# Patient Record
Sex: MALE | Race: OTHER | HISPANIC OR LATINO
[De-identification: names, ages, dates, MRNs, and addresses within clinical notes are randomized per-mention and may not be internally consistent; named-entity substitution may affect disease eponyms.]

---

## 2016-06-01 RX ORDER — ASPIRIN/CALCIUM CARB/MAGNESIUM 324 MG
1 TABLET ORAL
Qty: 0 | Refills: 0 | DISCHARGE
Start: 2016-06-01

## 2016-06-08 RX ORDER — INSULIN LISPRO 100/ML
10 VIAL (ML) SUBCUTANEOUS
Qty: 0 | Refills: 0 | DISCHARGE
Start: 2016-06-08

## 2016-06-08 RX ORDER — INSULIN LISPRO 100/ML
6 VIAL (ML) SUBCUTANEOUS
Qty: 30 | Refills: 0 | DISCHARGE
Start: 2016-06-08

## 2017-01-18 ENCOUNTER — APPOINTMENT (OUTPATIENT)
Dept: UROLOGY | Facility: CLINIC | Age: 67
End: 2017-01-18

## 2017-01-18 VITALS — OXYGEN SATURATION: 96 % | SYSTOLIC BLOOD PRESSURE: 141 MMHG | HEART RATE: 80 BPM | DIASTOLIC BLOOD PRESSURE: 80 MMHG

## 2017-01-30 ENCOUNTER — APPOINTMENT (OUTPATIENT)
Dept: ORTHOPEDIC SURGERY | Facility: CLINIC | Age: 67
End: 2017-01-30

## 2017-02-06 ENCOUNTER — APPOINTMENT (OUTPATIENT)
Dept: UROLOGY | Facility: IMAGING CENTER | Age: 67
End: 2017-02-06

## 2017-02-06 ENCOUNTER — RESULT CHARGE (OUTPATIENT)
Age: 67
End: 2017-02-06

## 2017-02-06 ENCOUNTER — OUTPATIENT (OUTPATIENT)
Dept: OUTPATIENT SERVICES | Facility: HOSPITAL | Age: 67
LOS: 1 days | End: 2017-02-06
Payer: MEDICAID

## 2017-02-06 VITALS
TEMPERATURE: 98.7 F | SYSTOLIC BLOOD PRESSURE: 152 MMHG | HEART RATE: 83 BPM | DIASTOLIC BLOOD PRESSURE: 99 MMHG | RESPIRATION RATE: 18 BRPM

## 2017-02-06 DIAGNOSIS — Z95.1 PRESENCE OF AORTOCORONARY BYPASS GRAFT: Chronic | ICD-10-CM

## 2017-02-06 DIAGNOSIS — R35.0 FREQUENCY OF MICTURITION: ICD-10-CM

## 2017-02-06 LAB
BILIRUB UR QL STRIP: NEGATIVE
CLARITY UR: CLEAR
COLLECTION METHOD: NORMAL
GLUCOSE UR-MCNC: NORMAL
HCG UR QL: 0.2 EU/DL
HGB UR QL STRIP.AUTO: NEGATIVE
KETONES UR-MCNC: NEGATIVE
LEUKOCYTE ESTERASE UR QL STRIP: NEGATIVE
NITRITE UR QL STRIP: NEGATIVE
PH UR STRIP: 6
PROT UR STRIP-MCNC: NEGATIVE
SP GR UR STRIP: 1.01

## 2017-02-06 PROCEDURE — 51784 ANAL/URINARY MUSCLE STUDY: CPT

## 2017-02-06 PROCEDURE — 51728 CYSTOMETROGRAM W/VP: CPT

## 2017-02-06 PROCEDURE — 51797 INTRAABDOMINAL PRESSURE TEST: CPT

## 2017-02-06 PROCEDURE — 51741 ELECTRO-UROFLOWMETRY FIRST: CPT

## 2017-02-08 ENCOUNTER — APPOINTMENT (OUTPATIENT)
Dept: UROLOGY | Facility: CLINIC | Age: 67
End: 2017-02-08

## 2017-02-08 VITALS — DIASTOLIC BLOOD PRESSURE: 80 MMHG | OXYGEN SATURATION: 96 % | SYSTOLIC BLOOD PRESSURE: 115 MMHG | HEART RATE: 74 BPM

## 2017-02-13 ENCOUNTER — APPOINTMENT (OUTPATIENT)
Dept: ORTHOPEDIC SURGERY | Facility: CLINIC | Age: 67
End: 2017-02-13

## 2017-02-13 RX ORDER — COLD-HOT PACK
125 MCG EACH MISCELLANEOUS
Qty: 30 | Refills: 0 | Status: ACTIVE | COMMUNITY
Start: 2016-12-01

## 2017-02-13 RX ORDER — SENNOSIDES 8.6 MG/1
8.6 TABLET ORAL
Qty: 30 | Refills: 0 | Status: ACTIVE | COMMUNITY
Start: 2016-12-01

## 2017-02-13 RX ORDER — DOCUSATE SODIUM 100 MG/1
100 CAPSULE ORAL
Qty: 90 | Refills: 0 | Status: ACTIVE | COMMUNITY
Start: 2016-12-01

## 2017-02-15 ENCOUNTER — FORM ENCOUNTER (OUTPATIENT)
Age: 67
End: 2017-02-15

## 2017-02-16 ENCOUNTER — APPOINTMENT (OUTPATIENT)
Dept: UROLOGY | Facility: CLINIC | Age: 67
End: 2017-02-16

## 2017-02-16 ENCOUNTER — OUTPATIENT (OUTPATIENT)
Dept: OUTPATIENT SERVICES | Facility: HOSPITAL | Age: 67
LOS: 1 days | End: 2017-02-16
Payer: MEDICARE

## 2017-02-16 ENCOUNTER — APPOINTMENT (OUTPATIENT)
Dept: ULTRASOUND IMAGING | Facility: IMAGING CENTER | Age: 67
End: 2017-02-16

## 2017-02-16 VITALS
SYSTOLIC BLOOD PRESSURE: 123 MMHG | BODY MASS INDEX: 23.9 KG/M2 | DIASTOLIC BLOOD PRESSURE: 75 MMHG | WEIGHT: 140 LBS | RESPIRATION RATE: 17 BRPM | HEART RATE: 76 BPM | HEIGHT: 64 IN | TEMPERATURE: 98.7 F

## 2017-02-16 DIAGNOSIS — Z95.1 PRESENCE OF AORTOCORONARY BYPASS GRAFT: Chronic | ICD-10-CM

## 2017-02-16 DIAGNOSIS — R33.9 RETENTION OF URINE, UNSPECIFIED: ICD-10-CM

## 2017-02-16 PROCEDURE — 76770 US EXAM ABDO BACK WALL COMP: CPT

## 2017-02-17 ENCOUNTER — APPOINTMENT (OUTPATIENT)
Dept: UROLOGY | Facility: CLINIC | Age: 67
End: 2017-02-17

## 2017-02-17 VITALS — HEART RATE: 100 BPM | DIASTOLIC BLOOD PRESSURE: 74 MMHG | SYSTOLIC BLOOD PRESSURE: 120 MMHG | OXYGEN SATURATION: 95 %

## 2017-02-18 DIAGNOSIS — R32 UNSPECIFIED URINARY INCONTINENCE: ICD-10-CM

## 2017-03-06 ENCOUNTER — MESSAGE (OUTPATIENT)
Age: 67
End: 2017-03-06

## 2017-03-06 ENCOUNTER — OUTPATIENT (OUTPATIENT)
Dept: OUTPATIENT SERVICES | Facility: HOSPITAL | Age: 67
LOS: 1 days | End: 2017-03-06
Payer: MEDICARE

## 2017-03-06 ENCOUNTER — APPOINTMENT (OUTPATIENT)
Dept: UROLOGY | Facility: CLINIC | Age: 67
End: 2017-03-06

## 2017-03-06 VITALS
SYSTOLIC BLOOD PRESSURE: 149 MMHG | TEMPERATURE: 98.2 F | DIASTOLIC BLOOD PRESSURE: 81 MMHG | HEART RATE: 80 BPM | RESPIRATION RATE: 17 BRPM

## 2017-03-06 DIAGNOSIS — Z95.1 PRESENCE OF AORTOCORONARY BYPASS GRAFT: Chronic | ICD-10-CM

## 2017-03-06 PROCEDURE — 51700 IRRIGATION OF BLADDER: CPT

## 2017-03-07 ENCOUNTER — APPOINTMENT (OUTPATIENT)
Dept: UROLOGY | Facility: CLINIC | Age: 67
End: 2017-03-07

## 2017-03-09 ENCOUNTER — MESSAGE (OUTPATIENT)
Age: 67
End: 2017-03-09

## 2017-03-09 LAB — BACTERIA UR CULT: ABNORMAL

## 2017-03-16 DIAGNOSIS — N13.9 OBSTRUCTIVE AND REFLUX UROPATHY, UNSPECIFIED: ICD-10-CM

## 2017-03-16 DIAGNOSIS — N39.44 NOCTURNAL ENURESIS: ICD-10-CM

## 2017-03-16 DIAGNOSIS — R33.9 RETENTION OF URINE, UNSPECIFIED: ICD-10-CM

## 2017-03-20 ENCOUNTER — APPOINTMENT (OUTPATIENT)
Dept: UROLOGY | Facility: CLINIC | Age: 67
End: 2017-03-20

## 2017-03-20 RX ORDER — CIPROFLOXACIN HYDROCHLORIDE 500 MG/1
500 TABLET, FILM COATED ORAL TWICE DAILY
Qty: 6 | Refills: 0 | Status: COMPLETED | COMMUNITY
Start: 2017-03-06 | End: 2017-03-20

## 2017-03-24 LAB
ANION GAP SERPL CALC-SCNC: 14 MMOL/L
BUN SERPL-MCNC: 19 MG/DL
CALCIUM SERPL-MCNC: 10.2 MG/DL
CHLORIDE SERPL-SCNC: 105 MMOL/L
CO2 SERPL-SCNC: 21 MMOL/L
CREAT SERPL-MCNC: 1.35 MG/DL
GLUCOSE SERPL-MCNC: 98 MG/DL
POTASSIUM SERPL-SCNC: 4.3 MMOL/L
SODIUM SERPL-SCNC: 140 MMOL/L

## 2017-03-29 ENCOUNTER — APPOINTMENT (OUTPATIENT)
Dept: UROLOGY | Facility: CLINIC | Age: 67
End: 2017-03-29

## 2017-03-29 ENCOUNTER — OUTPATIENT (OUTPATIENT)
Dept: OUTPATIENT SERVICES | Facility: HOSPITAL | Age: 67
LOS: 1 days | End: 2017-03-29
Payer: MEDICARE

## 2017-03-29 DIAGNOSIS — Z95.1 PRESENCE OF AORTOCORONARY BYPASS GRAFT: Chronic | ICD-10-CM

## 2017-03-29 PROCEDURE — 51701 INSERT BLADDER CATHETER: CPT

## 2017-03-31 ENCOUNTER — APPOINTMENT (OUTPATIENT)
Dept: UROLOGY | Facility: CLINIC | Age: 67
End: 2017-03-31

## 2017-03-31 VITALS — SYSTOLIC BLOOD PRESSURE: 90 MMHG | HEART RATE: 93 BPM | DIASTOLIC BLOOD PRESSURE: 61 MMHG | TEMPERATURE: 98.1 F

## 2017-03-31 LAB
ANION GAP SERPL CALC-SCNC: 20 MMOL/L
APPEARANCE: CLEAR
BACTERIA: ABNORMAL
BILIRUBIN URINE: NEGATIVE
BLOOD URINE: NEGATIVE
BUN SERPL-MCNC: 21 MG/DL
CALCIUM SERPL-MCNC: 9.4 MG/DL
CHLORIDE SERPL-SCNC: 106 MMOL/L
CO2 SERPL-SCNC: 18 MMOL/L
COLOR: YELLOW
CREAT SERPL-MCNC: 1.42 MG/DL
GLUCOSE QUALITATIVE U: NORMAL MG/DL
GLUCOSE SERPL-MCNC: 79 MG/DL
KETONES URINE: NEGATIVE
LEUKOCYTE ESTERASE URINE: NEGATIVE
MICROSCOPIC-UA: NORMAL
NITRITE URINE: NEGATIVE
PH URINE: 6
POTASSIUM SERPL-SCNC: 4.2 MMOL/L
PROTEIN URINE: NEGATIVE MG/DL
RED BLOOD CELLS URINE: 2 /HPF
SODIUM SERPL-SCNC: 144 MMOL/L
SPECIFIC GRAVITY URINE: 1.01
SQUAMOUS EPITHELIAL CELLS: 0 /HPF
UROBILINOGEN URINE: NORMAL MG/DL
WHITE BLOOD CELLS URINE: 2 /HPF

## 2017-04-03 ENCOUNTER — MESSAGE (OUTPATIENT)
Age: 67
End: 2017-04-03

## 2017-04-03 LAB
BACTERIA UR CULT: ABNORMAL
CORE LAB FLUID CYTOLOGY: NORMAL
PSA SERPL-MCNC: 6.15 NG/ML

## 2017-04-06 DIAGNOSIS — N39.44 NOCTURNAL ENURESIS: ICD-10-CM

## 2017-04-06 DIAGNOSIS — R35.0 FREQUENCY OF MICTURITION: ICD-10-CM

## 2017-04-06 DIAGNOSIS — R33.9 RETENTION OF URINE, UNSPECIFIED: ICD-10-CM

## 2017-05-10 ENCOUNTER — APPOINTMENT (OUTPATIENT)
Dept: UROLOGY | Facility: CLINIC | Age: 67
End: 2017-05-10

## 2017-05-10 ENCOUNTER — OUTPATIENT (OUTPATIENT)
Dept: OUTPATIENT SERVICES | Facility: HOSPITAL | Age: 67
LOS: 1 days | End: 2017-05-10
Payer: MEDICARE

## 2017-05-10 VITALS — TEMPERATURE: 98.5 F | SYSTOLIC BLOOD PRESSURE: 113 MMHG | DIASTOLIC BLOOD PRESSURE: 72 MMHG

## 2017-05-10 DIAGNOSIS — Z95.1 PRESENCE OF AORTOCORONARY BYPASS GRAFT: Chronic | ICD-10-CM

## 2017-05-10 PROCEDURE — 51702 INSERT TEMP BLADDER CATH: CPT

## 2017-05-10 RX ORDER — VITAMIN K2 90 MCG
125 MCG CAPSULE ORAL
Qty: 30 | Refills: 0 | Status: ACTIVE | COMMUNITY
Start: 2017-02-02

## 2017-05-10 RX ORDER — AMOXICILLIN AND CLAVULANATE POTASSIUM 875; 125 MG/1; MG/1
875-125 TABLET, COATED ORAL
Qty: 14 | Refills: 0 | Status: COMPLETED | COMMUNITY
Start: 2017-04-03 | End: 2017-05-10

## 2017-05-12 LAB — BACTERIA UR CULT: ABNORMAL

## 2017-05-15 DIAGNOSIS — R97.20 ELEVATED PROSTATE SPECIFIC ANTIGEN [PSA]: ICD-10-CM

## 2017-05-15 DIAGNOSIS — R33.9 RETENTION OF URINE, UNSPECIFIED: ICD-10-CM

## 2017-05-15 DIAGNOSIS — N40.1 BENIGN PROSTATIC HYPERPLASIA WITH LOWER URINARY TRACT SYMPTOMS: ICD-10-CM

## 2017-06-07 ENCOUNTER — APPOINTMENT (OUTPATIENT)
Dept: UROLOGY | Facility: CLINIC | Age: 67
End: 2017-06-07

## 2017-06-07 ENCOUNTER — OUTPATIENT (OUTPATIENT)
Dept: OUTPATIENT SERVICES | Facility: HOSPITAL | Age: 67
LOS: 1 days | End: 2017-06-07
Payer: MEDICARE

## 2017-06-07 VITALS — HEART RATE: 108 BPM | DIASTOLIC BLOOD PRESSURE: 61 MMHG | SYSTOLIC BLOOD PRESSURE: 95 MMHG

## 2017-06-07 DIAGNOSIS — R35.0 FREQUENCY OF MICTURITION: ICD-10-CM

## 2017-06-07 DIAGNOSIS — Z95.1 PRESENCE OF AORTOCORONARY BYPASS GRAFT: Chronic | ICD-10-CM

## 2017-06-07 PROCEDURE — 51700 IRRIGATION OF BLADDER: CPT

## 2017-06-08 ENCOUNTER — APPOINTMENT (OUTPATIENT)
Dept: UROLOGY | Facility: CLINIC | Age: 67
End: 2017-06-08

## 2017-06-09 DIAGNOSIS — N31.9 NEUROMUSCULAR DYSFUNCTION OF BLADDER, UNSPECIFIED: ICD-10-CM

## 2017-06-09 DIAGNOSIS — N40.1 BENIGN PROSTATIC HYPERPLASIA WITH LOWER URINARY TRACT SYMPTOMS: ICD-10-CM

## 2017-06-09 DIAGNOSIS — R33.9 RETENTION OF URINE, UNSPECIFIED: ICD-10-CM

## 2017-06-15 ENCOUNTER — OUTPATIENT (OUTPATIENT)
Dept: OUTPATIENT SERVICES | Facility: HOSPITAL | Age: 67
LOS: 1 days | End: 2017-06-15
Payer: MEDICARE

## 2017-06-15 VITALS
RESPIRATION RATE: 18 BRPM | TEMPERATURE: 99 F | DIASTOLIC BLOOD PRESSURE: 94 MMHG | HEART RATE: 99 BPM | OXYGEN SATURATION: 99 % | HEIGHT: 64 IN | SYSTOLIC BLOOD PRESSURE: 142 MMHG | WEIGHT: 147.05 LBS

## 2017-06-15 DIAGNOSIS — E11.9 TYPE 2 DIABETES MELLITUS WITHOUT COMPLICATIONS: ICD-10-CM

## 2017-06-15 DIAGNOSIS — R33.9 RETENTION OF URINE, UNSPECIFIED: ICD-10-CM

## 2017-06-15 DIAGNOSIS — I25.10 ATHEROSCLEROTIC HEART DISEASE OF NATIVE CORONARY ARTERY WITHOUT ANGINA PECTORIS: ICD-10-CM

## 2017-06-15 DIAGNOSIS — Z01.818 ENCOUNTER FOR OTHER PREPROCEDURAL EXAMINATION: ICD-10-CM

## 2017-06-15 DIAGNOSIS — Z95.1 PRESENCE OF AORTOCORONARY BYPASS GRAFT: Chronic | ICD-10-CM

## 2017-06-15 DIAGNOSIS — Z98.890 OTHER SPECIFIED POSTPROCEDURAL STATES: Chronic | ICD-10-CM

## 2017-06-15 DIAGNOSIS — N40.0 BENIGN PROSTATIC HYPERPLASIA WITHOUT LOWER URINARY TRACT SYMPTOMS: ICD-10-CM

## 2017-06-15 LAB
BLD GP AB SCN SERPL QL: NEGATIVE — SIGNIFICANT CHANGE UP
RH IG SCN BLD-IMP: POSITIVE — SIGNIFICANT CHANGE UP

## 2017-06-15 PROCEDURE — G0463: CPT

## 2017-06-15 PROCEDURE — 86901 BLOOD TYPING SEROLOGIC RH(D): CPT

## 2017-06-15 PROCEDURE — 86900 BLOOD TYPING SEROLOGIC ABO: CPT

## 2017-06-15 PROCEDURE — 86850 RBC ANTIBODY SCREEN: CPT

## 2017-06-15 PROCEDURE — 87086 URINE CULTURE/COLONY COUNT: CPT

## 2017-06-15 PROCEDURE — 87186 SC STD MICRODIL/AGAR DIL: CPT

## 2017-06-15 RX ORDER — CEFAZOLIN SODIUM 1 G
2000 VIAL (EA) INJECTION ONCE
Qty: 0 | Refills: 0 | Status: DISCONTINUED | OUTPATIENT
Start: 2017-06-28 | End: 2017-07-13

## 2017-06-15 NOTE — H&P PST ADULT - PMH
Benign prostatic hypertrophy    Coronary artery disease involving native coronary artery of native heart without angina pectoris    Dyslipidemia    Hypertension    Transient cerebral ischemia, unspecified type  10/2015  Type 2 diabetes mellitus Benign prostatic hypertrophy    Coronary artery disease involving native coronary artery of native heart without angina pectoris    Dyslipidemia    Hypertension    LBBB (left bundle branch block)    Lumbar disc disease    Transient cerebral ischemia, unspecified type  10/2015  Type 2 diabetes mellitus

## 2017-06-15 NOTE — H&P PST ADULT - NSANTHOSAYNRD_GEN_A_CORE
No. RAJENDRA screening performed.  STOP BANG Legend: 0-2 = LOW Risk; 3-4 = INTERMEDIATE Risk; 5-8 = HIGH Risk

## 2017-06-15 NOTE — H&P PST ADULT - HISTORY OF PRESENT ILLNESS
65 y/o M PMH old LBBB, CAD, S/P CABG 2015, lumbar spinal stenosis, S/P lumbar laminectomy 5/2016,  incomplete bladder emptying, enuresis, valdes catheter placed 2 months ago, citrobacter freundii UTI, treated with antibiotics last week as per patient.  Presents today for button TURP. 67 y/o Telugu speaking M PMH old LBBB, CAD, S/P CABG 2015, chronic stable CHF, lumbar spinal stenosis, S/P lumbar laminectomy 5/2016,  incomplete bladder emptying, enuresis, valdes catheter placed 2 months ago, citrobacter freundii UTI, treated with antibiotics last week as per patient.  Presents today for button TURP.

## 2017-06-16 ENCOUNTER — MESSAGE (OUTPATIENT)
Age: 67
End: 2017-06-16

## 2017-06-16 DIAGNOSIS — Z87.440 PERSONAL HISTORY OF URINARY (TRACT) INFECTIONS: ICD-10-CM

## 2017-06-16 LAB — BACTERIA UR CULT: ABNORMAL

## 2017-06-17 LAB
-  AMIKACIN: SIGNIFICANT CHANGE UP
-  AMPICILLIN/SULBACTAM: SIGNIFICANT CHANGE UP
-  AMPICILLIN: SIGNIFICANT CHANGE UP
-  AZTREONAM: SIGNIFICANT CHANGE UP
-  CEFAZOLIN: SIGNIFICANT CHANGE UP
-  CEFEPIME: SIGNIFICANT CHANGE UP
-  CEFOTAXIME: SIGNIFICANT CHANGE UP
-  CEFOXITIN: SIGNIFICANT CHANGE UP
-  CEFTAZIDIME: SIGNIFICANT CHANGE UP
-  CEFTRIAXONE: SIGNIFICANT CHANGE UP
-  CEFUROXIME: SIGNIFICANT CHANGE UP
-  CIPROFLOXACIN: SIGNIFICANT CHANGE UP
-  ERTAPENEM: SIGNIFICANT CHANGE UP
-  GENTAMICIN: SIGNIFICANT CHANGE UP
-  IMIPENEM: SIGNIFICANT CHANGE UP
-  LEVOFLOXACIN: SIGNIFICANT CHANGE UP
-  MEROPENEM: SIGNIFICANT CHANGE UP
-  NITROFURANTOIN: SIGNIFICANT CHANGE UP
-  PIPERACILLIN/TAZOBACTAM: SIGNIFICANT CHANGE UP
-  TIGECYCLINE: SIGNIFICANT CHANGE UP
-  TOBRAMYCIN: SIGNIFICANT CHANGE UP
-  TRIMETHOPRIM/SULFAMETHOXAZOLE: SIGNIFICANT CHANGE UP
CULTURE RESULTS: SIGNIFICANT CHANGE UP
METHOD TYPE: SIGNIFICANT CHANGE UP
ORGANISM # SPEC MICROSCOPIC CNT: SIGNIFICANT CHANGE UP
ORGANISM # SPEC MICROSCOPIC CNT: SIGNIFICANT CHANGE UP
SPECIMEN SOURCE: SIGNIFICANT CHANGE UP

## 2017-06-22 ENCOUNTER — CLINICAL ADVICE (OUTPATIENT)
Age: 67
End: 2017-06-22

## 2017-06-22 LAB — BACTERIA UR CULT: ABNORMAL

## 2017-06-28 ENCOUNTER — RESULT REVIEW (OUTPATIENT)
Age: 67
End: 2017-06-28

## 2017-06-28 ENCOUNTER — TRANSCRIPTION ENCOUNTER (OUTPATIENT)
Age: 67
End: 2017-06-28

## 2017-06-28 ENCOUNTER — OUTPATIENT (OUTPATIENT)
Dept: OUTPATIENT SERVICES | Facility: HOSPITAL | Age: 67
LOS: 1 days | End: 2017-06-28
Payer: MEDICARE

## 2017-06-28 ENCOUNTER — APPOINTMENT (OUTPATIENT)
Dept: UROLOGY | Facility: HOSPITAL | Age: 67
End: 2017-06-28

## 2017-06-28 VITALS
HEART RATE: 83 BPM | HEIGHT: 64 IN | DIASTOLIC BLOOD PRESSURE: 92 MMHG | SYSTOLIC BLOOD PRESSURE: 151 MMHG | OXYGEN SATURATION: 98 % | TEMPERATURE: 98 F | WEIGHT: 143.08 LBS | RESPIRATION RATE: 20 BRPM

## 2017-06-28 VITALS
DIASTOLIC BLOOD PRESSURE: 92 MMHG | SYSTOLIC BLOOD PRESSURE: 145 MMHG | TEMPERATURE: 98 F | HEART RATE: 76 BPM | RESPIRATION RATE: 16 BRPM | OXYGEN SATURATION: 99 %

## 2017-06-28 DIAGNOSIS — Z98.890 OTHER SPECIFIED POSTPROCEDURAL STATES: Chronic | ICD-10-CM

## 2017-06-28 DIAGNOSIS — Z95.1 PRESENCE OF AORTOCORONARY BYPASS GRAFT: Chronic | ICD-10-CM

## 2017-06-28 DIAGNOSIS — R33.9 RETENTION OF URINE, UNSPECIFIED: ICD-10-CM

## 2017-06-28 PROCEDURE — 52601 PROSTATECTOMY (TURP): CPT

## 2017-06-28 PROCEDURE — 88305 TISSUE EXAM BY PATHOLOGIST: CPT

## 2017-06-28 PROCEDURE — 88305 TISSUE EXAM BY PATHOLOGIST: CPT | Mod: 26

## 2017-06-28 RX ORDER — MOXIFLOXACIN HYDROCHLORIDE TABLETS, 400 MG 400 MG/1
1 TABLET, FILM COATED ORAL
Qty: 20 | Refills: 0
Start: 2017-06-28 | End: 2017-07-08

## 2017-06-28 RX ORDER — SODIUM CHLORIDE 9 MG/ML
3 INJECTION INTRAMUSCULAR; INTRAVENOUS; SUBCUTANEOUS EVERY 8 HOURS
Qty: 0 | Refills: 0 | Status: DISCONTINUED | OUTPATIENT
Start: 2017-06-28 | End: 2017-06-28

## 2017-06-28 RX ORDER — DOCUSATE SODIUM 100 MG
1 CAPSULE ORAL
Qty: 60 | Refills: 0
Start: 2017-06-28 | End: 2017-07-28

## 2017-06-28 RX ORDER — FINASTERIDE 5 MG/1
1 TABLET, FILM COATED ORAL
Qty: 0 | Refills: 0 | COMMUNITY

## 2017-06-28 RX ORDER — ASPIRIN/CALCIUM CARB/MAGNESIUM 324 MG
81 TABLET ORAL ONCE
Qty: 0 | Refills: 0 | Status: COMPLETED | OUTPATIENT
Start: 2017-06-28 | End: 2017-06-28

## 2017-06-28 RX ORDER — ONDANSETRON 8 MG/1
4 TABLET, FILM COATED ORAL EVERY 8 HOURS
Qty: 0 | Refills: 0 | Status: DISCONTINUED | OUTPATIENT
Start: 2017-06-28 | End: 2017-06-28

## 2017-06-28 RX ORDER — SODIUM CHLORIDE 9 MG/ML
1000 INJECTION, SOLUTION INTRAVENOUS
Qty: 0 | Refills: 0 | Status: DISCONTINUED | OUTPATIENT
Start: 2017-06-28 | End: 2017-07-13

## 2017-06-28 RX ORDER — HYDROMORPHONE HYDROCHLORIDE 2 MG/ML
0.5 INJECTION INTRAMUSCULAR; INTRAVENOUS; SUBCUTANEOUS
Qty: 0 | Refills: 0 | Status: DISCONTINUED | OUTPATIENT
Start: 2017-06-28 | End: 2017-06-28

## 2017-06-28 RX ORDER — SENNA PLUS 8.6 MG/1
2 TABLET ORAL
Qty: 42 | Refills: 0
Start: 2017-06-28 | End: 2017-07-19

## 2017-06-28 RX ORDER — ACETAMINOPHEN WITH CODEINE 300MG-30MG
1 TABLET ORAL
Qty: 8 | Refills: 0
Start: 2017-06-28 | End: 2017-06-30

## 2017-06-28 RX ADMIN — SODIUM CHLORIDE 3 MILLILITER(S): 9 INJECTION INTRAMUSCULAR; INTRAVENOUS; SUBCUTANEOUS at 07:13

## 2017-06-28 RX ADMIN — Medication 81 MILLIGRAM(S): at 07:24

## 2017-06-28 RX ADMIN — SODIUM CHLORIDE 150 MILLILITER(S): 9 INJECTION, SOLUTION INTRAVENOUS at 09:14

## 2017-06-28 NOTE — ASU DISCHARGE PLAN (ADULT/PEDIATRIC). - MEDICATION SUMMARY - MEDICATIONS TO STOP TAKING
I will STOP taking the medications listed below when I get home from the hospital:    sulfamethoxazole-trimethoprim 800 mg-160 mg oral tablet  -- 1 tab(s) by mouth once a day

## 2017-06-28 NOTE — ASU DISCHARGE PLAN (ADULT/PEDIATRIC). - MEDICATION SUMMARY - MEDICATIONS TO TAKE
I will START or STAY ON the medications listed below when I get home from the hospital:    spironolactone 25 mg oral tablet  -- 1  by mouth once a day  -- Indication: For home medication    acetaminophen-codeine 300 mg-15 mg oral tablet  -- 1 tab(s) by mouth every 6 hours MDD:4  -- Caution federal law prohibits the transfer of this drug to any person other  than the person for whom it was prescribed.  May cause drowsiness.  Alcohol may intensify this effect.  Use care when operating dangerous machinery.  This product contains acetaminophen.  Do not use  with any other product containing acetaminophen to prevent possible liver damage.  Using more of this medication than prescribed may cause serious breathing problems.    -- Indication: For pain    aspirin 81 mg oral delayed release tablet  -- 1 tab(s) by mouth once a day  -- Indication: For home medication    oxycodone-acetaminophen 5 mg-300 mg oral tablet  -- 2 tab(s) by mouth , As Needed  -- Indication: For pain    tamsulosin 0.4 mg oral capsule  -- 1 cap(s) by mouth once a day  -- Indication: For home medication    gabapentin 300 mg oral capsule  -- 1 cap(s) by mouth 3 times a day  -- Indication: For home medication    Lyrica 50 mg oral capsule  -- 1 tab(s) by mouth once a day  -- Indication: For home medication    insulin glargine  -- 14 unit(s) subcutaneous once a day MDD:once a day in the morning  -- Indication: For home medication    HumaLOG 100 units/mL subcutaneous solution  -- 6 unit(s) subcutaneous 3 times a day (before meals)  -- Indication: For home medication    insulin glargine 100 units/mL subcutaneous solution  -- 40 unit(s) subcutaneous once a day (at bedtime)  -- Indication: For home medication    Lipitor 10 mg oral tablet  -- 1 tab(s) by mouth once a day (at bedtime)  -- Indication: For home medication    zolpidem 5 mg oral tablet  -- 1 tab(s) by mouth once a day (at bedtime)  -- Indication: For home medication    metoprolol succinate 50 mg oral tablet, extended release  -- 1 tab(s) by mouth once a day  -- Indication: For home medication    furosemide 20 mg oral tablet  -- 1 tab(s) by mouth 2 times a day  -- Indication: For home medication    Senna Lax 15 mg oral tablet  -- 2 tab(s) by mouth once a day (at bedtime)  -- Indication: For constipation    Colace sodium 100 mg oral capsule  -- 1 cap(s) by mouth 2 times a day  -- Medication should be taken with plenty of water.    -- Indication: For constipation    Cipro 500 mg oral tablet  -- 1 tab(s) by mouth every 12 hours  -- Avoid prolonged or excessive exposure to direct and/or artificial sunlight while taking this medication.  Check with your doctor before becoming pregnant.  Do not take dairy products, antacids, or iron preparations within one hour of this medication.  Finish all this medication unless otherwise directed by prescriber.  Medication should be taken with plenty of water.    -- Indication: For antibiotic    Vitamin D3 5000 intl units oral capsule  -- 1 cap(s) by mouth once a day  -- Indication: For home medication

## 2017-06-30 ENCOUNTER — APPOINTMENT (OUTPATIENT)
Dept: UROLOGY | Facility: CLINIC | Age: 67
End: 2017-06-30

## 2017-07-05 LAB — SURGICAL PATHOLOGY STUDY: SIGNIFICANT CHANGE UP

## 2017-07-07 ENCOUNTER — APPOINTMENT (OUTPATIENT)
Dept: UROLOGY | Facility: CLINIC | Age: 67
End: 2017-07-07

## 2017-07-12 ENCOUNTER — FORM ENCOUNTER (OUTPATIENT)
Age: 67
End: 2017-07-12

## 2017-07-13 ENCOUNTER — OUTPATIENT (OUTPATIENT)
Dept: OUTPATIENT SERVICES | Facility: HOSPITAL | Age: 67
LOS: 1 days | End: 2017-07-13
Payer: MEDICARE

## 2017-07-13 ENCOUNTER — APPOINTMENT (OUTPATIENT)
Dept: UROLOGY | Facility: CLINIC | Age: 67
End: 2017-07-13

## 2017-07-13 ENCOUNTER — APPOINTMENT (OUTPATIENT)
Dept: ULTRASOUND IMAGING | Facility: IMAGING CENTER | Age: 67
End: 2017-07-13

## 2017-07-13 DIAGNOSIS — Z98.890 OTHER SPECIFIED POSTPROCEDURAL STATES: Chronic | ICD-10-CM

## 2017-07-13 DIAGNOSIS — Z95.1 PRESENCE OF AORTOCORONARY BYPASS GRAFT: Chronic | ICD-10-CM

## 2017-07-13 DIAGNOSIS — N40.1 BENIGN PROSTATIC HYPERPLASIA WITH LOWER URINARY TRACT SYMPTOMS: ICD-10-CM

## 2017-07-13 DIAGNOSIS — N39.44 NOCTURNAL ENURESIS: ICD-10-CM

## 2017-07-13 PROCEDURE — 76770 US EXAM ABDO BACK WALL COMP: CPT

## 2017-07-14 LAB
ANION GAP SERPL CALC-SCNC: 17 MMOL/L
BUN SERPL-MCNC: 19 MG/DL
CALCIUM SERPL-MCNC: 9.3 MG/DL
CHLORIDE SERPL-SCNC: 107 MMOL/L
CO2 SERPL-SCNC: 19 MMOL/L
CREAT SERPL-MCNC: 1.66 MG/DL
GLUCOSE SERPL-MCNC: 205 MG/DL
POTASSIUM SERPL-SCNC: 4.9 MMOL/L
SODIUM SERPL-SCNC: 143 MMOL/L

## 2017-08-28 ENCOUNTER — APPOINTMENT (OUTPATIENT)
Dept: ORTHOPEDIC SURGERY | Facility: CLINIC | Age: 67
End: 2017-08-28
Payer: MEDICARE

## 2017-08-28 PROCEDURE — 99214 OFFICE O/P EST MOD 30 MIN: CPT

## 2017-08-28 PROCEDURE — 72080 X-RAY EXAM THORACOLMB 2/> VW: CPT

## 2017-09-25 ENCOUNTER — APPOINTMENT (OUTPATIENT)
Dept: UROLOGY | Facility: CLINIC | Age: 67
End: 2017-09-25
Payer: MEDICARE

## 2017-09-25 PROCEDURE — 99024 POSTOP FOLLOW-UP VISIT: CPT

## 2017-09-25 PROCEDURE — 51798 US URINE CAPACITY MEASURE: CPT

## 2017-09-25 RX ORDER — SULFAMETHOXAZOLE AND TRIMETHOPRIM 800; 160 MG/1; MG/1
800-160 TABLET ORAL
Qty: 6 | Refills: 0 | Status: COMPLETED | COMMUNITY
Start: 2017-06-16 | End: 2017-09-25

## 2017-09-25 RX ORDER — TAMSULOSIN HYDROCHLORIDE 0.4 MG/1
0.4 CAPSULE ORAL
Qty: 180 | Refills: 3 | Status: ACTIVE | COMMUNITY
Start: 2017-02-16 | End: 1900-01-01

## 2017-09-25 RX ORDER — FINASTERIDE 5 MG/1
5 TABLET, FILM COATED ORAL DAILY
Qty: 90 | Refills: 3 | Status: ACTIVE | COMMUNITY
Start: 2017-02-16 | End: 1900-01-01

## 2017-09-26 ENCOUNTER — APPOINTMENT (OUTPATIENT)
Dept: PHYSICAL MEDICINE AND REHAB | Facility: CLINIC | Age: 67
End: 2017-09-26
Payer: MEDICARE

## 2017-09-26 VITALS
SYSTOLIC BLOOD PRESSURE: 112 MMHG | DIASTOLIC BLOOD PRESSURE: 70 MMHG | TEMPERATURE: 97.7 F | BODY MASS INDEX: 25.61 KG/M2 | HEART RATE: 71 BPM | HEIGHT: 64 IN | WEIGHT: 150 LBS | OXYGEN SATURATION: 100 %

## 2017-09-26 PROCEDURE — 99204 OFFICE O/P NEW MOD 45 MIN: CPT

## 2017-09-27 ENCOUNTER — FORM ENCOUNTER (OUTPATIENT)
Age: 67
End: 2017-09-27

## 2017-09-27 ENCOUNTER — TRANSCRIPTION ENCOUNTER (OUTPATIENT)
Age: 67
End: 2017-09-27

## 2017-09-28 ENCOUNTER — OUTPATIENT (OUTPATIENT)
Dept: OUTPATIENT SERVICES | Facility: HOSPITAL | Age: 67
LOS: 1 days | End: 2017-09-28
Payer: MEDICARE

## 2017-09-28 ENCOUNTER — APPOINTMENT (OUTPATIENT)
Dept: MRI IMAGING | Facility: CLINIC | Age: 67
End: 2017-09-28
Payer: MEDICARE

## 2017-09-28 DIAGNOSIS — Z98.890 OTHER SPECIFIED POSTPROCEDURAL STATES: Chronic | ICD-10-CM

## 2017-09-28 DIAGNOSIS — Z00.8 ENCOUNTER FOR OTHER GENERAL EXAMINATION: ICD-10-CM

## 2017-09-28 DIAGNOSIS — Z95.1 PRESENCE OF AORTOCORONARY BYPASS GRAFT: Chronic | ICD-10-CM

## 2017-09-28 PROCEDURE — 72146 MRI CHEST SPINE W/O DYE: CPT

## 2017-09-28 PROCEDURE — 72146 MRI CHEST SPINE W/O DYE: CPT | Mod: 26

## 2017-11-03 ENCOUNTER — APPOINTMENT (OUTPATIENT)
Dept: NEUROLOGY | Facility: CLINIC | Age: 67
End: 2017-11-03

## 2017-12-21 ENCOUNTER — APPOINTMENT (OUTPATIENT)
Dept: ULTRASOUND IMAGING | Facility: IMAGING CENTER | Age: 67
End: 2017-12-21

## 2017-12-21 ENCOUNTER — APPOINTMENT (OUTPATIENT)
Dept: UROLOGY | Facility: CLINIC | Age: 67
End: 2017-12-21
Payer: MEDICARE

## 2017-12-21 PROCEDURE — 99214 OFFICE O/P EST MOD 30 MIN: CPT

## 2017-12-21 PROCEDURE — 51798 US URINE CAPACITY MEASURE: CPT

## 2017-12-22 LAB
ANION GAP SERPL CALC-SCNC: 14 MMOL/L
BUN SERPL-MCNC: 37 MG/DL
CALCIUM SERPL-MCNC: 9.1 MG/DL
CHLORIDE SERPL-SCNC: 100 MMOL/L
CO2 SERPL-SCNC: 24 MMOL/L
CREAT SERPL-MCNC: 1.36 MG/DL
GLUCOSE SERPL-MCNC: 243 MG/DL
POTASSIUM SERPL-SCNC: 5.1 MMOL/L
SODIUM SERPL-SCNC: 138 MMOL/L

## 2018-01-16 LAB — BACTERIA UR CULT: NORMAL

## 2018-02-01 ENCOUNTER — APPOINTMENT (OUTPATIENT)
Dept: UROLOGY | Facility: CLINIC | Age: 68
End: 2018-02-01
Payer: MEDICARE

## 2018-02-01 ENCOUNTER — OUTPATIENT (OUTPATIENT)
Dept: OUTPATIENT SERVICES | Facility: HOSPITAL | Age: 68
LOS: 1 days | End: 2018-02-01
Payer: MEDICARE

## 2018-02-01 VITALS
SYSTOLIC BLOOD PRESSURE: 148 MMHG | HEART RATE: 81 BPM | DIASTOLIC BLOOD PRESSURE: 89 MMHG | TEMPERATURE: 98.2 F | RESPIRATION RATE: 16 BRPM

## 2018-02-01 DIAGNOSIS — Z95.1 PRESENCE OF AORTOCORONARY BYPASS GRAFT: Chronic | ICD-10-CM

## 2018-02-01 DIAGNOSIS — N39.44 NOCTURNAL ENURESIS: ICD-10-CM

## 2018-02-01 DIAGNOSIS — Z98.890 OTHER SPECIFIED POSTPROCEDURAL STATES: Chronic | ICD-10-CM

## 2018-02-01 DIAGNOSIS — R35.0 FREQUENCY OF MICTURITION: ICD-10-CM

## 2018-02-01 PROCEDURE — 51728 CYSTOMETROGRAM W/VP: CPT

## 2018-02-01 PROCEDURE — 76000 FLUOROSCOPY <1 HR PHYS/QHP: CPT | Mod: 26,59

## 2018-02-01 PROCEDURE — 51728 CYSTOMETROGRAM W/VP: CPT | Mod: 26

## 2018-02-01 PROCEDURE — 51797 INTRAABDOMINAL PRESSURE TEST: CPT | Mod: 26

## 2018-02-01 PROCEDURE — 51741 ELECTRO-UROFLOWMETRY FIRST: CPT | Mod: 26

## 2018-02-01 PROCEDURE — 51741 ELECTRO-UROFLOWMETRY FIRST: CPT

## 2018-02-01 PROCEDURE — 76000 FLUOROSCOPY <1 HR PHYS/QHP: CPT | Mod: 59

## 2018-02-01 PROCEDURE — 51784 ANAL/URINARY MUSCLE STUDY: CPT

## 2018-02-01 PROCEDURE — 52000 CYSTOURETHROSCOPY: CPT

## 2018-02-01 PROCEDURE — 51600 INJECTION FOR BLADDER X-RAY: CPT

## 2018-02-01 PROCEDURE — 51784 ANAL/URINARY MUSCLE STUDY: CPT | Mod: 26

## 2018-02-01 PROCEDURE — 51797 INTRAABDOMINAL PRESSURE TEST: CPT

## 2018-02-06 DIAGNOSIS — R33.9 RETENTION OF URINE, UNSPECIFIED: ICD-10-CM

## 2018-02-06 DIAGNOSIS — N39.41 URGE INCONTINENCE: ICD-10-CM

## 2018-02-06 DIAGNOSIS — N39.44 NOCTURNAL ENURESIS: ICD-10-CM

## 2018-06-26 ENCOUNTER — OTHER (OUTPATIENT)
Age: 68
End: 2018-06-26

## 2018-07-02 ENCOUNTER — APPOINTMENT (OUTPATIENT)
Dept: ORTHOPEDIC SURGERY | Facility: CLINIC | Age: 68
End: 2018-07-02
Payer: MEDICARE

## 2018-07-02 PROCEDURE — 72100 X-RAY EXAM L-S SPINE 2/3 VWS: CPT

## 2018-07-02 PROCEDURE — 99214 OFFICE O/P EST MOD 30 MIN: CPT

## 2018-07-02 RX ORDER — MELOXICAM 15 MG/1
15 TABLET ORAL
Qty: 30 | Refills: 1 | Status: ACTIVE | COMMUNITY
Start: 2018-07-02 | End: 1900-01-01

## 2018-07-02 RX ORDER — CYCLOBENZAPRINE HYDROCHLORIDE 10 MG/1
10 TABLET, FILM COATED ORAL
Qty: 90 | Refills: 1 | Status: ACTIVE | COMMUNITY
Start: 2018-07-02 | End: 1900-01-01

## 2018-07-12 ENCOUNTER — FORM ENCOUNTER (OUTPATIENT)
Age: 68
End: 2018-07-12

## 2018-07-13 ENCOUNTER — OUTPATIENT (OUTPATIENT)
Dept: OUTPATIENT SERVICES | Facility: HOSPITAL | Age: 68
LOS: 1 days | End: 2018-07-13
Payer: MEDICARE

## 2018-07-13 ENCOUNTER — APPOINTMENT (OUTPATIENT)
Dept: CT IMAGING | Facility: CLINIC | Age: 68
End: 2018-07-13
Payer: MEDICARE

## 2018-07-13 ENCOUNTER — APPOINTMENT (OUTPATIENT)
Dept: MRI IMAGING | Facility: CLINIC | Age: 68
End: 2018-07-13
Payer: MEDICARE

## 2018-07-13 DIAGNOSIS — Z00.8 ENCOUNTER FOR OTHER GENERAL EXAMINATION: ICD-10-CM

## 2018-07-13 DIAGNOSIS — Z95.1 PRESENCE OF AORTOCORONARY BYPASS GRAFT: Chronic | ICD-10-CM

## 2018-07-13 DIAGNOSIS — Z98.890 OTHER SPECIFIED POSTPROCEDURAL STATES: Chronic | ICD-10-CM

## 2018-07-13 PROCEDURE — A9585: CPT

## 2018-07-13 PROCEDURE — 72158 MRI LUMBAR SPINE W/O & W/DYE: CPT | Mod: 26

## 2018-07-13 PROCEDURE — 76376 3D RENDER W/INTRP POSTPROCES: CPT

## 2018-07-13 PROCEDURE — 72158 MRI LUMBAR SPINE W/O & W/DYE: CPT

## 2018-07-13 PROCEDURE — 72128 CT CHEST SPINE W/O DYE: CPT

## 2018-07-13 PROCEDURE — 72128 CT CHEST SPINE W/O DYE: CPT | Mod: 26

## 2018-07-13 PROCEDURE — 72131 CT LUMBAR SPINE W/O DYE: CPT

## 2018-07-13 PROCEDURE — 72131 CT LUMBAR SPINE W/O DYE: CPT | Mod: 26

## 2018-07-13 PROCEDURE — 76376 3D RENDER W/INTRP POSTPROCES: CPT | Mod: 26

## 2018-07-13 PROCEDURE — 82565 ASSAY OF CREATININE: CPT

## 2018-07-20 PROBLEM — M51.9 UNSPECIFIED THORACIC, THORACOLUMBAR AND LUMBOSACRAL INTERVERTEBRAL DISC DISORDER: Chronic | Status: ACTIVE | Noted: 2017-06-15

## 2018-08-13 ENCOUNTER — APPOINTMENT (OUTPATIENT)
Dept: ORTHOPEDIC SURGERY | Facility: CLINIC | Age: 68
End: 2018-08-13
Payer: MEDICARE

## 2018-08-13 PROCEDURE — 99214 OFFICE O/P EST MOD 30 MIN: CPT

## 2018-12-18 ENCOUNTER — APPOINTMENT (OUTPATIENT)
Dept: NEUROSURGERY | Facility: CLINIC | Age: 68
End: 2018-12-18

## 2018-12-21 ENCOUNTER — APPOINTMENT (OUTPATIENT)
Dept: NEUROSURGERY | Facility: CLINIC | Age: 68
End: 2018-12-21
Payer: MEDICARE

## 2018-12-21 VITALS
HEIGHT: 64 IN | WEIGHT: 166 LBS | SYSTOLIC BLOOD PRESSURE: 143 MMHG | BODY MASS INDEX: 28.34 KG/M2 | DIASTOLIC BLOOD PRESSURE: 92 MMHG | HEART RATE: 88 BPM

## 2018-12-21 DIAGNOSIS — M96.1 POSTLAMINECTOMY SYNDROME, NOT ELSEWHERE CLASSIFIED: ICD-10-CM

## 2018-12-21 PROCEDURE — 99205 OFFICE O/P NEW HI 60 MIN: CPT | Mod: 57

## 2018-12-21 RX ORDER — GABAPENTIN 300 MG/1
300 CAPSULE ORAL
Qty: 90 | Refills: 0 | Status: ACTIVE | COMMUNITY
Start: 2018-11-29

## 2018-12-21 RX ORDER — TERBINAFINE HYDROCHLORIDE 250 MG/1
250 TABLET ORAL
Qty: 30 | Refills: 0 | Status: ACTIVE | COMMUNITY
Start: 2018-08-27

## 2018-12-21 RX ORDER — FUROSEMIDE 20 MG/1
20 TABLET ORAL
Qty: 30 | Refills: 0 | Status: ACTIVE | COMMUNITY
Start: 2018-11-29

## 2018-12-21 RX ORDER — INSULIN ASPART 100 [IU]/ML
100 INJECTION, SOLUTION INTRAVENOUS; SUBCUTANEOUS
Qty: 10 | Refills: 0 | Status: ACTIVE | COMMUNITY
Start: 2018-08-30

## 2018-12-21 RX ORDER — FENTANYL 25 UG/H
25 PATCH, EXTENDED RELEASE TRANSDERMAL
Qty: 10 | Refills: 0 | Status: ACTIVE | COMMUNITY
Start: 2018-11-29

## 2018-12-21 RX ORDER — PREGABALIN 150 MG/1
150 CAPSULE ORAL
Qty: 60 | Refills: 0 | Status: ACTIVE | COMMUNITY
Start: 2018-12-04

## 2019-03-04 ENCOUNTER — APPOINTMENT (OUTPATIENT)
Dept: ORTHOPEDIC SURGERY | Facility: CLINIC | Age: 69
End: 2019-03-04

## 2019-03-05 ENCOUNTER — APPOINTMENT (OUTPATIENT)
Dept: UROLOGY | Facility: CLINIC | Age: 69
End: 2019-03-05

## 2019-03-25 ENCOUNTER — APPOINTMENT (OUTPATIENT)
Dept: ORTHOPEDIC SURGERY | Facility: CLINIC | Age: 69
End: 2019-03-25
Payer: MEDICARE

## 2019-03-25 PROCEDURE — 99215 OFFICE O/P EST HI 40 MIN: CPT

## 2019-03-25 NOTE — HISTORY OF PRESENT ILLNESS
[Bending] : worsened by bending [Lifting] : worsened by lifting [Prolonged Sitting] : worsened by prolonged sitting [Prolonged Standing] : worsened by prolonged standing [Walking] : worsened by walking [Weight Bearing] : worsened by weight bearing [NSAIDs] : relieved by nonsteroidal anti-inflammatory drugs [Opioid Analgesics] : relieved by opioid analgesics [Recumbency] : relieved by recumbency [de-identified] : Pt s/p T10-L4 Lami and fusion 5/24/16, L2 fracture in kyphosis, loosening of screws, B/L LE radiculopathy, R>L. Presents today for follow. He completed PT without relief. Pain radiates to B/L LE, R>L, associated with numbness on B/L feet, R>L. He is taking Percocet 5/325 mg Q4hrs, [id number] daily, these provides some relief in pain. Pt f/u with Dr Chrystal Rico (pain mgt).He had 3 LESI performed by Dr Bridges without relief, last injection on 01/2019. Pt f/u Dr Rosenberg and saw Dr Dontrell Iverson (neuropsychologist), who recommended repeat MRI lumbar spine. Pt saw Dr Reese who recommended SCS trial, Pt recently hospitalized at [facility] for 15 days due to URI on 03/2019 and at [facility] for 7 days due to UTI on 02/28/19.  Pt is Ambulating without any assistive devices. \par  [Physical Therapy] : not relieved by physical therapy [Incontinence] : no incontinence [Urinary Ret.] : no urinary retention

## 2019-03-25 NOTE — DISCUSSION/SUMMARY
[de-identified] : 65 yo male s/p Laminectomy and Spinal Fusion improving overall condition doing well, progressive neurological claudication symptoms, failed PT, NSAIDS, and APRYL, recommend EMG/NCV BLE with CT myelogram, and then surgical intervention. Next visit will discuss and book surgical intervention.

## 2019-03-25 NOTE — PHYSICAL EXAM
[LE] : Sensory: Intact in bilateral lower extremities [0] : left ankle jerk 0 [ALL] : dorsalis pedis, posterior tibial, femoral, popliteal, and radial 2+ and symmetric bilaterally [Ruff's Sign] : negative Ruff's sign [Pronator Drift] : negative pronator drift [SLR] : negative straight leg raise [Normal] : Oriented to person, place, and time, insight and judgement were intact and the affect was normal [de-identified] : Range of Motion: is limited and is painful. Lumbar. \par TTP L spine and b/l paraspinals L region. \par NTTP T spine. \par Skin intact T/L spine and all 4 extremities. No rashes, ulcers, blisters. \par No lymphedema. \par Posterior T/L incision well healed. \par \par  [de-identified] : TTP over l Spine\par Well healed lumbar spine incision [de-identified] : EXAM: CT 3D RECONSTRUCT ABBE PAIGE \par \par EXAM: CT THORACIC SPINE \par \par EXAM: CT LUMBAR SPINE \par \par \par PROCEDURE DATE: 07/13/2018 \par \par \par \par INTERPRETATION: CT of the thoracic and lumbar spine \par \par CLINICAL INDICATION: Status post thoracolumbar spinal fusion for chronic L2 \par compression fracture. Radiculopathy symptoms. \par \par TECHNIQUE: Axial CT images were obtained of the thoracolumbar spine with \par coronal and sagittal reconstructions. No contrast was administered. Three \par dimensional reconstructions were obtained on an independent workstation. \par Comparison is with the prior lumbar spine MRI dated 4/10/2016 as well as \par prior thoracolumbar spine MRI of the lumbar spine performed concurrently on \par 7/13/2018 and the remote CT of the lumbar spine dated 4/1/2016. \par \par FINDINGS: \par \par In the interval since the prior scans, the patient has undergone \par instrumented posterior spinal fusion from T11 through L4 with bilateral \par pedicle screws and interconnecting rods at all levels except at the L2 level \par where there is evidence of a chronic severe compression fracture deformity \par of the L2 vertebral body. Posterior decompression at L2 and L3 has also been \par performed. There is lucency about bilateral L4 pedicle screws consistent \par with screw loosening. Remaining orthopedic hardware appears intact. \par Morselized bone graft has been placed along the posterior aspect of the \par fusion mass which has yet to fully incorporate. There are probable areas of \par bony bridging, left slightly greater than right from L1 through L3 \par bilaterally. \par \par There is persistent osseous retropulsion of the posterior superior corner \par and posterior aspects of the L2 vertebral body which is unchanged since the \par prior study. There is some interval healing along the inferior margin of the \par fracture site posteriorly but there is a persistent area of chronic nonunion \par between the markedly compressed vertebral body along the anterior aspect of \par the fracture site. There is osseous bridging between the anterior fracture \par fragment of the L2 vertebral body and the anterior aspect of the inferior \par portion of the L1 vertebral body. \par \par No new vertebral body compression fractures are identified. There are no \par osseous lesions. \par \par Mild disc degeneration is noted in the thoracic spine without significant \par spinal canal or foraminal stenosis. Multilevel mild disc degeneration is \par also noted in the lumbar spine. \par \par L1-L2: Osseous retropulsion of the posterior superior and posterior aspect \par of the L2 vertebral body secondary to chronic compression deformity. There \par is been interval posterior decompression since the prior study. There is \par persistent moderate spinal canal stenosis. This is better appreciated on the \par prior MRI scan. There is mild to moderate left and mild right foraminal \par stenosis. \par \par L2-L3: No posterior disc bulge or spinal canal stenosis. There is moderate \par right foraminal stenosis which appears slightly less severe when compared to \par the prior studies. There is mild left foraminal stenosis. \par \par L3-L4: Small disc bulge without spinal canal or foraminal stenosis. \par \par L4-L5: Small disc bulge and ligamentum flavum thickening with mild spinal \par canal stenosis and mild to moderate left foraminal stenosis. There is no \par right foraminal stenosis. \par \par L5-S1: Small disc bulge without spinal canal stenosis. Mild facet \par degeneration. There is moderate left and mild right foraminal stenosis which \par is unchanged since the prior MRI of April 2016 \par \par IMPRESSION: \par \par Interval posterior spinal fusion from T11 to L4 with posterior decompression \par at L2 and L3 as above. \par There is lucency about bilateral L4 pedicle screws indicative of screw \par loosening. \par \par Chronic severe compression fracture deformity of the L2 vertebral body as \par above. \par \par Despite posterior decompression there is moderate spinal canal stenosis at \par the mid and upper L2 levels secondary to osseous retropulsion of the \par chronically fractured L2 vertebral body. There is also moderate right \par foraminal stenosis at L2-L3 but this appears less severe when compared to \par the prior studies. \par \par Moderate left foraminal stenosis at L5-S1 is stable since the prior MRI scan \par of 4/10/2016. \par \par \par LINDSEY ESPITIA M.D., ATTENDING RADIOLOGIST \par This document has been electronically signed. Jul 18 2018 11:51AM \par \par EXAM: MR SPINE LUMBAR WAW IC \par \par \par PROCEDURE DATE: 07/13/2018 \par \par \par \par INTERPRETATION: MRI OF THE LUMBAR SPINE \par \par CLINICAL INDICATION: Status post lumbar spinal fusion. Chronic L2 \par compression fracture. Lumbar radiculopathy symptoms. \par \par TECHNIQUE: Magnetic resonance imaging of the lumbar spine was performed \par utilizing sagittal T1, T2, and inversion recovery sequences as well as axial \par T2 coronal T1-weighted sequences. Comparison is with the prior lumbar spine \par MRI dated 4/10/2016 as well as prior thoracolumbar spine CT performed \par concurrently on 7/13/2018 and the remote CT of the lumbar spine dated \par 4/1/2016. \par \par FINDINGS: \par \par In the interval since the prior scans, the patient has undergone \par instrumented posterior spinal fusion from T11 through L4 with bilateral \par pedicle screws and interconnecting rods at all levels except at the L2 level \par where there is evidence of a chronic severe compression fracture deformity \par of the L2 vertebral body. \par \par There is mild bone marrow edema pattern about the bilateral L4 pedicle \par screws, which demonstrate findings of loosening on the CT scan performed \par concurrently. \par \par Posterior decompression at L2 and L3 has also been performed. There is \par osseous retropulsion of the posterior superior corner of the L2 vertebral \par body which is unchanged since the prior study. There is new fluid signal \par intensity within the L1-L2 disc space which is nonspecific in the post \par operative/post traumatic setting. However correlation for infectious \par discitis is requested. There is no adjacent bone marrow edema pattern within \par the L1 inferior endplate or within the markedly compressed and deformed L2 \par vertebral body. There is mild posterior paraspinal soft tissue edema \par posterior to the posterior to the laminectomy site likely reflecting \par postoperative change. \par \par Remaining vertebral body heights are preserved \par \par The conus medullaris terminates at the L1 1 level and is unremarkable in \par appearance. Multilevel mild to moderate disc degeneration is noted \par throughout the lumbar spine \par \par T12-L1: No central canal or neural foraminal stenosis. \par \par L1-L2: Osseous retropulsion of the posterior superior and posterior aspect \par of the L2 vertebral body secondary to chronic compression deformity. There \par is been interval posterior decompression since the prior study. There is \par persistent moderate spinal canal stenosis with slight crowding of nerve \par roots within the thecal sac. There is mild to moderate left and mild right \par foraminal stenosis. \par \par L2-L3: No posterior disc bulge or spinal canal stenosis. There is moderate \par right foraminal stenosis which appears less severe than the prior studies. \par There is mild left foraminal stenosis. \par \par L3-L4: Small disc bulge without spinal canal or foraminal stenosis. \par \par L4-L5: Small disc bulge and ligamentum flavum thickening with mild spinal \par canal stenosis and mild to moderate left foraminal stenosis. There is no \par right foraminal stenosis. \par \par L5-S1: Small disc bulge without spinal canal stenosis. Mild facet \par degeneration. There is moderate left and mild right foraminal stenosis which \par is unchanged since the prior MRI of April 2016 \par \par IMPRESSION: \par \par Interval posterior spinal fusion from T11 to L4 with posterior decompression \par at L2 and L3 as above. \par \par Chronic severe compression fracture deformity of the L2 vertebral body. \par Fluid signal within the L1-L2 disc space is nonspecific in the posttraumatic \par and postoperative setting and may reflect abnormal mechanics secondary to \par the spinal fusion, however an infectious discitis could've a similar \par appearance in the proper clinical context and therefore clinical correlation \par and laboratory correlation is requested. \par \par Despite posterior decompression there is moderate spinal canal stenosis at \par the mid and upper L2 levels secondary to osseous retropulsion of the \par chronically fractured L2 vertebral body. \par \par Moderate right foraminal stenosis at L2-L3 appears less severe when compared \par to the prior studies. \par \par Moderate left foraminal stenosis at L5-S1 is stable since the prior MRI scan \par of 4/10/2016. \par \par LINDSEY ESPITIA M.D., ATTENDING RADIOLOGIST \par This document has been electronically signed. Jul 18 2018 11:52AM \par \par \par \par 2 views AP and LAteral X-Ray T/L spine - 7/2/2018 - Laminectomy and Spinal Fusion T11-L4, L2 fracture in kyphosis, posterolateral fusion , Loosenig of screws\par \par \par MRI of L Spine with/without contrast [facility] 2019 - L45 moderate to severe stenosis, L5S1 Epidural lipomatosis, Adjacent segment degeneration. \par . \par \par  \par

## 2019-03-29 ENCOUNTER — OTHER (OUTPATIENT)
Age: 69
End: 2019-03-29

## 2019-04-01 ENCOUNTER — OTHER (OUTPATIENT)
Age: 69
End: 2019-04-01

## 2019-05-29 ENCOUNTER — APPOINTMENT (OUTPATIENT)
Dept: RADIOLOGY | Facility: HOSPITAL | Age: 69
End: 2019-05-29

## 2019-07-07 ENCOUNTER — FORM ENCOUNTER (OUTPATIENT)
Age: 69
End: 2019-07-07

## 2019-07-08 ENCOUNTER — APPOINTMENT (OUTPATIENT)
Dept: RADIOLOGY | Facility: HOSPITAL | Age: 69
End: 2019-07-08
Payer: MEDICARE

## 2019-07-08 ENCOUNTER — OUTPATIENT (OUTPATIENT)
Dept: OUTPATIENT SERVICES | Facility: HOSPITAL | Age: 69
LOS: 1 days | End: 2019-07-08
Payer: MEDICARE

## 2019-07-08 DIAGNOSIS — Z00.00 ENCOUNTER FOR GENERAL ADULT MEDICAL EXAMINATION WITHOUT ABNORMAL FINDINGS: ICD-10-CM

## 2019-07-08 DIAGNOSIS — M40.209 UNSPECIFIED KYPHOSIS, SITE UNSPECIFIED: ICD-10-CM

## 2019-07-08 DIAGNOSIS — Z98.890 OTHER SPECIFIED POSTPROCEDURAL STATES: Chronic | ICD-10-CM

## 2019-07-08 DIAGNOSIS — Z95.1 PRESENCE OF AORTOCORONARY BYPASS GRAFT: Chronic | ICD-10-CM

## 2019-07-08 PROCEDURE — 72129 CT CHEST SPINE W/DYE: CPT | Mod: 26

## 2019-07-08 PROCEDURE — 62305 MYELOGRAPHY LUMBAR INJECTION: CPT

## 2019-07-08 PROCEDURE — 72129 CT CHEST SPINE W/DYE: CPT

## 2019-07-08 PROCEDURE — 72132 CT LUMBAR SPINE W/DYE: CPT | Mod: 26

## 2019-07-08 PROCEDURE — 72132 CT LUMBAR SPINE W/DYE: CPT

## 2019-07-15 ENCOUNTER — APPOINTMENT (OUTPATIENT)
Dept: ORTHOPEDIC SURGERY | Facility: CLINIC | Age: 69
End: 2019-07-15
Payer: MEDICARE

## 2019-07-15 DIAGNOSIS — S32.009A UNSPECIFIED FRACTURE OF UNSPECIFIED LUMBAR VERTEBRA, INITIAL ENCOUNTER FOR CLOSED FRACTURE: ICD-10-CM

## 2019-07-15 PROCEDURE — 99215 OFFICE O/P EST HI 40 MIN: CPT

## 2019-08-13 ENCOUNTER — OUTPATIENT (OUTPATIENT)
Dept: OUTPATIENT SERVICES | Facility: HOSPITAL | Age: 69
LOS: 1 days | End: 2019-08-13
Payer: MEDICARE

## 2019-08-13 VITALS
SYSTOLIC BLOOD PRESSURE: 146 MMHG | TEMPERATURE: 98 F | HEART RATE: 87 BPM | HEIGHT: 64 IN | RESPIRATION RATE: 16 BRPM | WEIGHT: 164.91 LBS | DIASTOLIC BLOOD PRESSURE: 86 MMHG | OXYGEN SATURATION: 98 %

## 2019-08-13 DIAGNOSIS — I10 ESSENTIAL (PRIMARY) HYPERTENSION: ICD-10-CM

## 2019-08-13 DIAGNOSIS — Z98.890 OTHER SPECIFIED POSTPROCEDURAL STATES: Chronic | ICD-10-CM

## 2019-08-13 DIAGNOSIS — Z90.79 ACQUIRED ABSENCE OF OTHER GENITAL ORGAN(S): Chronic | ICD-10-CM

## 2019-08-13 DIAGNOSIS — Z01.818 ENCOUNTER FOR OTHER PREPROCEDURAL EXAMINATION: ICD-10-CM

## 2019-08-13 DIAGNOSIS — Z95.1 PRESENCE OF AORTOCORONARY BYPASS GRAFT: Chronic | ICD-10-CM

## 2019-08-13 DIAGNOSIS — M48.061 SPINAL STENOSIS, LUMBAR REGION WITHOUT NEUROGENIC CLAUDICATION: ICD-10-CM

## 2019-08-13 DIAGNOSIS — Z29.9 ENCOUNTER FOR PROPHYLACTIC MEASURES, UNSPECIFIED: ICD-10-CM

## 2019-08-13 DIAGNOSIS — E11.9 TYPE 2 DIABETES MELLITUS WITHOUT COMPLICATIONS: ICD-10-CM

## 2019-08-13 DIAGNOSIS — N40.0 BENIGN PROSTATIC HYPERPLASIA WITHOUT LOWER URINARY TRACT SYMPTOMS: ICD-10-CM

## 2019-08-13 DIAGNOSIS — Z78.9 OTHER SPECIFIED HEALTH STATUS: ICD-10-CM

## 2019-08-13 DIAGNOSIS — M48.07 SPINAL STENOSIS, LUMBOSACRAL REGION: ICD-10-CM

## 2019-08-13 LAB
ANION GAP SERPL CALC-SCNC: 16 MMOL/L — SIGNIFICANT CHANGE UP (ref 5–17)
BLD GP AB SCN SERPL QL: NEGATIVE — SIGNIFICANT CHANGE UP
BUN SERPL-MCNC: 41 MG/DL — HIGH (ref 7–23)
CALCIUM SERPL-MCNC: 9.5 MG/DL — SIGNIFICANT CHANGE UP (ref 8.4–10.5)
CHLORIDE SERPL-SCNC: 99 MMOL/L — SIGNIFICANT CHANGE UP (ref 96–108)
CO2 SERPL-SCNC: 21 MMOL/L — LOW (ref 22–31)
CREAT SERPL-MCNC: 2.05 MG/DL — HIGH (ref 0.5–1.3)
GLUCOSE SERPL-MCNC: 310 MG/DL — HIGH (ref 70–99)
POTASSIUM SERPL-MCNC: 4.9 MMOL/L — SIGNIFICANT CHANGE UP (ref 3.5–5.3)
POTASSIUM SERPL-SCNC: 4.9 MMOL/L — SIGNIFICANT CHANGE UP (ref 3.5–5.3)
RH IG SCN BLD-IMP: POSITIVE — SIGNIFICANT CHANGE UP
SODIUM SERPL-SCNC: 136 MMOL/L — SIGNIFICANT CHANGE UP (ref 135–145)

## 2019-08-13 PROCEDURE — 87641 MR-STAPH DNA AMP PROBE: CPT

## 2019-08-13 PROCEDURE — 86850 RBC ANTIBODY SCREEN: CPT

## 2019-08-13 PROCEDURE — 83036 HEMOGLOBIN GLYCOSYLATED A1C: CPT

## 2019-08-13 PROCEDURE — 85027 COMPLETE CBC AUTOMATED: CPT

## 2019-08-13 PROCEDURE — 86900 BLOOD TYPING SEROLOGIC ABO: CPT

## 2019-08-13 PROCEDURE — 86901 BLOOD TYPING SEROLOGIC RH(D): CPT

## 2019-08-13 PROCEDURE — 80048 BASIC METABOLIC PNL TOTAL CA: CPT

## 2019-08-13 PROCEDURE — G0463: CPT

## 2019-08-13 PROCEDURE — 87640 STAPH A DNA AMP PROBE: CPT

## 2019-08-13 RX ORDER — ZOLPIDEM TARTRATE 10 MG/1
1 TABLET ORAL
Qty: 0 | Refills: 0 | DISCHARGE

## 2019-08-13 RX ORDER — GABAPENTIN 400 MG/1
1 CAPSULE ORAL
Qty: 0 | Refills: 0 | DISCHARGE

## 2019-08-13 RX ORDER — TAMSULOSIN HYDROCHLORIDE 0.4 MG/1
1 CAPSULE ORAL
Qty: 0 | Refills: 0 | DISCHARGE

## 2019-08-13 RX ORDER — INSULIN GLARGINE 100 [IU]/ML
40 INJECTION, SOLUTION SUBCUTANEOUS
Qty: 0 | Refills: 0 | DISCHARGE

## 2019-08-13 RX ORDER — CEFAZOLIN SODIUM 1 G
2000 VIAL (EA) INJECTION ONCE
Refills: 0 | Status: DISCONTINUED | OUTPATIENT
Start: 2019-08-27 | End: 2019-08-28

## 2019-08-13 RX ORDER — METOPROLOL TARTRATE 50 MG
1 TABLET ORAL
Qty: 0 | Refills: 0 | DISCHARGE

## 2019-08-13 RX ORDER — CHOLECALCIFEROL (VITAMIN D3) 125 MCG
1 CAPSULE ORAL
Qty: 0 | Refills: 0 | DISCHARGE

## 2019-08-13 RX ORDER — FUROSEMIDE 40 MG
1 TABLET ORAL
Qty: 0 | Refills: 0 | DISCHARGE

## 2019-08-13 RX ORDER — SPIRONOLACTONE 25 MG/1
1 TABLET, FILM COATED ORAL
Qty: 0 | Refills: 0 | DISCHARGE

## 2019-08-13 NOTE — H&P PST ADULT - OTHER CARE PROVIDERS
Dr. Zachery Mcgarry, cardiologist, [phone] Dr. Zachery Mcgarry, cardiologist, [phone] - on vacation - could not make pre-op appointment, they will fax cardiac studies, Dr. Hamzah Huerta, urology, [phone], Dr. Hiram Soto, endocrinology

## 2019-08-13 NOTE — H&P PST ADULT - ATTENDING COMMENTS
69 yo male s/p Laminectomy and Spinal Fusion with progressive neurological claudication symptoms, from adjacent segment disease and stenosis and pseudarthrosis recommend Revision Laminectomy and Spinal Fusion T10-Pelvis. Patient failed PT, NSAIDS, and APRYL. increasing narcotic usage.    I reviewed all major risks, benefits, and complications associated with surgical intervention including but not limited to bleeding, infection, neurological injury, CSF leak, implant failure, implant migration, pedicle screw breech, pseudarthrosis, adjacent segment degeneration, hematoma, anesthetic risk, chronic pain and disability , medical complications (GI, , DVT, PE, cardiac, pulmonary, etc) and risk of mortality.

## 2019-08-13 NOTE — H&P PST ADULT - PRIMARY CARE PROVIDER
Dr. Chrystal Rico [phone] Dr. Chrystal Rico [phone] - has to make appointment for pre-op medical eval

## 2019-08-13 NOTE — H&P PST ADULT - NEGATIVE ENMT SYMPTOMS
no nasal congestion/no sinus symptoms no nasal congestion/no hearing difficulty/no ear pain/no sinus symptoms/no tinnitus/no vertigo

## 2019-08-13 NOTE — H&P PST ADULT - HISTORY OF PRESENT ILLNESS
67 y/o Tamaz[place] speaking M PMH old LBBB, CAD, S/P CABG 2015, chronic stable CHF, lumbar spinal stenosis, S/P lumbar laminectomy 5/2016,  incomplete bladder emptying, enuresis, valdes catheter placed 2 months ago, citrobacter freundii UTI, treated with antibiotics last week as per patient.  Presents today for button TURP. 69 yo [place] Islander speaking male from [facility], PMH CAD s/p CABG X 4 2015, CHF - no recent events, DM2, BPH s/p TURP 2017, hospitalized in 3/2019 with UTI 2/2 urinary retention - following up with urology, spinal stenosis s/p T10-L4 laminectomy and fusion 5/2016, chronic L2 fracture, loosening screws. Pt. still having low back pain that radiates to bilateral lower extremities R>L, has been treated with PT, NSAID's, APRYL without much relief - pt. presents to PST today for scheduled T10-Pelvis Posterior Revision Laminectomy and Spinal Fusion on 8/27/19. Pt. with SAINI - which is unchanged for many years, denies recent fever, chills, chest pain, SOB, changes in bowel/urinary habits.  Obtained last EKG and Echo from cardiologist office - in chart (EF 50-54%) 67 yo [place]e speaking male from [facility], PMH CAD s/p CABG X 4 2015, CHF - no recent events, DM2, BPH s/p TURP 2017, hospitalized in 3/2019 with UTI 2/2 urinary retention - following up with urology, spinal stenosis s/p T10-L4 laminectomy and fusion 5/2016, chronic L2 fracture, loosening screws. Pt. still having low back pain that radiates to bilateral lower extremities R>L, has been treated with PT, NSAID's, APRYL without much relief - pt. presents to PST today for scheduled T10-Pelvis Posterior Revision Laminectomy and Spinal Fusion on 8/27/19. Pt. with SAINI - which is unchanged for many years, denies recent fever, chills, chest pain, SOB, changes in bowel/urinary habits.  Obtained last EKG and Echo from cardiologist office - in chart (EF 50-54%)  Pt. will continue on aspirin during reynaldo-op period, pt. will see PCP pre-op

## 2019-08-13 NOTE — H&P PST ADULT - NSICDXPASTSURGICALHX_GEN_ALL_CORE_FT
PAST SURGICAL HISTORY:  S/P lumbar laminectomy 5/2016    S/P TURP 2017    Status post coronary artery bypass graft x4  10/8/2015

## 2019-08-13 NOTE — H&P PST ADULT - SOURCE OF INFORMATION, PROFILE
family/patient family/patient/pt.'s daughter, Yola, present during PST. Pt. prefers I communicate in Maltese during PST visit, declined telephonic

## 2019-08-13 NOTE — H&P PST ADULT - WEIGHT IN LBS
"No chief complaint on file.      Initial /79 (BP Location: Left arm, Patient Position: Chair, Cuff Size: Adult Large)  Pulse 92  Temp 99.2  F (37.3  C) (Tympanic)  Wt 200 lb (90.7 kg)  SpO2 96%  BMI 33.03 kg/m2 Estimated body mass index is 33.03 kg/(m^2) as calculated from the following:    Height as of 1/24/17: 5' 5.25\" (1.657 m).    Weight as of this encounter: 200 lb (90.7 kg).  Medication Reconciliation: complete     Ina Toussaint MA    "
164.9

## 2019-08-13 NOTE — H&P PST ADULT - NSICDXPROBLEM_GEN_ALL_CORE_FT
PROBLEM DIAGNOSES  Problem: Spinal stenosis of lumbar region at multiple levels  Assessment and Plan: T10-Pelvis Posterior Revision Laminectomy and Spinal Fusion  Pre-op instructions, including Chlorhexidine soap, provided - all questions answered    Problem: Hypertension  Assessment and Plan: Continue meds as indicated, including am of surgery with sip of water    Problem: BPH (benign prostatic hyperplasia)  Assessment and Plan: Continue meds as indicated, including am of surgery with sip of water    Problem: DM2 (diabetes mellitus, type 2)  Assessment and Plan: FS on arrival to [facility]    Problem: Language barrier  Assessment and Plan: Telephonic/On  to communicate in Argentine during hospital stay    Problem: Prophylactic measure  Assessment and Plan: The Caprini score indicates this patient is at risk for a VTE event (score 3-5).  Most surgical patients in this group would benefit from pharmacologic prophylaxis.  The surgical team will determine the balance between VTE risk and bleeding risk

## 2019-08-13 NOTE — H&P PST ADULT - NEUROLOGICAL SYMPTOMS
difficulty walking/paresthesias paresthesias/weakness/difficulty walking/bilateral lower extremities R>L

## 2019-08-13 NOTE — H&P PST ADULT - NSICDXPASTMEDICALHX_GEN_ALL_CORE_FT
PAST MEDICAL HISTORY:  Acute CHF 3/2019 hospitilized at [facility]    Acute UTI 3/2019 2/2 urinary retention 2/2 BPH    Benign prostatic hypertrophy     Coronary artery disease involving native coronary artery of native heart without angina pectoris     Dyslipidemia     Hypertension     LBBB (left bundle branch block)     Lumbar disc disease     Transient cerebral ischemia, unspecified type 10/2015    Type 2 diabetes mellitus PAST MEDICAL HISTORY:  Acute UTI 3/2019 2/2 urinary retention 2/2 BPH    Benign prostatic hypertrophy     Chronic low back pain     Coronary artery disease involving native coronary artery of native heart without angina pectoris     Dyslipidemia     ETOH abuse last drink 2015    Hypertension     LBBB (left bundle branch block) on EKG    Lumbar compression fracture chronic L2 fracture    Lumbar disc disease     Transient cerebral ischemia, unspecified type 10/2015    Type 2 diabetes mellitus

## 2019-08-14 LAB
HBA1C BLD-MCNC: 9.4 % — HIGH (ref 4–5.6)
HCT VFR BLD CALC: 41.5 % — SIGNIFICANT CHANGE UP (ref 39–50)
HGB BLD-MCNC: 13.7 G/DL — SIGNIFICANT CHANGE UP (ref 13–17)
MCHC RBC-ENTMCNC: 29.4 PG — SIGNIFICANT CHANGE UP (ref 27–34)
MCHC RBC-ENTMCNC: 33 GM/DL — SIGNIFICANT CHANGE UP (ref 32–36)
MCV RBC AUTO: 89.1 FL — SIGNIFICANT CHANGE UP (ref 80–100)
MRSA PCR RESULT.: SIGNIFICANT CHANGE UP
PLATELET # BLD AUTO: 241 K/UL — SIGNIFICANT CHANGE UP (ref 150–400)
RBC # BLD: 4.66 M/UL — SIGNIFICANT CHANGE UP (ref 4.2–5.8)
RBC # FLD: 13.3 % — SIGNIFICANT CHANGE UP (ref 10.3–14.5)
S AUREUS DNA NOSE QL NAA+PROBE: DETECTED
WBC # BLD: 8.77 K/UL — SIGNIFICANT CHANGE UP (ref 3.8–10.5)
WBC # FLD AUTO: 8.77 K/UL — SIGNIFICANT CHANGE UP (ref 3.8–10.5)

## 2019-08-16 ENCOUNTER — RX RENEWAL (OUTPATIENT)
Age: 69
End: 2019-08-16

## 2019-08-16 RX ORDER — MUPIROCIN 20 MG/G
2 OINTMENT TOPICAL
Qty: 2 | Refills: 0 | Status: ACTIVE | COMMUNITY
Start: 2019-08-16 | End: 1900-01-01

## 2019-08-19 ENCOUNTER — APPOINTMENT (OUTPATIENT)
Dept: ORTHOPEDIC SURGERY | Facility: CLINIC | Age: 69
End: 2019-08-19
Payer: MEDICARE

## 2019-08-19 PROBLEM — I44.7 LEFT BUNDLE-BRANCH BLOCK, UNSPECIFIED: Chronic | Status: ACTIVE | Noted: 2017-06-15

## 2019-08-19 PROBLEM — M54.5 LOW BACK PAIN: Chronic | Status: ACTIVE | Noted: 2019-08-13

## 2019-08-19 PROBLEM — F10.10 ALCOHOL ABUSE, UNCOMPLICATED: Chronic | Status: ACTIVE | Noted: 2019-08-13

## 2019-08-19 PROBLEM — S32.000A WEDGE COMPRESSION FRACTURE OF UNSPECIFIED LUMBAR VERTEBRA, INITIAL ENCOUNTER FOR CLOSED FRACTURE: Chronic | Status: ACTIVE | Noted: 2019-08-13

## 2019-08-19 PROCEDURE — 99215 OFFICE O/P EST HI 40 MIN: CPT

## 2019-08-26 ENCOUNTER — TRANSCRIPTION ENCOUNTER (OUTPATIENT)
Age: 69
End: 2019-08-26

## 2019-08-27 ENCOUNTER — APPOINTMENT (OUTPATIENT)
Dept: ORTHOPEDIC SURGERY | Facility: HOSPITAL | Age: 69
End: 2019-08-27
Payer: MEDICARE

## 2019-08-27 ENCOUNTER — INPATIENT (INPATIENT)
Facility: HOSPITAL | Age: 69
LOS: 14 days | Discharge: NURSING, REHAB OR LONG-TERM CARE | DRG: 454 | End: 2019-09-11
Attending: ORTHOPAEDIC SURGERY | Admitting: ORTHOPAEDIC SURGERY
Payer: MEDICARE

## 2019-08-27 VITALS
SYSTOLIC BLOOD PRESSURE: 158 MMHG | WEIGHT: 164.91 LBS | DIASTOLIC BLOOD PRESSURE: 97 MMHG | HEIGHT: 64 IN | OXYGEN SATURATION: 96 % | HEART RATE: 104 BPM | RESPIRATION RATE: 18 BRPM | TEMPERATURE: 99 F

## 2019-08-27 DIAGNOSIS — Z90.79 ACQUIRED ABSENCE OF OTHER GENITAL ORGAN(S): Chronic | ICD-10-CM

## 2019-08-27 DIAGNOSIS — Z98.890 OTHER SPECIFIED POSTPROCEDURAL STATES: Chronic | ICD-10-CM

## 2019-08-27 DIAGNOSIS — M48.07 SPINAL STENOSIS, LUMBOSACRAL REGION: ICD-10-CM

## 2019-08-27 DIAGNOSIS — Z95.1 PRESENCE OF AORTOCORONARY BYPASS GRAFT: Chronic | ICD-10-CM

## 2019-08-27 LAB
ANION GAP SERPL CALC-SCNC: 14 MMOL/L — SIGNIFICANT CHANGE UP (ref 5–17)
BUN SERPL-MCNC: 31 MG/DL — HIGH (ref 7–23)
CALCIUM SERPL-MCNC: 7.9 MG/DL — LOW (ref 8.4–10.5)
CHLORIDE SERPL-SCNC: 107 MMOL/L — SIGNIFICANT CHANGE UP (ref 96–108)
CO2 SERPL-SCNC: 21 MMOL/L — LOW (ref 22–31)
CREAT SERPL-MCNC: 2.01 MG/DL — HIGH (ref 0.5–1.3)
GAS PNL BLDA: SIGNIFICANT CHANGE UP
GLUCOSE BLDC GLUCOMTR-MCNC: 185 MG/DL — HIGH (ref 70–99)
GLUCOSE BLDC GLUCOMTR-MCNC: 202 MG/DL — HIGH (ref 70–99)
GLUCOSE SERPL-MCNC: 148 MG/DL — HIGH (ref 70–99)
HCT VFR BLD CALC: 25.2 % — LOW (ref 39–50)
HGB BLD-MCNC: 8.4 G/DL — LOW (ref 13–17)
MCHC RBC-ENTMCNC: 30 PG — SIGNIFICANT CHANGE UP (ref 27–34)
MCHC RBC-ENTMCNC: 33.5 GM/DL — SIGNIFICANT CHANGE UP (ref 32–36)
MCV RBC AUTO: 89.4 FL — SIGNIFICANT CHANGE UP (ref 80–100)
PLATELET # BLD AUTO: 117 K/UL — LOW (ref 150–400)
POTASSIUM SERPL-MCNC: 4.6 MMOL/L — SIGNIFICANT CHANGE UP (ref 3.5–5.3)
POTASSIUM SERPL-SCNC: 4.6 MMOL/L — SIGNIFICANT CHANGE UP (ref 3.5–5.3)
RBC # BLD: 2.82 M/UL — LOW (ref 4.2–5.8)
RBC # FLD: 11.9 % — SIGNIFICANT CHANGE UP (ref 10.3–14.5)
SODIUM SERPL-SCNC: 142 MMOL/L — SIGNIFICANT CHANGE UP (ref 135–145)
WBC # BLD: 6.3 K/UL — SIGNIFICANT CHANGE UP (ref 3.8–10.5)
WBC # FLD AUTO: 6.3 K/UL — SIGNIFICANT CHANGE UP (ref 3.8–10.5)

## 2019-08-27 PROCEDURE — 63048 LAM FACETEC &FORAMOT EA ADDL: CPT | Mod: 82

## 2019-08-27 PROCEDURE — 63047 LAM FACETEC & FORAMOT LUMBAR: CPT

## 2019-08-27 PROCEDURE — 22842 INSERT SPINE FIXATION DEVICE: CPT | Mod: 82,59

## 2019-08-27 PROCEDURE — 22830 EXPLORATION OF SPINAL FUSION: CPT | Mod: 82,59

## 2019-08-27 PROCEDURE — 22849 REINSERT SPINAL FIXATION: CPT

## 2019-08-27 PROCEDURE — 22612 ARTHRD PST TQ 1NTRSPC LUMBAR: CPT

## 2019-08-27 PROCEDURE — 63047 LAM FACETEC & FORAMOT LUMBAR: CPT | Mod: 82

## 2019-08-27 PROCEDURE — 61783 SCAN PROC SPINAL: CPT | Mod: 59

## 2019-08-27 PROCEDURE — 22612 ARTHRD PST TQ 1NTRSPC LUMBAR: CPT | Mod: 82

## 2019-08-27 PROCEDURE — 22830 EXPLORATION OF SPINAL FUSION: CPT | Mod: 59

## 2019-08-27 PROCEDURE — 22614 ARTHRD PST TQ 1NTRSPC EA ADD: CPT | Mod: 82

## 2019-08-27 PROCEDURE — 22849 REINSERT SPINAL FIXATION: CPT | Mod: 82

## 2019-08-27 PROCEDURE — 63048 LAM FACETEC &FORAMOT EA ADDL: CPT

## 2019-08-27 PROCEDURE — 51702 INSERT TEMP BLADDER CATH: CPT

## 2019-08-27 PROCEDURE — 22614 ARTHRD PST TQ 1NTRSPC EA ADD: CPT

## 2019-08-27 PROCEDURE — 22842 INSERT SPINE FIXATION DEVICE: CPT | Mod: 59

## 2019-08-27 PROCEDURE — 99291 CRITICAL CARE FIRST HOUR: CPT

## 2019-08-27 RX ORDER — INSULIN LISPRO 100/ML
VIAL (ML) SUBCUTANEOUS AT BEDTIME
Refills: 0 | Status: DISCONTINUED | OUTPATIENT
Start: 2019-08-27 | End: 2019-09-03

## 2019-08-27 RX ORDER — INSULIN GLARGINE 100 [IU]/ML
20 INJECTION, SOLUTION SUBCUTANEOUS AT BEDTIME
Refills: 0 | Status: DISCONTINUED | OUTPATIENT
Start: 2019-08-27 | End: 2019-08-28

## 2019-08-27 RX ORDER — FERROUS SULFATE 325(65) MG
325 TABLET ORAL DAILY
Refills: 0 | Status: DISCONTINUED | OUTPATIENT
Start: 2019-08-27 | End: 2019-09-03

## 2019-08-27 RX ORDER — NALOXONE HYDROCHLORIDE 4 MG/.1ML
0.1 SPRAY NASAL
Refills: 0 | Status: DISCONTINUED | OUTPATIENT
Start: 2019-08-27 | End: 2019-08-30

## 2019-08-27 RX ORDER — SODIUM CHLORIDE 9 MG/ML
1000 INJECTION INTRAMUSCULAR; INTRAVENOUS; SUBCUTANEOUS
Refills: 0 | Status: DISCONTINUED | OUTPATIENT
Start: 2019-08-27 | End: 2019-08-28

## 2019-08-27 RX ORDER — CEFAZOLIN SODIUM 1 G
2000 VIAL (EA) INJECTION EVERY 8 HOURS
Refills: 0 | Status: COMPLETED | OUTPATIENT
Start: 2019-08-27 | End: 2019-08-28

## 2019-08-27 RX ORDER — HYDROMORPHONE HYDROCHLORIDE 2 MG/ML
30 INJECTION INTRAMUSCULAR; INTRAVENOUS; SUBCUTANEOUS
Refills: 0 | Status: DISCONTINUED | OUTPATIENT
Start: 2019-08-27 | End: 2019-08-30

## 2019-08-27 RX ORDER — TRAMADOL HYDROCHLORIDE 50 MG/1
50 TABLET ORAL EVERY 4 HOURS
Refills: 0 | Status: DISCONTINUED | OUTPATIENT
Start: 2019-08-27 | End: 2019-08-27

## 2019-08-27 RX ORDER — SODIUM CHLORIDE 9 MG/ML
1000 INJECTION, SOLUTION INTRAVENOUS
Refills: 0 | Status: DISCONTINUED | OUTPATIENT
Start: 2019-08-27 | End: 2019-08-28

## 2019-08-27 RX ORDER — CEFAZOLIN SODIUM 1 G
2000 VIAL (EA) INJECTION EVERY 8 HOURS
Refills: 0 | Status: DISCONTINUED | OUTPATIENT
Start: 2019-08-27 | End: 2019-08-27

## 2019-08-27 RX ORDER — ONDANSETRON 8 MG/1
4 TABLET, FILM COATED ORAL ONCE
Refills: 0 | Status: DISCONTINUED | OUTPATIENT
Start: 2019-08-27 | End: 2019-08-28

## 2019-08-27 RX ORDER — DEXTROSE 50 % IN WATER 50 %
25 SYRINGE (ML) INTRAVENOUS ONCE
Refills: 0 | Status: DISCONTINUED | OUTPATIENT
Start: 2019-08-27 | End: 2019-08-28

## 2019-08-27 RX ORDER — ATORVASTATIN CALCIUM 80 MG/1
10 TABLET, FILM COATED ORAL AT BEDTIME
Refills: 0 | Status: DISCONTINUED | OUTPATIENT
Start: 2019-08-27 | End: 2019-09-03

## 2019-08-27 RX ORDER — ACETAMINOPHEN 500 MG
1000 TABLET ORAL ONCE
Refills: 0 | Status: COMPLETED | OUTPATIENT
Start: 2019-08-28 | End: 2019-08-28

## 2019-08-27 RX ORDER — ONDANSETRON 8 MG/1
4 TABLET, FILM COATED ORAL EVERY 6 HOURS
Refills: 0 | Status: DISCONTINUED | OUTPATIENT
Start: 2019-08-27 | End: 2019-09-03

## 2019-08-27 RX ORDER — ACETAMINOPHEN 500 MG
1000 TABLET ORAL ONCE
Refills: 0 | Status: COMPLETED | OUTPATIENT
Start: 2019-08-28 | End: 2019-08-27

## 2019-08-27 RX ORDER — HYDROMORPHONE HYDROCHLORIDE 2 MG/ML
0.5 INJECTION INTRAMUSCULAR; INTRAVENOUS; SUBCUTANEOUS
Refills: 0 | Status: DISCONTINUED | OUTPATIENT
Start: 2019-08-27 | End: 2019-08-28

## 2019-08-27 RX ORDER — SODIUM CHLORIDE 9 MG/ML
3 INJECTION INTRAMUSCULAR; INTRAVENOUS; SUBCUTANEOUS EVERY 8 HOURS
Refills: 0 | Status: DISCONTINUED | OUTPATIENT
Start: 2019-08-27 | End: 2019-08-27

## 2019-08-27 RX ORDER — CHOLECALCIFEROL (VITAMIN D3) 125 MCG
2000 CAPSULE ORAL DAILY
Refills: 0 | Status: DISCONTINUED | OUTPATIENT
Start: 2019-08-27 | End: 2019-09-03

## 2019-08-27 RX ORDER — DEXTROSE 50 % IN WATER 50 %
15 SYRINGE (ML) INTRAVENOUS ONCE
Refills: 0 | Status: DISCONTINUED | OUTPATIENT
Start: 2019-08-27 | End: 2019-08-28

## 2019-08-27 RX ORDER — FUROSEMIDE 40 MG
20 TABLET ORAL AT BEDTIME
Refills: 0 | Status: DISCONTINUED | OUTPATIENT
Start: 2019-08-27 | End: 2019-08-28

## 2019-08-27 RX ORDER — TAMSULOSIN HYDROCHLORIDE 0.4 MG/1
0.4 CAPSULE ORAL
Refills: 0 | Status: DISCONTINUED | OUTPATIENT
Start: 2019-08-27 | End: 2019-09-03

## 2019-08-27 RX ORDER — FINASTERIDE 5 MG/1
5 TABLET, FILM COATED ORAL DAILY
Refills: 0 | Status: DISCONTINUED | OUTPATIENT
Start: 2019-08-27 | End: 2019-09-03

## 2019-08-27 RX ORDER — GLUCAGON INJECTION, SOLUTION 0.5 MG/.1ML
1 INJECTION, SOLUTION SUBCUTANEOUS ONCE
Refills: 0 | Status: DISCONTINUED | OUTPATIENT
Start: 2019-08-27 | End: 2019-08-28

## 2019-08-27 RX ORDER — PANTOPRAZOLE SODIUM 20 MG/1
40 TABLET, DELAYED RELEASE ORAL
Refills: 0 | Status: DISCONTINUED | OUTPATIENT
Start: 2019-08-27 | End: 2019-09-03

## 2019-08-27 RX ORDER — DEXTROSE 50 % IN WATER 50 %
12.5 SYRINGE (ML) INTRAVENOUS ONCE
Refills: 0 | Status: DISCONTINUED | OUTPATIENT
Start: 2019-08-27 | End: 2019-08-28

## 2019-08-27 RX ORDER — LIDOCAINE HCL 20 MG/ML
0.2 VIAL (ML) INJECTION ONCE
Refills: 0 | Status: DISCONTINUED | OUTPATIENT
Start: 2019-08-27 | End: 2019-08-27

## 2019-08-27 RX ORDER — INSULIN LISPRO 100/ML
5 VIAL (ML) SUBCUTANEOUS
Refills: 0 | Status: DISCONTINUED | OUTPATIENT
Start: 2019-08-27 | End: 2019-08-28

## 2019-08-27 RX ORDER — INSULIN LISPRO 100/ML
VIAL (ML) SUBCUTANEOUS
Refills: 0 | Status: DISCONTINUED | OUTPATIENT
Start: 2019-08-27 | End: 2019-09-03

## 2019-08-27 RX ORDER — LOSARTAN POTASSIUM 100 MG/1
25 TABLET, FILM COATED ORAL DAILY
Refills: 0 | Status: DISCONTINUED | OUTPATIENT
Start: 2019-08-27 | End: 2019-08-28

## 2019-08-27 RX ADMIN — Medication 400 MILLIGRAM(S): at 22:33

## 2019-08-27 RX ADMIN — Medication 1000 MILLIGRAM(S): at 22:59

## 2019-08-27 RX ADMIN — HYDROMORPHONE HYDROCHLORIDE 30 MILLILITER(S): 2 INJECTION INTRAMUSCULAR; INTRAVENOUS; SUBCUTANEOUS at 18:30

## 2019-08-27 RX ADMIN — SODIUM CHLORIDE 75 MILLILITER(S): 9 INJECTION INTRAMUSCULAR; INTRAVENOUS; SUBCUTANEOUS at 18:16

## 2019-08-27 RX ADMIN — HYDROMORPHONE HYDROCHLORIDE 0.5 MILLIGRAM(S): 2 INJECTION INTRAMUSCULAR; INTRAVENOUS; SUBCUTANEOUS at 18:00

## 2019-08-27 RX ADMIN — Medication 100 MILLIGRAM(S): at 22:00

## 2019-08-27 RX ADMIN — ATORVASTATIN CALCIUM 10 MILLIGRAM(S): 80 TABLET, FILM COATED ORAL at 21:57

## 2019-08-27 RX ADMIN — Medication 20 MILLIGRAM(S): at 21:57

## 2019-08-27 RX ADMIN — HYDROMORPHONE HYDROCHLORIDE 0.5 MILLIGRAM(S): 2 INJECTION INTRAMUSCULAR; INTRAVENOUS; SUBCUTANEOUS at 18:20

## 2019-08-27 RX ADMIN — INSULIN GLARGINE 20 UNIT(S): 100 INJECTION, SOLUTION SUBCUTANEOUS at 22:34

## 2019-08-27 NOTE — CONSULT NOTE ADULT - SUBJECTIVE AND OBJECTIVE BOX
SICU CONSULT NOTE    HPI  BRITANY JORGE is a 68y Male with a history of spinal stenosis s/p T10-L4 laminectomy and fusion 5/2016, Pt. was still having low back pain that radiated to bilateral lower extremities R>L, also with right sided lower extremity paresthesias. Today pt underwent T11-L4 revision laminectomy, L4-S1 posterior spinal fusion, and SICU was consulted for Q1h neurological checks overnight. Pt also has a PMH of CAD, CHF, HTN, DM, LBBB, BPH, and former EtOH abuse, and is s/p CABG x 4 (2015), TURP (2017), and lumbar laminectomy and fusion (2016).   Postoperatively patient seen and examined, in no acute distress. Complains of lower back pain around incision site and persistent numbness of right shin/calf area, pain in right foot, which is unchanged from pre-op. Also endorses hunger and thirst, no other complaints at this time.    Surgery Information:  T11-L4 revision laminectomy, L4-S1 posterior spinal fusion. Plastics closure with placement of Hemovac x 2 and ELBERT drain x 1.  EBL: 1700mL	IV Fluids: 3.5L NS, 1.5L Albumin	Blood Products: 1 PRBC	Urine Output: 1400mL  Complications: None      PAST MEDICAL HISTORY: ETOH abuse  Acute CHF  LBBB (left bundle branch block)  Hypertension  Type 2 diabetes mellitus  Lumbar disc disease  Transient cerebral ischemia, unspecified type  Coronary artery disease involving native coronary artery of native heart without angina pectoris  Chronic low back pain  Lumbar compression fracture  Acute UTI  Dyslipidemia  Benign prostatic hypertrophy      PAST SURGICAL HISTORY: S/P TURP  S/P lumbar laminectomy  Status post coronary artery bypass graft x4  TURP      SOCIAL HISTORY:  Former EtOH abuse, last drink 2015    CODE STATUS:   Full Code    HOME MEDICATIONS:  aspirin 81 mg oral delayed release tablet: 1 tab(s) orally once a day (at bedtime) (27 Aug 2019 06:56)  atorvastatin 10 mg oral tablet: 1 tab(s) orally once a day (at bedtime) (27 Aug 2019 06:56)  Basaglar KwikPen 100 units/mL subcutaneous solution: 40 unit(s) subcutaneous once a day (at bedtime). Pt. will decrease to 32 units night before procedure (27 Aug 2019 06:56)  ferrous sulfate 325 mg (65 mg elemental iron) oral tablet: 1 tab(s) orally once a day (27 Aug 2019 06:56)  finasteride 5 mg oral tablet: 1 tab(s) orally once a day (at bedtime) (27 Aug 2019 06:56)  furosemide 20 mg oral tablet: 1 tab(s) orally once a day (at bedtime) (27 Aug 2019 06:56)  HumaLOG 100 units/mL subcutaneous solution: 10 unit(s) subcutaneous 3 times a day (before meals) (27 Aug 2019 06:56)  losartan 25 mg oral tablet: 1 tab(s) orally once a day (27 Aug 2019 06:56)  Lyrica 100 mg oral capsule: 1 cap(s) orally 2 times a day (27 Aug 2019 06:56)  oxycodone-acetaminophen 5 mg-325 mg oral tablet: 1 tab(s) orally 3 times a day, As Needed (27 Aug 2019 06:56)  pantoprazole 40 mg oral delayed release tablet: 1 tab(s) orally once a day (27 Aug 2019 06:56)  tamsulosin 0.4 mg oral capsule: 1 cap(s) orally 2 times a day (27 Aug 2019 06:56)  traMADol 50 mg oral tablet: 1 tab(s) orally 2 times a day, As Needed (27 Aug 2019 06:56)  Vitamin D3 2000 intl units oral tablet: 1 tab(s) orally once a day (27 Aug 2019 06:56)      ALLERGIES: No Known Allergies      VITAL SIGNS:  ICU Vital Signs Last 24 Hrs  T(C): 36.2 (27 Aug 2019 17:40), Max: 37 (27 Aug 2019 07:10)  T(F): 97.2 (27 Aug 2019 17:40), Max: 98.6 (27 Aug 2019 07:10)  HR: 107 (27 Aug 2019 19:00) (101 - 110)  BP: 110/71 (27 Aug 2019 19:00) (106/55 - 158/97)  BP(mean): 87 (27 Aug 2019 19:00) (70 - 88)  ABP: 139/64 (27 Aug 2019 19:00) (119/61 - 139/64)  ABP(mean): 87 (27 Aug 2019 19:00) (78 - 87)  RR: 16 (27 Aug 2019 19:00) (16 - 18)  SpO2: 98% (27 Aug 2019 19:00) (95% - 100%)      NEURO  Exam: GCS 15, AAOx3. Strength grossly intact all 4 extremities 5/5. Paresthesia with moderate numbness of RLE from knee to ankle, and pain described as shooting in dorsal foot. Sensation of LLE and b/l US grossly intact.     HYDROmorphone  Injectable 0.5 milliGRAM(s) IV Push every 10 minutes PRN Moderate Pain (4 - 6)  HYDROmorphone PCA (1 mG/mL) 30 milliLiter(s) PCA Continuous PCA Continuous  ondansetron Injectable 4 milliGRAM(s) IV Push every 6 hours PRN Nausea  ondansetron Injectable 4 milliGRAM(s) IV Push once PRN Nausea and/or Vomiting  pregabalin 100 milliGRAM(s) Oral two times a day      RESPIRATORY    Exam: Nonlabored, CTABL, no wheezes, ronchi, or rales. Normal respiratory effort.     ABG - ( 27 Aug 2019 16:07 )  pH: 7.39  /  pCO2: 36    /  pO2: 240   / HCO3: 22    / Base Excess: -2.6  /  SaO2: 100     Lactate: x            CARDIOVASCULAR    Exam: Normotensive, RRR, no M/R/G   Cardiac Rhythm: NSR    furosemide    Tablet 20 milliGRAM(s) Oral at bedtime  losartan 25 milliGRAM(s) Oral daily  tamsulosin 0.4 milliGRAM(s) Oral two times a day      GI/NUTRITION  Exam: Abdomen soft, NT/ND, no rebound or guarding   Diet: Regular    pantoprazole    Tablet 40 milliGRAM(s) Oral before breakfast      GENITOURINARY/RENAL  cholecalciferol 2000 Unit(s) Oral daily  dextrose 5%. 1000 milliLiter(s) IV Continuous <Continuous>  ferrous    sulfate 325 milliGRAM(s) Oral daily  sodium chloride 0.9%. 1000 milliLiter(s) IV Continuous <Continuous>      08-27 @ 07:01  -  08-27 @ 19:34  --------------------------------------------------------  IN:    sodium chloride 0.9%.: 75 mL  Total IN: 75 mL    OUT:    Accordian: 60 mL    Accordian: 20 mL    Bulb: 10 mL  Total OUT: 90 mL    Total NET: -15 mL        Weight (kg): 74.8 (08-27 @ 12:35)  08-27    142  |  107  |  31<H>  ----------------------------<  148<H>  4.6   |  21<L>  |  2.01<H>    Ca    7.9<L>      27 Aug 2019 18:19      [ ] Peters catheter, indication: urine output monitoring in critically ill patient    HEMATOLOGIC  [X ] VTE Prophylaxis: Venodynes                          8.4    6.3   )-----------( 117      ( 27 Aug 2019 18:19 )             25.2       Transfusion: [X ] 1 PRBC	[ ] Platelets	[ ] FFP	[ ] Cryoprecipitate      INFECTIOUS DISEASES  ceFAZolin   IVPB 2000 milliGRAM(s) IV Intermittent every 8 hours  ceFAZolin   IVPB 2000 milliGRAM(s) IV Intermittent once    RECENT CULTURES: None      ENDOCRINE  atorvastatin 10 milliGRAM(s) Oral at bedtime  dextrose 40% Gel 15 Gram(s) Oral once PRN  dextrose 50% Injectable 12.5 Gram(s) IV Push once  dextrose 50% Injectable 25 Gram(s) IV Push once  dextrose 50% Injectable 25 Gram(s) IV Push once  finasteride 5 milliGRAM(s) Oral daily  glucagon  Injectable 1 milliGRAM(s) IntraMuscular once PRN  insulin glargine Injectable (LANTUS) 20 Unit(s) SubCutaneous at bedtime  insulin lispro (HumaLOG) corrective regimen sliding scale   SubCutaneous three times a day before meals  insulin lispro (HumaLOG) corrective regimen sliding scale   SubCutaneous at bedtime  insulin lispro Injectable (HumaLOG) 5 Unit(s) SubCutaneous three times a day before meals    CAPILLARY BLOOD GLUCOSE      POCT Blood Glucose.: 185 mg/dL (27 Aug 2019 07:03)      PATIENT CARE ACCESS DEVICES:  [ X] Peripheral IV  [ ] Central Venous Line	[ ] R	[ ] L	[ ] IJ	[ ] Fem	[ ] SC	Placed:   [ ] Arterial Line		[ ] R	[ ] L	[ ] Fem	[ ] Rad	[ ] Ax	Placed:   [ ] PICC:					[ ] Mediport  [ ] Urinary Catheter, Date Placed:   [x] Necessity of urinary, arterial, and venous catheters discussed    OTHER MEDICATIONS: naloxone Injectable 0.1 milliGRAM(s) IV Push every 3 minutes PRN      IMAGING STUDIES: None SICU CONSULT NOTE    HPI  BRITANY JORGE is a 68y Male with a history of spinal stenosis s/p T10-L4 laminectomy and fusion 5/2016, Pt. was still having low back pain that radiated to bilateral lower extremities R>L, also with right sided lower extremity paresthesias. Today pt underwent T11-L4 revision laminectomy, L4-S1 posterior spinal fusion, and SICU was consulted for Q1h neurological checks overnight. Pt also has a PMH of CAD, CHF, HTN, DM, LBBB, BPH, and former EtOH abuse, and is s/p CABG x 4 (2015), TURP (2017), and lumbar laminectomy and fusion (2016).   Postoperatively patient seen and examined, in no acute distress. Complains of lower back pain around incision site and persistent numbness of right shin/calf area, pain in right foot, which is unchanged from pre-op. Also endorses hunger and thirst, no other complaints at this time.    Surgery Information:  T11-L4 revision laminectomy, L4-S1 posterior spinal fusion. Plastics closure with placement of Hemovac x 2 and ELBERT drain x 1.  EBL: 1700mL	IV Fluids: 3.5L NS, 1.5L Albumin	Blood Products: 1 PRBC	Urine Output: 1400mL  Complications: None      PAST MEDICAL HISTORY: ETOH abuse  Acute CHF  LBBB (left bundle branch block)  Hypertension  Type 2 diabetes mellitus  Lumbar disc disease  Transient cerebral ischemia, unspecified type  Coronary artery disease involving native coronary artery of native heart without angina pectoris  Chronic low back pain  Lumbar compression fracture  Acute UTI  Dyslipidemia  Benign prostatic hypertrophy      PAST SURGICAL HISTORY: S/P TURP  S/P lumbar laminectomy  Status post coronary artery bypass graft x4  TURP      SOCIAL HISTORY:  Former EtOH abuse, last drink 2015    CODE STATUS:   Full Code    HOME MEDICATIONS:  aspirin 81 mg oral delayed release tablet: 1 tab(s) orally once a day (at bedtime) (27 Aug 2019 06:56)  atorvastatin 10 mg oral tablet: 1 tab(s) orally once a day (at bedtime) (27 Aug 2019 06:56)  Basaglar KwikPen 100 units/mL subcutaneous solution: 40 unit(s) subcutaneous once a day (at bedtime). Pt. will decrease to 32 units night before procedure (27 Aug 2019 06:56)  ferrous sulfate 325 mg (65 mg elemental iron) oral tablet: 1 tab(s) orally once a day (27 Aug 2019 06:56)  finasteride 5 mg oral tablet: 1 tab(s) orally once a day (at bedtime) (27 Aug 2019 06:56)  furosemide 20 mg oral tablet: 1 tab(s) orally once a day (at bedtime) (27 Aug 2019 06:56)  HumaLOG 100 units/mL subcutaneous solution: 10 unit(s) subcutaneous 3 times a day (before meals) (27 Aug 2019 06:56)  losartan 25 mg oral tablet: 1 tab(s) orally once a day (27 Aug 2019 06:56)  Lyrica 100 mg oral capsule: 1 cap(s) orally 2 times a day (27 Aug 2019 06:56)  oxycodone-acetaminophen 5 mg-325 mg oral tablet: 1 tab(s) orally 3 times a day, As Needed (27 Aug 2019 06:56)  pantoprazole 40 mg oral delayed release tablet: 1 tab(s) orally once a day (27 Aug 2019 06:56)  tamsulosin 0.4 mg oral capsule: 1 cap(s) orally 2 times a day (27 Aug 2019 06:56)  traMADol 50 mg oral tablet: 1 tab(s) orally 2 times a day, As Needed (27 Aug 2019 06:56)  Vitamin D3 2000 intl units oral tablet: 1 tab(s) orally once a day (27 Aug 2019 06:56)      ALLERGIES: No Known Allergies      VITAL SIGNS:  ICU Vital Signs Last 24 Hrs  T(C): 36.2 (27 Aug 2019 17:40), Max: 37 (27 Aug 2019 07:10)  T(F): 97.2 (27 Aug 2019 17:40), Max: 98.6 (27 Aug 2019 07:10)  HR: 107 (27 Aug 2019 19:00) (101 - 110)  BP: 110/71 (27 Aug 2019 19:00) (106/55 - 158/97)  BP(mean): 87 (27 Aug 2019 19:00) (70 - 88)  ABP: 139/64 (27 Aug 2019 19:00) (119/61 - 139/64)  ABP(mean): 87 (27 Aug 2019 19:00) (78 - 87)  RR: 16 (27 Aug 2019 19:00) (16 - 18)  SpO2: 98% (27 Aug 2019 19:00) (95% - 100%)      NEURO  Exam: GCS 15, AAOx3. Strength grossly intact all 4 extremities 5/5. Paresthesia with moderate numbness of RLE from knee to ankle, and pain described as shooting in dorsal foot. Sensation of LLE and b/l UE grossly intact.     HYDROmorphone  Injectable 0.5 milliGRAM(s) IV Push every 10 minutes PRN Moderate Pain (4 - 6)  HYDROmorphone PCA (1 mG/mL) 30 milliLiter(s) PCA Continuous PCA Continuous  ondansetron Injectable 4 milliGRAM(s) IV Push every 6 hours PRN Nausea  ondansetron Injectable 4 milliGRAM(s) IV Push once PRN Nausea and/or Vomiting  pregabalin 100 milliGRAM(s) Oral two times a day      RESPIRATORY    Exam: Nonlabored, CTABL, no wheezes, ronchi, or rales. Normal respiratory effort.     ABG - ( 27 Aug 2019 16:07 )  pH: 7.39  /  pCO2: 36    /  pO2: 240   / HCO3: 22    / Base Excess: -2.6  /  SaO2: 100     Lactate: x            CARDIOVASCULAR    Exam: Normotensive, RRR, no M/R/G   Cardiac Rhythm: NSR    furosemide    Tablet 20 milliGRAM(s) Oral at bedtime  losartan 25 milliGRAM(s) Oral daily  tamsulosin 0.4 milliGRAM(s) Oral two times a day      GI/NUTRITION  Exam: Abdomen soft, NT/ND, no rebound or guarding   Diet: Regular    pantoprazole    Tablet 40 milliGRAM(s) Oral before breakfast      GENITOURINARY/RENAL  cholecalciferol 2000 Unit(s) Oral daily  dextrose 5%. 1000 milliLiter(s) IV Continuous <Continuous>  ferrous    sulfate 325 milliGRAM(s) Oral daily  sodium chloride 0.9%. 1000 milliLiter(s) IV Continuous <Continuous>      08-27 @ 07:01  -  08-27 @ 19:34  --------------------------------------------------------  IN:    sodium chloride 0.9%.: 75 mL  Total IN: 75 mL    OUT:    Accordian: 60 mL    Accordian: 20 mL    Bulb: 10 mL  Total OUT: 90 mL    Total NET: -15 mL        Weight (kg): 74.8 (08-27 @ 12:35)  08-27    142  |  107  |  31<H>  ----------------------------<  148<H>  4.6   |  21<L>  |  2.01<H>    Ca    7.9<L>      27 Aug 2019 18:19      [ ] Peters catheter, indication: urine output monitoring in critically ill patient    HEMATOLOGIC  [X ] VTE Prophylaxis: Venodynes                          8.4    6.3   )-----------( 117      ( 27 Aug 2019 18:19 )             25.2       Transfusion: [X ] 1 PRBC	[ ] Platelets	[ ] FFP	[ ] Cryoprecipitate      INFECTIOUS DISEASES  ceFAZolin   IVPB 2000 milliGRAM(s) IV Intermittent every 8 hours  ceFAZolin   IVPB 2000 milliGRAM(s) IV Intermittent once    RECENT CULTURES: None      ENDOCRINE  atorvastatin 10 milliGRAM(s) Oral at bedtime  dextrose 40% Gel 15 Gram(s) Oral once PRN  dextrose 50% Injectable 12.5 Gram(s) IV Push once  dextrose 50% Injectable 25 Gram(s) IV Push once  dextrose 50% Injectable 25 Gram(s) IV Push once  finasteride 5 milliGRAM(s) Oral daily  glucagon  Injectable 1 milliGRAM(s) IntraMuscular once PRN  insulin glargine Injectable (LANTUS) 20 Unit(s) SubCutaneous at bedtime  insulin lispro (HumaLOG) corrective regimen sliding scale   SubCutaneous three times a day before meals  insulin lispro (HumaLOG) corrective regimen sliding scale   SubCutaneous at bedtime  insulin lispro Injectable (HumaLOG) 5 Unit(s) SubCutaneous three times a day before meals    CAPILLARY BLOOD GLUCOSE      POCT Blood Glucose.: 185 mg/dL (27 Aug 2019 07:03)      PATIENT CARE ACCESS DEVICES:  [ X] Peripheral IV  [ ] Central Venous Line	[ ] R	[ ] L	[ ] IJ	[ ] Fem	[ ] SC	Placed:   [ ] Arterial Line		[ ] R	[ ] L	[ ] Fem	[ ] Rad	[ ] Ax	Placed:   [ ] PICC:					[ ] Mediport  [ ] Urinary Catheter, Date Placed:   [x] Necessity of urinary, arterial, and venous catheters discussed    OTHER MEDICATIONS: naloxone Injectable 0.1 milliGRAM(s) IV Push every 3 minutes PRN      IMAGING STUDIES: None

## 2019-08-27 NOTE — PRE-ANESTHESIA EVALUATION ADULT - NSANTHPMHFT_GEN_ALL_CORE
PMH CAD s/p CABG X 4 2015, CHF - no recent events, DM2, BPH. Prior admission with CHF exacerbation and pulmonary edema - diuresed.    Cleared by cardiologist and PMD

## 2019-08-27 NOTE — PROCEDURE NOTE - ADDITIONAL PROCEDURE DETAILS
18f coude attempted, pt seemed to have a stricture  14f coude then successfully placed using sterile technique. clear urine drained  keep per primary team

## 2019-08-27 NOTE — CHART NOTE - NSCHARTNOTEFT_GEN_A_CORE
Patient Resting without complaints.  No Chest Pain, SOB, N/V.  Pre op s/sx: BLE Radiculopathy R>L  ; Post op, patient reports: Decreased Radiculopathy BLE and feeling much better.  T(C): 36.2 (08-27-19 @ 17:40), Max: 37 (08-27-19 @ 07:10)  HR: 107 (08-27-19 @ 19:00) (101 - 110)  BP: 110/71 (08-27-19 @ 19:00) (106/55 - 158/97)  RR: 16 (08-27-19 @ 19:00) (16 - 18)  SpO2: 98% (08-27-19 @ 19:00) (95% - 100%)  Wt(kg): --  Exam:   Alert/Oriented, No Acute Distress  Cards: +S1/S2, RRR  Pulm: CTAB           Dressing: [ ] clean/dry/intact  [ ] Other:           Drains: : 2x HV (Sewn) and 1x ELBERT (Sewn)         Sensation: [x] intact to light touch          Motor exam: [x ]                     [x] Lower extremity           PF          DF         EHL       FHL                                                                                   R        5/5        5/5        5/5       5/5                                              L         5/5        5/5        5/5       5/5                                                                  Calves Soft/Non-tender bilaterally           [x] warm well perfused; capillary refill <3 seconds              LABS:                        8.4    6.3   )-----------( 117      ( 27 Aug 2019 18:19 )             25.2     08-27    142  |  107  |  31<H>  ----------------------------<  148<H>  4.6   |  21<L>  |  2.01<H>    Ca    7.9<L>      27 Aug 2019 18:19          A/P :  68y Male s/p T11-L4 Lami/PSF revision with addition of L4-S1 Lami/revision, Bone graft  -    Pain control  -    F/U PACU and AM Labs  -    F/U SICU Consult, team aware  -    F/U Endocrine Consult tomorrow, team unaware  -    F/U Medicine Consult, team aware  -    F/U Acute Pain Service Consult  -    F/U LSO Brace, team aware  -    DVT ppx: SCDs       -    Physical Therapy  -    Weight bearing status: WBAT [x]           Sohail Roy  Team Pager #[phone] Patient Resting without complaints.  No Chest Pain, SOB, N/V.  Pre op s/sx: BLE Radiculopathy R>L  ; Post op, patient reports: Decreased Radiculopathy BLE and feeling much better.  T(C): 36.2 (19 @ 17:40), Max: 37 (19 @ 07:10)  HR: 107 (19 @ 19:00) (101 - 110)  BP: 110/71 (19 @ 19:00) (106/55 - 158/97)  RR: 16 (19 @ 19:00) (16 - 18)  SpO2: 98% (19 @ 19:00) (95% - 100%)  Wt(kg): --  Exam:   Alert/Oriented, No Acute Distress  Cards: +S1/S2, RRR  Pulm: CTAB           Dressin inch Aquacel C/D/I, Gauze and Tegaderm on drain sites x 3 C/D/I both applied by Plastic Sx team.         Drains: : 2x HV (Sewn) and 1x ELBERT (Sewn)         Sensation: [x] intact to light touch          Motor exam: [x ]                     [x] Lower extremity           PF          DF         EHL       FHL                                                                                   R        5/5        5/5        5/5       5/5                                              L         5/5        5/5        5/5       5/5                                                                  Calves Soft/Non-tender bilaterally           [x] warm well perfused; capillary refill <3 seconds              LABS:                        8.4    6.3   )-----------( 117      ( 27 Aug 2019 18:19 )             25.2         142  |  107  |  31<H>  ----------------------------<  148<H>  4.6   |  21<L>  |  2.01<H>    Ca    7.9<L>      27 Aug 2019 18:19          A/P :  68y Male s/p T11-L4 Lami/PSF revision with addition of L4-S1 Lami/revision, Bone graft  -    Pain control  -    F/U PACU and AM Labs  -    F/U SICU Consult, team aware  -    F/U Endocrine Consult tomorrow, team unaware  -    F/U Medicine Consult, team aware  -    F/U Acute Pain Service Consult  -    F/U LSO Brace, team aware  -    DVT ppx: SCDs       -    Physical Therapy  -    Weight bearing status: WBAT [x]           Sohail Roy  Team Pager #[phone]

## 2019-08-27 NOTE — PROCEDURE NOTE - NSPROCDETAILS_GEN_ALL_CORE
prior to placement, an active physician order for the placement of a urinary catheter was verified/kit not available for this size catheter/sterile technique, non-indwelling urinary device inserted/sterile technique, indwelling urinary device inserted

## 2019-08-27 NOTE — CONSULT NOTE ADULT - ATTENDING COMMENTS
Pt seen and examined. Chart reviewed. Resident note confirmed.  Pt is a 68 year old male with a medical history signficant for Spinal stenosis/radicular back pain/CAD/HTN/CHF and a surgical history signficant for T10-L4 laminectomy and fusion 5/2016. Pt returns to [facility] for continued low back pain that radiated to bilateral lower extremities with paresthesias. Today pt underwent T11-L4 revision laminectomy and L4-S1 posterior spinal fusion. EBL 1700ml/IVF 3.5L crystalloid, 1.5L 5% albumin, and 1U PRBC/UO 1400ml. SICU was consulted for Q1h neurological checks.     PMH/PSH/MEDS/ALL/SH/FH/ROS:  Unchanged from H&P above  Vitals/PE/Labs/Radiographic data:  Reviewed     A/P  Neuro:  S/p lumbar laminectomy and fusion  	Post op pain  	Continue PCA  	Continue IV tylenol  	Continue Q1 hour neuro checks		    CVS:	CAD/HTN/CHF  	Continue cardiac monitoring  	Post op EKG    Pulm:  	Atelectasis   	Continue ISP    GI:	No active issues   	NPO for now    :  	CKD 3  	Monitor I’s and O’s    Heme:   Acute blood loss anemia  	Monitor H/h  	transfuse for hct < 25    ID: 	Leukocytosis  	Perioperative Abx per ortho   	Culture for fever    PACU overnight

## 2019-08-27 NOTE — BRIEF OPERATIVE NOTE - COMMENTS
Plastic Surgery team performed Incision closure and placed 2 Hemovac Drains (Sewn) and 1 [employer] Drain (Sewn) both placed by Plastic Surgery Team.

## 2019-08-27 NOTE — PROCEDURE NOTE - NSURITECHNIQUE_GU_A_CORE
A sterile drape was used to cover all adjacent areas/The site was cleaned with soap/water and sterile solution (betadine)/The catheter was secured with a securement device (e.g. StatLock)/The collection bag is below the level of the patient and urinary bladder/All applicable medical record documentation is completed/Proper hand hygiene was performed/Sterile gloves were worn for the duration of the procedure/The catheter was appropriately lubricated

## 2019-08-28 DIAGNOSIS — E11.65 TYPE 2 DIABETES MELLITUS WITH HYPERGLYCEMIA: ICD-10-CM

## 2019-08-28 DIAGNOSIS — E78.5 HYPERLIPIDEMIA, UNSPECIFIED: ICD-10-CM

## 2019-08-28 DIAGNOSIS — I10 ESSENTIAL (PRIMARY) HYPERTENSION: ICD-10-CM

## 2019-08-28 LAB
ANION GAP SERPL CALC-SCNC: 12 MMOL/L — SIGNIFICANT CHANGE UP (ref 5–17)
ANION GAP SERPL CALC-SCNC: 13 MMOL/L — SIGNIFICANT CHANGE UP (ref 5–17)
APTT BLD: 30.6 SEC — SIGNIFICANT CHANGE UP (ref 27.5–36.3)
APTT BLD: 31.5 SEC — SIGNIFICANT CHANGE UP (ref 27.5–36.3)
BUN SERPL-MCNC: 26 MG/DL — HIGH (ref 7–23)
BUN SERPL-MCNC: 29 MG/DL — HIGH (ref 7–23)
CALCIUM SERPL-MCNC: 8.5 MG/DL — SIGNIFICANT CHANGE UP (ref 8.4–10.5)
CALCIUM SERPL-MCNC: 9 MG/DL — SIGNIFICANT CHANGE UP (ref 8.4–10.5)
CHLORIDE SERPL-SCNC: 100 MMOL/L — SIGNIFICANT CHANGE UP (ref 96–108)
CHLORIDE SERPL-SCNC: 104 MMOL/L — SIGNIFICANT CHANGE UP (ref 96–108)
CK MB BLD-MCNC: 0.4 % — SIGNIFICANT CHANGE UP (ref 0–3.5)
CK MB CFR SERPL CALC: 9.8 NG/ML — HIGH (ref 0–6.7)
CK SERPL-CCNC: 2565 U/L — HIGH (ref 30–200)
CO2 SERPL-SCNC: 22 MMOL/L — SIGNIFICANT CHANGE UP (ref 22–31)
CO2 SERPL-SCNC: 23 MMOL/L — SIGNIFICANT CHANGE UP (ref 22–31)
CREAT SERPL-MCNC: 1.98 MG/DL — HIGH (ref 0.5–1.3)
CREAT SERPL-MCNC: 2.14 MG/DL — HIGH (ref 0.5–1.3)
GAS PNL BLDA: SIGNIFICANT CHANGE UP
GAS PNL BLDV: SIGNIFICANT CHANGE UP
GLUCOSE BLDC GLUCOMTR-MCNC: 171 MG/DL — HIGH (ref 70–99)
GLUCOSE BLDC GLUCOMTR-MCNC: 186 MG/DL — HIGH (ref 70–99)
GLUCOSE BLDC GLUCOMTR-MCNC: 212 MG/DL — HIGH (ref 70–99)
GLUCOSE BLDC GLUCOMTR-MCNC: 214 MG/DL — HIGH (ref 70–99)
GLUCOSE BLDC GLUCOMTR-MCNC: 239 MG/DL — HIGH (ref 70–99)
GLUCOSE SERPL-MCNC: 162 MG/DL — HIGH (ref 70–99)
GLUCOSE SERPL-MCNC: 225 MG/DL — HIGH (ref 70–99)
HCT VFR BLD CALC: 27 % — LOW (ref 39–50)
HCT VFR BLD CALC: 28.3 % — LOW (ref 39–50)
HGB BLD-MCNC: 9.6 G/DL — LOW (ref 13–17)
HGB BLD-MCNC: 9.7 G/DL — LOW (ref 13–17)
INR BLD: 1.14 RATIO — SIGNIFICANT CHANGE UP (ref 0.88–1.16)
INR BLD: 1.27 RATIO — HIGH (ref 0.88–1.16)
MAGNESIUM SERPL-MCNC: 2 MG/DL — SIGNIFICANT CHANGE UP (ref 1.6–2.6)
MAGNESIUM SERPL-MCNC: 2 MG/DL — SIGNIFICANT CHANGE UP (ref 1.6–2.6)
MCHC RBC-ENTMCNC: 30 PG — SIGNIFICANT CHANGE UP (ref 27–34)
MCHC RBC-ENTMCNC: 30.8 PG — SIGNIFICANT CHANGE UP (ref 27–34)
MCHC RBC-ENTMCNC: 34.3 GM/DL — SIGNIFICANT CHANGE UP (ref 32–36)
MCHC RBC-ENTMCNC: 35.4 GM/DL — SIGNIFICANT CHANGE UP (ref 32–36)
MCV RBC AUTO: 87 FL — SIGNIFICANT CHANGE UP (ref 80–100)
MCV RBC AUTO: 87.4 FL — SIGNIFICANT CHANGE UP (ref 80–100)
PHOSPHATE SERPL-MCNC: 2.9 MG/DL — SIGNIFICANT CHANGE UP (ref 2.5–4.5)
PHOSPHATE SERPL-MCNC: 3.5 MG/DL — SIGNIFICANT CHANGE UP (ref 2.5–4.5)
PLATELET # BLD AUTO: 126 K/UL — LOW (ref 150–400)
PLATELET # BLD AUTO: 135 K/UL — LOW (ref 150–400)
POTASSIUM SERPL-MCNC: 4.6 MMOL/L — SIGNIFICANT CHANGE UP (ref 3.5–5.3)
POTASSIUM SERPL-MCNC: 5.2 MMOL/L — SIGNIFICANT CHANGE UP (ref 3.5–5.3)
POTASSIUM SERPL-SCNC: 4.6 MMOL/L — SIGNIFICANT CHANGE UP (ref 3.5–5.3)
POTASSIUM SERPL-SCNC: 5.2 MMOL/L — SIGNIFICANT CHANGE UP (ref 3.5–5.3)
PROTHROM AB SERPL-ACNC: 13.1 SEC — HIGH (ref 10–12.9)
PROTHROM AB SERPL-ACNC: 14.7 SEC — HIGH (ref 10–12.9)
RBC # BLD: 3.11 M/UL — LOW (ref 4.2–5.8)
RBC # BLD: 3.24 M/UL — LOW (ref 4.2–5.8)
RBC # FLD: 12 % — SIGNIFICANT CHANGE UP (ref 10.3–14.5)
RBC # FLD: 12.1 % — SIGNIFICANT CHANGE UP (ref 10.3–14.5)
SODIUM SERPL-SCNC: 135 MMOL/L — SIGNIFICANT CHANGE UP (ref 135–145)
SODIUM SERPL-SCNC: 139 MMOL/L — SIGNIFICANT CHANGE UP (ref 135–145)
TROPONIN T, HIGH SENSITIVITY RESULT: 122 NG/L — HIGH (ref 0–51)
WBC # BLD: 14.5 K/UL — HIGH (ref 3.8–10.5)
WBC # BLD: 9 K/UL — SIGNIFICANT CHANGE UP (ref 3.8–10.5)
WBC # FLD AUTO: 14.5 K/UL — HIGH (ref 3.8–10.5)
WBC # FLD AUTO: 9 K/UL — SIGNIFICANT CHANGE UP (ref 3.8–10.5)

## 2019-08-28 PROCEDURE — 93010 ELECTROCARDIOGRAM REPORT: CPT

## 2019-08-28 PROCEDURE — 93010 ELECTROCARDIOGRAM REPORT: CPT | Mod: 76,77

## 2019-08-28 PROCEDURE — 74018 RADEX ABDOMEN 1 VIEW: CPT | Mod: 26

## 2019-08-28 PROCEDURE — 99223 1ST HOSP IP/OBS HIGH 75: CPT

## 2019-08-28 PROCEDURE — 99222 1ST HOSP IP/OBS MODERATE 55: CPT | Mod: GC

## 2019-08-28 RX ORDER — FUROSEMIDE 40 MG
20 TABLET ORAL EVERY 24 HOURS
Refills: 0 | Status: DISCONTINUED | OUTPATIENT
Start: 2019-08-29 | End: 2019-09-03

## 2019-08-28 RX ORDER — CYCLOBENZAPRINE HYDROCHLORIDE 10 MG/1
5 TABLET, FILM COATED ORAL THREE TIMES A DAY
Refills: 0 | Status: DISCONTINUED | OUTPATIENT
Start: 2019-08-28 | End: 2019-09-03

## 2019-08-28 RX ORDER — LOSARTAN POTASSIUM 100 MG/1
25 TABLET, FILM COATED ORAL DAILY
Refills: 0 | Status: DISCONTINUED | OUTPATIENT
Start: 2019-08-29 | End: 2019-09-03

## 2019-08-28 RX ORDER — DOCUSATE SODIUM 100 MG
100 CAPSULE ORAL THREE TIMES A DAY
Refills: 0 | Status: DISCONTINUED | OUTPATIENT
Start: 2019-08-28 | End: 2019-09-03

## 2019-08-28 RX ORDER — CEFAZOLIN SODIUM 1 G
2000 VIAL (EA) INJECTION ONCE
Refills: 0 | Status: COMPLETED | OUTPATIENT
Start: 2019-08-28 | End: 2019-08-28

## 2019-08-28 RX ORDER — CEFAZOLIN SODIUM 1 G
2000 VIAL (EA) INJECTION EVERY 8 HOURS
Refills: 0 | Status: DISCONTINUED | OUTPATIENT
Start: 2019-08-28 | End: 2019-09-02

## 2019-08-28 RX ORDER — CALCIUM GLUCONATE 100 MG/ML
2 VIAL (ML) INTRAVENOUS ONCE
Refills: 0 | Status: COMPLETED | OUTPATIENT
Start: 2019-08-28 | End: 2019-08-28

## 2019-08-28 RX ORDER — INSULIN LISPRO 100/ML
7 VIAL (ML) SUBCUTANEOUS
Refills: 0 | Status: DISCONTINUED | OUTPATIENT
Start: 2019-08-28 | End: 2019-08-29

## 2019-08-28 RX ORDER — CEFAZOLIN SODIUM 1 G
2000 VIAL (EA) INJECTION EVERY 8 HOURS
Refills: 0 | Status: DISCONTINUED | OUTPATIENT
Start: 2019-08-28 | End: 2019-08-28

## 2019-08-28 RX ORDER — INSULIN GLARGINE 100 [IU]/ML
24 INJECTION, SOLUTION SUBCUTANEOUS AT BEDTIME
Refills: 0 | Status: DISCONTINUED | OUTPATIENT
Start: 2019-08-28 | End: 2019-08-29

## 2019-08-28 RX ORDER — CHLORHEXIDINE GLUCONATE 213 G/1000ML
1 SOLUTION TOPICAL DAILY
Refills: 0 | Status: DISCONTINUED | OUTPATIENT
Start: 2019-08-28 | End: 2019-08-28

## 2019-08-28 RX ORDER — SENNA PLUS 8.6 MG/1
2 TABLET ORAL AT BEDTIME
Refills: 0 | Status: DISCONTINUED | OUTPATIENT
Start: 2019-08-28 | End: 2019-09-03

## 2019-08-28 RX ORDER — CEFAZOLIN SODIUM 1 G
VIAL (EA) INJECTION
Refills: 0 | Status: DISCONTINUED | OUTPATIENT
Start: 2019-08-28 | End: 2019-08-28

## 2019-08-28 RX ADMIN — HYDROMORPHONE HYDROCHLORIDE 30 MILLILITER(S): 2 INJECTION INTRAMUSCULAR; INTRAVENOUS; SUBCUTANEOUS at 08:26

## 2019-08-28 RX ADMIN — SODIUM CHLORIDE 75 MILLILITER(S): 9 INJECTION INTRAMUSCULAR; INTRAVENOUS; SUBCUTANEOUS at 07:02

## 2019-08-28 RX ADMIN — TAMSULOSIN HYDROCHLORIDE 0.4 MILLIGRAM(S): 0.4 CAPSULE ORAL at 17:24

## 2019-08-28 RX ADMIN — CHLORHEXIDINE GLUCONATE 1 APPLICATION(S): 213 SOLUTION TOPICAL at 08:00

## 2019-08-28 RX ADMIN — Medication 2: at 06:53

## 2019-08-28 RX ADMIN — LOSARTAN POTASSIUM 25 MILLIGRAM(S): 100 TABLET, FILM COATED ORAL at 05:46

## 2019-08-28 RX ADMIN — Medication 200 GRAM(S): at 05:02

## 2019-08-28 RX ADMIN — Medication 100 MILLIGRAM(S): at 05:46

## 2019-08-28 RX ADMIN — ATORVASTATIN CALCIUM 10 MILLIGRAM(S): 80 TABLET, FILM COATED ORAL at 23:39

## 2019-08-28 RX ADMIN — PANTOPRAZOLE SODIUM 40 MILLIGRAM(S): 20 TABLET, DELAYED RELEASE ORAL at 06:52

## 2019-08-28 RX ADMIN — Medication 1000 MILLIGRAM(S): at 06:30

## 2019-08-28 RX ADMIN — Medication 7 UNIT(S): at 17:54

## 2019-08-28 RX ADMIN — Medication 2: at 17:54

## 2019-08-28 RX ADMIN — Medication 100 MILLIGRAM(S): at 23:42

## 2019-08-28 RX ADMIN — Medication 2: at 12:51

## 2019-08-28 RX ADMIN — Medication 325 MILLIGRAM(S): at 12:51

## 2019-08-28 RX ADMIN — Medication 400 MILLIGRAM(S): at 06:20

## 2019-08-28 RX ADMIN — Medication 100 MILLIGRAM(S): at 14:05

## 2019-08-28 RX ADMIN — Medication 100 MILLIGRAM(S): at 23:40

## 2019-08-28 RX ADMIN — Medication 5 UNIT(S): at 12:52

## 2019-08-28 RX ADMIN — HYDROMORPHONE HYDROCHLORIDE 30 MILLILITER(S): 2 INJECTION INTRAMUSCULAR; INTRAVENOUS; SUBCUTANEOUS at 07:37

## 2019-08-28 RX ADMIN — HYDROMORPHONE HYDROCHLORIDE 30 MILLILITER(S): 2 INJECTION INTRAMUSCULAR; INTRAVENOUS; SUBCUTANEOUS at 19:18

## 2019-08-28 RX ADMIN — TAMSULOSIN HYDROCHLORIDE 0.4 MILLIGRAM(S): 0.4 CAPSULE ORAL at 05:46

## 2019-08-28 RX ADMIN — Medication 100 MILLIGRAM(S): at 13:00

## 2019-08-28 RX ADMIN — HYDROMORPHONE HYDROCHLORIDE 30 MILLILITER(S): 2 INJECTION INTRAMUSCULAR; INTRAVENOUS; SUBCUTANEOUS at 21:30

## 2019-08-28 RX ADMIN — Medication 2000 UNIT(S): at 12:51

## 2019-08-28 RX ADMIN — FINASTERIDE 5 MILLIGRAM(S): 5 TABLET, FILM COATED ORAL at 12:51

## 2019-08-28 RX ADMIN — CYCLOBENZAPRINE HYDROCHLORIDE 5 MILLIGRAM(S): 10 TABLET, FILM COATED ORAL at 21:15

## 2019-08-28 RX ADMIN — Medication 5 UNIT(S): at 08:51

## 2019-08-28 RX ADMIN — Medication 100 MILLIGRAM(S): at 17:24

## 2019-08-28 RX ADMIN — Medication 100 MILLIGRAM(S): at 05:45

## 2019-08-28 RX ADMIN — SENNA PLUS 2 TABLET(S): 8.6 TABLET ORAL at 23:40

## 2019-08-28 NOTE — PROGRESS NOTE ADULT - ASSESSMENT
68M POD1 from T11-S1 Lami/Fusion and back wound closure. Recovering appropriately in PACU, Drains and dressing with appropriate output and appearance    - Continue to monitor drain outputs and back dressing  - Care per primary, Orthopedic surgery    Plastic surgery  [phone]

## 2019-08-28 NOTE — PROGRESS NOTE ADULT - ASSESSMENT
ASSESSMENT:  BRITANY JORGE is a 68y Male with history of spinal stenosis s/p T11-L4 revision laminectomy, L4-S1 posterior spinal fusion, in stable condition overnight postoperatively. SICU was consulted for Q1h neurological checks     PLAN:    NEURO:  - Q1h Neurological checks   - Pain control with IV tylenol, dilaudid PCA    RESPIRATORY:   - Monitor respiratory status  - OOB, encourage use of Incentive spirometer    CARDIOVASCULAR:  - Monitor vitals per routine   - resume home medications    GI/NUTRITION:  - Diet advanced, monitor tolerance    GENITOURINARY/RENAL:  - Monitor urine output  - Trend electrolytes, replete as needed     HEMATOLOGIC:  - DVT Chemoppx held until 24h postoperatively  - Venodynes in place  Significant intraop blood loss, 1prbc given.  - Monitor H/H, replete as needed     INFECTIOUS DISEASE:  - Monitor for fevers  - Monitor WBC     ENDOCRINE:  - FS/ISS   - Long acting insulin started    Cliff Hermosillo, PGY 2  Pager [phone]

## 2019-08-28 NOTE — CONSULT NOTE ADULT - SUBJECTIVE AND OBJECTIVE BOX
HPI:  69 yo Faroese speaking male from [facility], PMH CAD s/p CABG X 4 2015, CHF - no recent events, DM2, BPH s/p TURP 2017, hospitalized in 3/2019 with UTI 2/2 urinary retention - following up with urology, spinal stenosis s/p T10-L4 laminectomy and fusion 5/2016, chronic L2 fracture, loosening screws. Pt. still having low back pain that radiates to bilateral lower extremities R>L, has been treated with PT, NSAID's, APRYL without much relief - pt. presents to PST today for scheduled T10-Pelvis Posterior Revision Laminectomy and Spinal Fusion on 8/27/19. Pt. with SAINI - which is unchanged for many years, denies recent fever, chills, chest pain, SOB, changes in bowel/urinary habits.  Obtained last EKG and Echo from cardiologist office - in chart (EF 50-54%)  Pt. will continue on aspirin during reynaldo-op period, pt. will see PCP pre-op (13 Aug 2019 16:11)      PAST MEDICAL & SURGICAL HISTORY:  ETOH abuse: last drink 2015  Chronic low back pain  Lumbar compression fracture: chronic L2 fracture  Acute UTI: 3/2019 2/2 urinary retention 2/2 BPH  LBBB (left bundle branch block): on EKG  Lumbar disc disease  Transient cerebral ischemia, unspecified type: 10/2015  Coronary artery disease involving native coronary artery of native heart without angina pectoris  Dyslipidemia  Benign prostatic hypertrophy  Hypertension  Type 2 diabetes mellitus  S/P TURP: 2017  S/P lumbar laminectomy: 5/2016  Status post coronary artery bypass graft: x4  10/8/2015      FAMILY HISTORY:      Social History:    Outpatient Medications:    MEDICATIONS  (STANDING):  atorvastatin 10 milliGRAM(s) Oral at bedtime  ceFAZolin   IVPB 2000 milliGRAM(s) IV Intermittent every 8 hours  ceFAZolin   IVPB      chlorhexidine 2% Cloths 1 Application(s) Topical daily  cholecalciferol 2000 Unit(s) Oral daily  docusate sodium 100 milliGRAM(s) Oral three times a day  ferrous    sulfate 325 milliGRAM(s) Oral daily  finasteride 5 milliGRAM(s) Oral daily  furosemide    Tablet 20 milliGRAM(s) Oral at bedtime  HYDROmorphone PCA (1 mG/mL) 30 milliLiter(s) PCA Continuous PCA Continuous  insulin glargine Injectable (LANTUS) 20 Unit(s) SubCutaneous at bedtime  insulin lispro (HumaLOG) corrective regimen sliding scale   SubCutaneous three times a day before meals  insulin lispro (HumaLOG) corrective regimen sliding scale   SubCutaneous at bedtime  insulin lispro Injectable (HumaLOG) 5 Unit(s) SubCutaneous three times a day before meals  losartan 25 milliGRAM(s) Oral daily  pantoprazole    Tablet 40 milliGRAM(s) Oral before breakfast  pregabalin 100 milliGRAM(s) Oral two times a day  senna 2 Tablet(s) Oral at bedtime  tamsulosin 0.4 milliGRAM(s) Oral two times a day    MEDICATIONS  (PRN):  cyclobenzaprine 5 milliGRAM(s) Oral three times a day PRN Muscle Spasm  naloxone Injectable 0.1 milliGRAM(s) IV Push every 3 minutes PRN For ANY of the following changes in patient status:  A. RR LESS THAN 10 breaths per minute, B. Oxygen saturation LESS THAN 90%, C. Sedation score of 6  ondansetron Injectable 4 milliGRAM(s) IV Push every 6 hours PRN Nausea      Allergies    No Known Allergies    Intolerances      Review of Systems:  Constitutional: No fever  Eyes: No blurry vision  Neuro: No tremors  HEENT: No pain  Cardiovascular: No chest pain, palpitations  Respiratory: No SOB, no cough  GI: No nausea, vomiting, abdominal pain  : No dysuria  Skin: no rash  Psych: no depression  Endocrine: no polyuria, polydipsia  Hem/lymph: no swelling  Osteoporosis: no fractures    ALL OTHER SYSTEMS REVIEWED AND NEGATIVE    UNABLE TO OBTAIN    PHYSICAL EXAM:  VITALS: T(C): 36.6 (08-28-19 @ 11:00)  T(F): 97.9 (08-28-19 @ 11:00), Max: 99.6 (08-28-19 @ 08:00)  HR: 112 (08-28-19 @ 13:00) (90 - 128)  BP: 173/95 (08-28-19 @ 13:00) (106/55 - 176/93)  RR:  (14 - 34)  SpO2:  (91% - 100%)  Wt(kg): --  GENERAL: NAD, well-groomed, well-developed  EYES: No proptosis, no lid lag, anicteric  HEENT:  Atraumatic, Normocephalic, moist mucous membranes  THYROID: Normal size, no palpable nodules  RESPIRATORY: Clear to auscultation bilaterally; No rales, rhonchi, wheezing, or rubs  CARDIOVASCULAR: Regular rate and rhythm; No murmurs; no peripheral edema  GI: Soft, nontender, non distended, normal bowel sounds  SKIN: Dry, intact, No rashes or lesions  MUSCULOSKELETAL: Full range of motion, normal strength  NEURO: sensation intact, extraocular movements intact, no tremor, normal reflexes  PSYCH: Alert and oriented x 3, normal affect, normal mood  CUSHING'S SIGNS: no striae    POCT Blood Glucose.: 239 mg/dL (08-28-19 @ 12:46)  POCT Blood Glucose.: 186 mg/dL (08-28-19 @ 08:50)  POCT Blood Glucose.: 212 mg/dL (08-28-19 @ 06:29)  POCT Blood Glucose.: 202 mg/dL (08-27-19 @ 22:19)  POCT Blood Glucose.: 185 mg/dL (08-27-19 @ 07:03)                            9.7    9.0   )-----------( 126      ( 28 Aug 2019 02:52 )             28.3       08-28    139  |  104  |  29<H>  ----------------------------<  225<H>  5.2   |  22  |  1.98<H>    EGFR if : 39<L>  EGFR if non : 34<L>    Ca    8.5      08-28  Mg     2.0     08-28  Phos  3.5     08-28        Thyroid Function Tests:      Hemoglobin A1C, Whole Blood: 9.4 % <H> [4.0 - 5.6] (08-14-19 @ 00:28)          Radiology: HPI:  69 yo Kazakh speaking male from [facility], PMH CAD s/p CABG X 4 2015, CHF - no recent events, DM2, BPH s/p TURP 2017, hospitalized in 3/2019 with UTI 2/2 urinary retention - following up with urology, spinal stenosis s/p T10-L4 laminectomy and fusion 5/2016, chronic L2 fracture, loosening screws. Pt. still having low back pain that radiates to bilateral lower extremities R>L, has been treated with PT, NSAID's, APRYL without much relief - pt. presents to PST today for scheduled T10-Pelvis Posterior Revision Laminectomy and Spinal Fusion on 8/27/19. Pt. with SAINI - which is unchanged for many years, denies recent fever, chills, chest pain, SOB, changes in bowel/urinary habits.    Endocrine History  Used  [id number]  68 yr M with spinal stenosis s/p T10-L4 laminectomy and fusion (2016), CHF, DM2 c/b CAD s/p CABG X 4 and CKD III here s/p T10-pelvis revision laminectomy and spinal fusion (8/27). Endocrine consulted for hyperglycemia in 200s and uncontrolled DM2 A1C 9.4. Patient follows with a private endocrinologist but he cannot recall their name. He was diagnosed with T2DM at the age of 51. His mother and 4 sisters all have DM. Patient has neuropathy in his feet (may be secondary to spinal stenosis). He has never been hospitalized for DKA. Patient takes Basaglar 40 Units at night and Humalog 10 Units before every meal. He tries to moderate his intake of carbs but does have a  significant amount of tortilla, rice and bread in his diet. Avoids soda and juice. His AM glucose levels are 110-130, prelunch 150s and predinner can reach 200s. Has rare hypoglycemia.     PAST MEDICAL & SURGICAL HISTORY:  ETOH abuse: last drink 2015  Chronic low back pain  Lumbar compression fracture: chronic L2 fracture  Acute UTI: 3/2019 2/2 urinary retention 2/2 BPH  LBBB (left bundle branch block): on EKG  Lumbar disc disease  Transient cerebral ischemia, unspecified type: 10/2015  Coronary artery disease involving native coronary artery of native heart without angina pectoris  Dyslipidemia  Benign prostatic hypertrophy  Hypertension  Type 2 diabetes mellitus  S/P TURP: 2017  S/P lumbar laminectomy: 5/2016  Status post coronary artery bypass graft: x4  10/8/2015      FAMILY HISTORY: Mother, 4 sisters: T2DM      Social History:nonsmoker, nondrinker    Outpatient Medications:  · 	HumaLOG 100 units/mL subcutaneous solution: Last Dose Taken:  , 10 unit(s) subcutaneous 3 times a day (before meals)  · 	aspirin 81 mg oral delayed release tablet: Last Dose Taken:  , 1 tab(s) orally once a day (at bedtime)  · 	tamsulosin 0.4 mg oral capsule: Last Dose Taken:  , 1 cap(s) orally 2 times a day  · 	traMADol 50 mg oral tablet: 1 tab(s) orally 2 times a day, As Needed  · 	Lyrica 100 mg oral capsule: 1 cap(s) orally 2 times a day  · 	pantoprazole 40 mg oral delayed release tablet: 1 tab(s) orally once a day  · 	Vitamin D3 2000 intl units oral tablet: 1 tab(s) orally once a day  · 	oxycodone-acetaminophen 5 mg-325 mg oral tablet: 1 tab(s) orally 3 times a day, As Needed  · 	losartan 25 mg oral tablet: 1 tab(s) orally once a day  · 	atorvastatin 10 mg oral tablet: Last Dose Taken:  , 1 tab(s) orally once a day (at bedtime)  · 	ferrous sulfate 325 mg (65 mg elemental iron) oral tablet: Last Dose Taken:  , 1 tab(s) orally once a day  · 	finasteride 5 mg oral tablet: 1 tab(s) orally once a day (at bedtime)  · 	Basaglar KwikPen 100 units/mL subcutaneous solution: Last Dose Taken:  , 40 unit(s) subcutaneous once a day (at bedtime).   · 	furosemide 20 mg oral tablet: Last Dose Taken:  , 1 tab(s) orally once a day (at bedtime)        MEDICATIONS  (STANDING):  atorvastatin 10 milliGRAM(s) Oral at bedtime  ceFAZolin   IVPB 2000 milliGRAM(s) IV Intermittent every 8 hours  ceFAZolin   IVPB      chlorhexidine 2% Cloths 1 Application(s) Topical daily  cholecalciferol 2000 Unit(s) Oral daily  docusate sodium 100 milliGRAM(s) Oral three times a day  ferrous    sulfate 325 milliGRAM(s) Oral daily  finasteride 5 milliGRAM(s) Oral daily  furosemide    Tablet 20 milliGRAM(s) Oral at bedtime  HYDROmorphone PCA (1 mG/mL) 30 milliLiter(s) PCA Continuous PCA Continuous  insulin glargine Injectable (LANTUS) 20 Unit(s) SubCutaneous at bedtime  insulin lispro (HumaLOG) corrective regimen sliding scale   SubCutaneous three times a day before meals  insulin lispro (HumaLOG) corrective regimen sliding scale   SubCutaneous at bedtime  insulin lispro Injectable (HumaLOG) 5 Unit(s) SubCutaneous three times a day before meals  losartan 25 milliGRAM(s) Oral daily  pantoprazole    Tablet 40 milliGRAM(s) Oral before breakfast  pregabalin 100 milliGRAM(s) Oral two times a day  senna 2 Tablet(s) Oral at bedtime  tamsulosin 0.4 milliGRAM(s) Oral two times a day    MEDICATIONS  (PRN):  cyclobenzaprine 5 milliGRAM(s) Oral three times a day PRN Muscle Spasm  naloxone Injectable 0.1 milliGRAM(s) IV Push every 3 minutes PRN For ANY of the following changes in patient status:  A. RR LESS THAN 10 breaths per minute, B. Oxygen saturation LESS THAN 90%, C. Sedation score of 6  ondansetron Injectable 4 milliGRAM(s) IV Push every 6 hours PRN Nausea      Allergies    No Known Allergies    Intolerances      Review of Systems:  Constitutional: No fever  Eyes: No blurry vision  Neuro: No tremors  HEENT: No pain  Cardiovascular: No chest pain, palpitations  Respiratory: No SOB, no cough  GI: No nausea, vomiting, abdominal pain  : No dysuria  Skin: no rash  Psych: no depression  Endocrine: no polyuria, polydipsia  Hem/lymph: no swelling  Osteoporosis: no fractures    ALL OTHER SYSTEMS REVIEWED AND NEGATIVE      PHYSICAL EXAM:  VITALS: T(C): 36.6 (08-28-19 @ 11:00)  T(F): 97.9 (08-28-19 @ 11:00), Max: 99.6 (08-28-19 @ 08:00)  HR: 112 (08-28-19 @ 13:00) (90 - 128)  BP: 173/95 (08-28-19 @ 13:00) (106/55 - 176/93)  RR:  (14 - 34)  SpO2:  (91% - 100%)  Wt(kg): --  GENERAL: NAD, well-groomed, well-developed  EYES: No proptosis, no lid lag, anicteric  HEENT:  Atraumatic, Normocephalic, moist mucous membranes  RESPIRATORY: Clear to auscultation bilaterally; No rales, rhonchi, wheezing, or rubs  CARDIOVASCULAR: Regular rate and rhythm; No murmurs; no peripheral edema  GI: Soft, nontender, non distended, normal bowel sounds  PSYCH: Alert and oriented x 3, normal affect, normal mood  SKIN: No rashes or lesions      POCT Blood Glucose.: 239 mg/dL (08-28-19 @ 12:46)  POCT Blood Glucose.: 186 mg/dL (08-28-19 @ 08:50)  POCT Blood Glucose.: 212 mg/dL (08-28-19 @ 06:29)  POCT Blood Glucose.: 202 mg/dL (08-27-19 @ 22:19)  POCT Blood Glucose.: 185 mg/dL (08-27-19 @ 07:03)                            9.7    9.0   )-----------( 126      ( 28 Aug 2019 02:52 )             28.3       08-28    139  |  104  |  29<H>  ----------------------------<  225<H>  5.2   |  22  |  1.98<H>    EGFR if : 39<L>  EGFR if non : 34<L>    Ca    8.5      08-28  Mg     2.0     08-28  Phos  3.5     08-28          Hemoglobin A1C, Whole Blood: 9.4 % <H> [4.0 - 5.6] (08-14-19 @ 00:28)

## 2019-08-28 NOTE — PROGRESS NOTE ADULT - ASSESSMENT
A/P: 68M s/p T11-S1 Lami/Fusion POD 1  q1 Neuro Checks  PCA  Analgesia  FU Endo C/s  FU LSO Brace  FU Drain Op  Peters  Venodynes  WBAT  PT/OT  Encourage incentive spirometry  Will discuss with attending and advise if plan changes

## 2019-08-28 NOTE — CONSULT NOTE ADULT - ASSESSMENT
A/P: 68M s/p T11-S1 Lami/Fusion.    S/p T11-S1 Lami/Fusion:  Cont mx as per SICU  Ortho spine f/up noted.  Pain control through Dilaudid PCA    DM II:  Lantus/Humalog/FSSS  Endo eval noted.    HTN:  Cw lasix/Losartan  Will titrate BP meds as per SBP
ASSESSMENT:  BRITANY JORGE is a 68y Male with history of spinal stenosis s/p T11-L4 revision laminectomy, L4-S1 posterior spinal fusion, in PACU in stable condition postoperatively. SICU was consulted for Q1h neurological checks overnight    PLAN:    NEURO:  - Q1h Neurological checks   - Pain control with IV tylenol, dilaudid PCA    RESPIRATORY:   - Monitor respiratory status  - OOB, encourage use of Incentive spirometer    CARDIOVASCULAR:  - Monitor vitals per routine   - resume home medications    GI/NUTRITION:  - Diet advanced, monitor tolerance    GENITOURINARY/RENAL:  - Monitor urine output  - Trend electrolytes, replete as needed     HEMATOLOGIC:  - DVT Chemoppx held until 24h postoperatively  - Venodynes in place  Significant intraop blood loss, 1prbc given.  - Monitor H/H, replete as needed     INFECTIOUS DISEASE:  - Monitor for fevers  - Monitor WBC     ENDOCRINE:  - FS/ISS   - Long acting insulin started    Cliff Hermosillo, PGY 2  Pager [phone]
68 yr M with spinal stenosis s/p T10-L4 laminectomy and fusion (2016), CHF, DM2 c/b CAD s/p CABG X 4 and CKD III here s/p T10-pelvis revision laminectomy and spinal fusion (8/27).

## 2019-08-28 NOTE — CHART NOTE - NSCHARTNOTEFT_GEN_A_CORE
SICU TRANSFER NOTE    68 year old male s/p T11-L4 laminectomy revision and L4-S1 posterior spinal fusion on 8/27. Case was significant for an EBL of 1700 mL. He received 3.5 L of crystalloid, 1.5 L of 5% albumin, and 1 unit of PRBCs intra-operatively with a UOP of 1.4 L. Patient was extubated at the end of the case and transferred to PACU hemodynamically stable. SICU was consulted for q1hr neurological checks and accepted. Of note, patient has been tachycardic and hypertensive throughout the day today. ECG demonstrates sinus tachycardia with no concerning findings. Labs were also obtained. Patient's tachycardia and hypertension is likely secondary to back spasms. Patient states that he forgets to press his PCA and has not requested any antispasmodic. SICU TRANSFER NOTE:  68 year old male s/p T11-L4 laminectomy revision and L4-S1 posterior spinal fusion on 8/27. Case was significant for an EBL of 1700 mL. He received 3.5 L of crystalloid, 1.5 L of 5% albumin, and 1 unit of PRBCs intra-operatively with a UOP of 1.4 L. Patient was extubated at the end of the case and transferred to PACU hemodynamically stable. SICU was consulted for q1hr neurological checks and accepted. Of note, patient has been tachycardic and hypertensive throughout the day today. ECG demonstrates sinus tachycardia with no concerning findings. Labs were also obtained with no acute findings. Patient's tachycardia and hypertension is likely secondary to back spasms. Patient states that he forgets to press his PCA and has not requested any antispasmodic.     VITAL SIGNS.  T(C): 36.4 (28 Aug 2019 19:00), Max: 37.6 (28 Aug 2019 08:00)  T(F): 97.5 (28 Aug 2019 19:00), Max: 99.6 (28 Aug 2019 08:00)  HR: 108 (28 Aug 2019 20:00) (90 - 128)  BP: 147/78 (28 Aug 2019 20:00) (117/75 - 176/93)  BP(mean): 107 (28 Aug 2019 20:00) (93 - 127)  RR: 25 (28 Aug 2019 20:00) (14 - 36)  SpO2: 93% (28 Aug 2019 20:00) (91% - 100%)    PHYSICAL EXAMINATION:      LABS:  VBG - ( 28 Aug 2019 21:22 )  pH: 7.36  /  pCO2: 46    /  pO2: 29    / HCO3: 26    / Base Excess: 0.3   /  SaO2: 52   /   Lactate: 1.1 SICU TRANSFER NOTE:  68 year old male s/p T11-L4 laminectomy revision and L4-S1 posterior spinal fusion on 8/27. Case was significant for an EBL of 1700 mL. He received 3.5 L of crystalloid, 1.5 L of 5% albumin, and 1 unit of PRBCs intra-operatively with a UOP of 1.4 L. Patient was extubated at the end of the case and transferred to PACU hemodynamically stable. SICU was consulted for q1hr neurological checks and accepted. Of note, patient has been tachycardic and hypertensive throughout the day today. ECG demonstrates sinus tachycardia with no concerning findings. Labs were also obtained with no acute findings. Patient states that he has been having back spasms and forgets to press his PCA but otherwise feels well. Denies shortness of breath, chest pain, nausea/vomiting, weakness, dizziness, or headaches.    VITAL SIGNS.  T(C): 36.4 (28 Aug 2019 19:00), Max: 37.6 (28 Aug 2019 08:00)  T(F): 97.5 (28 Aug 2019 19:00), Max: 99.6 (28 Aug 2019 08:00)  HR: 108 (28 Aug 2019 20:00) (90 - 128)  BP: 147/78 (28 Aug 2019 20:00) (117/75 - 176/93)  BP(mean): 107 (28 Aug 2019 20:00) (93 - 127)  RR: 25 (28 Aug 2019 20:00) (14 - 36)  SpO2: 93% (28 Aug 2019 20:00) (91% - 100%)    PHYSICAL EXAMINATION:  Neuro - awake, alert, oriented x4, no acute distress  Resp - clear to auscultation bilaterally  CV - regular rhythm, sinus tachycardia  GI - soft, nontender, nondistended  Extremities - all four extremities are warm & pink with 2+ pulses and strength 5/5, known decreased sensation of RLE from knee to ankle    LABS:  VBG - ( 28 Aug 2019 21:22 )  pH: 7.36  /  pCO2: 46    /  pO2: 29    / HCO3: 26    / Base Excess: 0.3   /  SaO2: 52   /   Lactate: 1.1    ASSESSMENT:  68 year old male s/p T11-L4 laminectomy revision and L4-S1 posterior spinal fusion.    PLAN:  - Neuro checks every 4 hours. Exam has not changed from pre-op.  - Please encourage IS and deep breathing exercises. Out of bed to chair in the AM. WBAT with TLSO brace as per orthopedics.  - Please monitor vital signs. If patient has any worsening tachycardia, please obtain CTA chest to rule out PE. If no PE, would recommend starting metoprolol. Of note, patient was hypertensive as an outpatient with SBP in the 150s.  - No VTE prophylaxis as per orthopedic surgery. Would recommend starting SQH when cleared from surgical standpoint as patient is high risk for VTE.  - Loretta-operative Ancef.  - Lantus increased from 20 to 24 units at bedtime. Please follow up endocrinology recommendations daily for patient's IDDM.  - If any issues arise, please reconsult SICU at any time.    Sary Edmonds PA-C    SICU Resident Spectra: [id number]  SICU PA Spectra: [id number]

## 2019-08-28 NOTE — PROGRESS NOTE ADULT - SUBJECTIVE AND OBJECTIVE BOX
SICU CONSULT NOTE    HPI  BRITANY JORGE is a 68y Male with a history of spinal stenosis s/p T10-L4 laminectomy and fusion 5/2016, Pt. was still having low back pain that radiated to bilateral lower extremities R>L, also with right sided lower extremity paresthesias. Today pt underwent T11-L4 revision laminectomy, L4-S1 posterior spinal fusion, and SICU was consulted for Q1h neurological checks. Pt also has a PMH of CAD, CHF, HTN, DM, LBBB, BPH, and former EtOH abuse, and is s/p CABG x 4 (2015), TURP (2017), and lumbar laminectomy and fusion (2016).   Postoperatively patient seen and examined, in no acute distress. No acute events overnight. Continues to complain of lower back pain around incision site and persistent numbness of right shin/calf area, pain in right foot, which is unchanged from pre-op. Also endorses hunger and thirst, no other complaints at this time.    Surgery Information:  T11-L4 revision laminectomy, L4-S1 posterior spinal fusion. Plastics closure with placement of Hemovac x 2 and ELBERT drain x 1.  EBL: 1700mL	IV Fluids: 3.5L NS, 1.5L Albumin	Blood Products: 1 PRBC	Urine Output: 1400mL  Complications: None      PAST MEDICAL HISTORY: ETOH abuse  Acute CHF  LBBB (left bundle branch block)  Hypertension  Type 2 diabetes mellitus  Lumbar disc disease  Transient cerebral ischemia, unspecified type  Coronary artery disease involving native coronary artery of native heart without angina pectoris  Chronic low back pain  Lumbar compression fracture  Acute UTI  Dyslipidemia  Benign prostatic hypertrophy      PAST SURGICAL HISTORY: S/P TURP  S/P lumbar laminectomy  Status post coronary artery bypass graft x4  TURP      SOCIAL HISTORY:  Former EtOH abuse, last drink 2015    CODE STATUS:   Full Code    HOME MEDICATIONS:  aspirin 81 mg oral delayed release tablet: 1 tab(s) orally once a day (at bedtime) (27 Aug 2019 06:56)  atorvastatin 10 mg oral tablet: 1 tab(s) orally once a day (at bedtime) (27 Aug 2019 06:56)  Basaglar KwikPen 100 units/mL subcutaneous solution: 40 unit(s) subcutaneous once a day (at bedtime). Pt. will decrease to 32 units night before procedure (27 Aug 2019 06:56)  ferrous sulfate 325 mg (65 mg elemental iron) oral tablet: 1 tab(s) orally once a day (27 Aug 2019 06:56)  finasteride 5 mg oral tablet: 1 tab(s) orally once a day (at bedtime) (27 Aug 2019 06:56)  furosemide 20 mg oral tablet: 1 tab(s) orally once a day (at bedtime) (27 Aug 2019 06:56)  HumaLOG 100 units/mL subcutaneous solution: 10 unit(s) subcutaneous 3 times a day (before meals) (27 Aug 2019 06:56)  losartan 25 mg oral tablet: 1 tab(s) orally once a day (27 Aug 2019 06:56)  Lyrica 100 mg oral capsule: 1 cap(s) orally 2 times a day (27 Aug 2019 06:56)  oxycodone-acetaminophen 5 mg-325 mg oral tablet: 1 tab(s) orally 3 times a day, As Needed (27 Aug 2019 06:56)  pantoprazole 40 mg oral delayed release tablet: 1 tab(s) orally once a day (27 Aug 2019 06:56)  tamsulosin 0.4 mg oral capsule: 1 cap(s) orally 2 times a day (27 Aug 2019 06:56)  traMADol 50 mg oral tablet: 1 tab(s) orally 2 times a day, As Needed (27 Aug 2019 06:56)  Vitamin D3 2000 intl units oral tablet: 1 tab(s) orally once a day (27 Aug 2019 06:56)      ALLERGIES: No Known Allergies      VITAL SIGNS:  ICU Vital Signs Last 24 Hrs  T(C): 36.2 (27 Aug 2019 17:40), Max: 37 (27 Aug 2019 07:10)  T(F): 97.2 (27 Aug 2019 17:40), Max: 98.6 (27 Aug 2019 07:10)  HR: 107 (27 Aug 2019 19:00) (101 - 110)  BP: 110/71 (27 Aug 2019 19:00) (106/55 - 158/97)  BP(mean): 87 (27 Aug 2019 19:00) (70 - 88)  ABP: 139/64 (27 Aug 2019 19:00) (119/61 - 139/64)  ABP(mean): 87 (27 Aug 2019 19:00) (78 - 87)  RR: 16 (27 Aug 2019 19:00) (16 - 18)  SpO2: 98% (27 Aug 2019 19:00) (95% - 100%)      NEURO  Exam: GCS 15, AAOx3. Strength grossly intact all 4 extremities 5/5. Paresthesia with moderate numbness of RLE from knee to ankle, and pain described as shooting in dorsal foot. Sensation of LLE and b/l UE grossly intact.     HYDROmorphone  Injectable 0.5 milliGRAM(s) IV Push every 10 minutes PRN Moderate Pain (4 - 6)  HYDROmorphone PCA (1 mG/mL) 30 milliLiter(s) PCA Continuous PCA Continuous  ondansetron Injectable 4 milliGRAM(s) IV Push every 6 hours PRN Nausea  ondansetron Injectable 4 milliGRAM(s) IV Push once PRN Nausea and/or Vomiting  pregabalin 100 milliGRAM(s) Oral two times a day      RESPIRATORY    Exam: Nonlabored, CTABL, no wheezes, ronchi, or rales. Normal respiratory effort.     ABG - ( 27 Aug 2019 16:07 )  pH: 7.39  /  pCO2: 36    /  pO2: 240   / HCO3: 22    / Base Excess: -2.6  /  SaO2: 100     Lactate: x            CARDIOVASCULAR    Exam: Normotensive, RRR, no M/R/G   Cardiac Rhythm: NSR    furosemide    Tablet 20 milliGRAM(s) Oral at bedtime  losartan 25 milliGRAM(s) Oral daily  tamsulosin 0.4 milliGRAM(s) Oral two times a day      GI/NUTRITION  Exam: Abdomen soft, NT/ND, no rebound or guarding   Diet: Regular    pantoprazole    Tablet 40 milliGRAM(s) Oral before breakfast      GENITOURINARY/RENAL  cholecalciferol 2000 Unit(s) Oral daily  dextrose 5%. 1000 milliLiter(s) IV Continuous <Continuous>  ferrous    sulfate 325 milliGRAM(s) Oral daily  sodium chloride 0.9%. 1000 milliLiter(s) IV Continuous <Continuous>      08-27 @ 07:01  -  08-27 @ 19:34  --------------------------------------------------------  IN:    sodium chloride 0.9%.: 75 mL  Total IN: 75 mL    OUT:    Accordian: 60 mL    Accordian: 20 mL    Bulb: 10 mL  Total OUT: 90 mL    Total NET: -15 mL        Weight (kg): 74.8 (08-27 @ 12:35)  08-27    142  |  107  |  31<H>  ----------------------------<  148<H>  4.6   |  21<L>  |  2.01<H>    Ca    7.9<L>      27 Aug 2019 18:19      [ ] Peters catheter, indication: urine output monitoring in critically ill patient    HEMATOLOGIC  [X ] VTE Prophylaxis: Venodynes                          8.4    6.3   )-----------( 117      ( 27 Aug 2019 18:19 )             25.2       Transfusion: [X ] 1 PRBC	[ ] Platelets	[ ] FFP	[ ] Cryoprecipitate      INFECTIOUS DISEASES  ceFAZolin   IVPB 2000 milliGRAM(s) IV Intermittent every 8 hours  ceFAZolin   IVPB 2000 milliGRAM(s) IV Intermittent once    RECENT CULTURES: None      ENDOCRINE  atorvastatin 10 milliGRAM(s) Oral at bedtime  dextrose 40% Gel 15 Gram(s) Oral once PRN  dextrose 50% Injectable 12.5 Gram(s) IV Push once  dextrose 50% Injectable 25 Gram(s) IV Push once  dextrose 50% Injectable 25 Gram(s) IV Push once  finasteride 5 milliGRAM(s) Oral daily  glucagon  Injectable 1 milliGRAM(s) IntraMuscular once PRN  insulin glargine Injectable (LANTUS) 20 Unit(s) SubCutaneous at bedtime  insulin lispro (HumaLOG) corrective regimen sliding scale   SubCutaneous three times a day before meals  insulin lispro (HumaLOG) corrective regimen sliding scale   SubCutaneous at bedtime  insulin lispro Injectable (HumaLOG) 5 Unit(s) SubCutaneous three times a day before meals    CAPILLARY BLOOD GLUCOSE      POCT Blood Glucose.: 185 mg/dL (27 Aug 2019 07:03)      PATIENT CARE ACCESS DEVICES:  [ X] Peripheral IV  [ ] Central Venous Line	[ ] R	[ ] L	[ ] IJ	[ ] Fem	[ ] SC	Placed:   [ ] Arterial Line		[ ] R	[ ] L	[ ] Fem	[ ] Rad	[ ] Ax	Placed:   [ ] PICC:					[ ] Mediport  [ ] Urinary Catheter, Date Placed:   [x] Necessity of urinary, arterial, and venous catheters discussed    OTHER MEDICATIONS: naloxone Injectable 0.1 milliGRAM(s) IV Push every 3 minutes PRN      IMAGING STUDIES: None

## 2019-08-28 NOTE — CONSULT NOTE ADULT - PROBLEM SELECTOR RECOMMENDATION 9
-While inpatient, check FS AC and HS and keep on CHO diet  -Goal glucose 100-180  -Recommend increasing Lantus to 24 Units HS and Humalog to 7 U TIDAC plus low dose Humalog scale before meals and at bedtime  -Patient would benefit from nutrition consult and nursing teaching on proper insulin injection technique  -Discharge on basal/bolus (doses tbd prior to discharge)  -Patient can follow up with private endocrinologist or at [address] [phone]  Yudelka Lopez (pager [phone])  On evenings and weekends, please call [phone] or page endocrine fellow on call.   Please note that this patient may be followed by different provider tomorrow. If no answer, contact endocrine fellow on call.  Discussed with team -While inpatient, check FS AC and HS and keep on CHO diet  -Goal glucose 100-180  -Recommend increasing Lantus to 24 Units HS and Humalog to 7 U TIDAC plus low dose Humalog scale before meals and at bedtime  -If patient is made NPO, can give Lantus 20 Units tonight and keep on low scale q 4 hours  -Patient would benefit from nutrition consult and nursing teaching on proper insulin injection technique  -Discharge on basal/bolus (doses tbd prior to discharge)  -Patient can follow up with private endocrinologist or at [address] [phone]  Yudelka Lopez (pager [phone])  On evenings and weekends, please call [phone] or page endocrine fellow on call.   Please note that this patient may be followed by different provider tomorrow. If no answer, contact endocrine fellow on call.  Discussed with team

## 2019-08-28 NOTE — PROGRESS NOTE ADULT - SUBJECTIVE AND OBJECTIVE BOX
Patient seen and examined at bedside. Reports no acute complaints at this time. Pain is well controlled. No acute events overnight.    PHYSICAL EXAM:  Vital Signs Last 24 Hrs  T(C): 36.2 (28 Aug 2019 04:00), Max: 37 (27 Aug 2019 07:10)  T(F): 97.2 (28 Aug 2019 04:00), Max: 98.6 (27 Aug 2019 07:10)  HR: 108 (28 Aug 2019 05:00) (90 - 112)  BP: 117/75 (28 Aug 2019 05:00) (106/55 - 170/83)  BP(mean): 93 (28 Aug 2019 05:00) (70 - 119)  RR: 17 (28 Aug 2019 05:00) (16 - 18)  SpO2: 100% (28 Aug 2019 05:00) (93% - 100%)    Gen: NAD, AAOx3  Spine:  Dressing clean, dry and intact  Peters  ELBERT Drain Intact  HMV x2 intact         Sensation:  intact to light touch           Motor exam:              Bilateral Lower ext.     Hip Flx  Hip Ext    Quad   Hamstrg   TA       EHL      GS                                          R        5/5        5/5        5/5        5/5          5/5     5/5      5/5                                          L         5/5        5/5        5/5        5/5          5/5     5/5      5/5

## 2019-08-28 NOTE — PROGRESS NOTE ADULT - SUBJECTIVE AND OBJECTIVE BOX
Day __ of Anesthesia Pain Management Service    SUBJECTIVE: Patient is doing well with IV PCA    Pain Scale Score:	[X] Refer to charted pain scores    THERAPY:    [ ] IV PCA Morphine		[ ] 5 mg/mL	[ ] 1 mg/mL  [X] IV PCA Hydromorphone	[ ] 5 mg/mL	[X] 1 mg/mL  [ ] IV PCA Fentanyl		[ ] 50 micrograms/mL    Demand dose: 0.2 mg     Lockout: 6 minutes   Continuous Rate: 0 mg/hr  4 Hour Limit: 4 mg    MEDICATIONS  (STANDING):  atorvastatin 10 milliGRAM(s) Oral at bedtime  cholecalciferol 2000 Unit(s) Oral daily  dextrose 5%. 1000 milliLiter(s) (50 mL/Hr) IV Continuous <Continuous>  dextrose 50% Injectable 12.5 Gram(s) IV Push once  dextrose 50% Injectable 25 Gram(s) IV Push once  dextrose 50% Injectable 25 Gram(s) IV Push once  ferrous    sulfate 325 milliGRAM(s) Oral daily  finasteride 5 milliGRAM(s) Oral daily  furosemide    Tablet 20 milliGRAM(s) Oral at bedtime  HYDROmorphone PCA (1 mG/mL) 30 milliLiter(s) PCA Continuous PCA Continuous  insulin glargine Injectable (LANTUS) 20 Unit(s) SubCutaneous at bedtime  insulin lispro (HumaLOG) corrective regimen sliding scale   SubCutaneous three times a day before meals  insulin lispro (HumaLOG) corrective regimen sliding scale   SubCutaneous at bedtime  insulin lispro Injectable (HumaLOG) 5 Unit(s) SubCutaneous three times a day before meals  losartan 25 milliGRAM(s) Oral daily  pantoprazole    Tablet 40 milliGRAM(s) Oral before breakfast  pregabalin 100 milliGRAM(s) Oral two times a day  sodium chloride 0.9%. 1000 milliLiter(s) (75 mL/Hr) IV Continuous <Continuous>  tamsulosin 0.4 milliGRAM(s) Oral two times a day    MEDICATIONS  (PRN):  dextrose 40% Gel 15 Gram(s) Oral once PRN Blood Glucose LESS THAN 70 milliGRAM(s)/deciliter  glucagon  Injectable 1 milliGRAM(s) IntraMuscular once PRN Glucose LESS THAN 70 milligrams/deciliter  naloxone Injectable 0.1 milliGRAM(s) IV Push every 3 minutes PRN For ANY of the following changes in patient status:  A. RR LESS THAN 10 breaths per minute, B. Oxygen saturation LESS THAN 90%, C. Sedation score of 6  ondansetron Injectable 4 milliGRAM(s) IV Push every 6 hours PRN Nausea      OBJECTIVE:    Sedation Score:	[ X] Alert	[ ] Drowsy 	[ ] Arousable	[ ] Asleep	[ ] Unresponsive    Side Effects:	[X ] None	[ ] Nausea	[ ] Vomiting	[ ] Pruritus  		[ ] Other:    Vital Signs Last 24 Hrs  T(C): 37.6 (28 Aug 2019 08:00), Max: 37.6 (28 Aug 2019 08:00)  T(F): 99.6 (28 Aug 2019 08:00), Max: 99.6 (28 Aug 2019 08:00)  HR: 122 (28 Aug 2019 09:30) (90 - 128)  BP: 139/89 (28 Aug 2019 08:00) (106/55 - 170/83)  BP(mean): 109 (28 Aug 2019 08:00) (70 - 119)  RR: 20 (28 Aug 2019 09:30) (14 - 34)  SpO2: 94% (28 Aug 2019 09:30) (92% - 100%)    ASSESSMENT/ PLAN    Therapy to  be:               [X] Continued   [ ] Discontinued   [ ] Changed to PRN Analgesics    Documentation and Verification of current medications:   [X] Done	[ ] Not done, not eligible    Comments:

## 2019-08-28 NOTE — CONSULT NOTE ADULT - SUBJECTIVE AND OBJECTIVE BOX
Patient is a 68y old  Male who presents with a chief complaint of back pain (28 Aug 2019 14:15)      HPI:  67 yo Monegasque speaking male from [facility], PMH CAD s/p CABG X 4 2015, CHF - no recent events, DM2, BPH s/p TURP 2017, hospitalized in 3/2019 with UTI 2/2 urinary retention - following up with urology, spinal stenosis s/p T10-L4 laminectomy and fusion 5/2016, chronic L2 fracture, loosening screws. Pt. still having low back pain that radiates to bilateral lower extremities R>L, has been treated with PT, NSAID's, APRYL without much relief - pt. presents to PST today for scheduled T10-Pelvis Posterior Revision Laminectomy and Spinal Fusion on 8/27/19. Pt. with SAINI - which is unchanged for many years, denies recent fever, chills, chest pain, SOB, changes in bowel/urinary habits.  Echo from cardiologist office -  (EF 50-54%)        PAST MEDICAL & SURGICAL HISTORY:  ETOH abuse: last drink 2015  Chronic low back pain  Lumbar compression fracture: chronic L2 fracture  Acute UTI: 3/2019 2/2 urinary retention 2/2 BPH  LBBB (left bundle branch block): on EKG  Lumbar disc disease  Transient cerebral ischemia, unspecified type: 10/2015  Coronary artery disease involving native coronary artery of native heart without angina pectoris  Dyslipidemia  Benign prostatic hypertrophy  Hypertension  Type 2 diabetes mellitus  S/P TURP: 2017  S/P lumbar laminectomy: 5/2016  Status post coronary artery bypass graft: x4  10/8/2015      Review of Systems:   CONSTITUTIONAL: No fever,  or fatigue  RESPIRATORY: No cough, wheezing, chills or hemoptysis; No shortness of breath  CARDIOVASCULAR: No chest pain, palpitations, dizziness, or leg swelling  GASTROINTESTINAL: No abdominal or epigastric pain. No nausea, vomiting, or hematemesis; No diarrhea or constipation.   NEUROLOGICAL: No headaches,           Allergies    No Known Allergies    Intolerances        Social History:     FAMILY HISTORY:      MEDICATIONS  (STANDING):  atorvastatin 10 milliGRAM(s) Oral at bedtime  ceFAZolin   IVPB 2000 milliGRAM(s) IV Intermittent every 8 hours  ceFAZolin   IVPB      chlorhexidine 2% Cloths 1 Application(s) Topical daily  cholecalciferol 2000 Unit(s) Oral daily  docusate sodium 100 milliGRAM(s) Oral three times a day  ferrous    sulfate 325 milliGRAM(s) Oral daily  finasteride 5 milliGRAM(s) Oral daily  furosemide    Tablet 20 milliGRAM(s) Oral at bedtime  HYDROmorphone PCA (1 mG/mL) 30 milliLiter(s) PCA Continuous PCA Continuous  insulin glargine Injectable (LANTUS) 24 Unit(s) SubCutaneous at bedtime  insulin lispro (HumaLOG) corrective regimen sliding scale   SubCutaneous three times a day before meals  insulin lispro (HumaLOG) corrective regimen sliding scale   SubCutaneous at bedtime  insulin lispro Injectable (HumaLOG) 7 Unit(s) SubCutaneous three times a day before meals  losartan 25 milliGRAM(s) Oral daily  pantoprazole    Tablet 40 milliGRAM(s) Oral before breakfast  pregabalin 100 milliGRAM(s) Oral two times a day  senna 2 Tablet(s) Oral at bedtime  tamsulosin 0.4 milliGRAM(s) Oral two times a day    MEDICATIONS  (PRN):  cyclobenzaprine 5 milliGRAM(s) Oral three times a day PRN Muscle Spasm  naloxone Injectable 0.1 milliGRAM(s) IV Push every 3 minutes PRN For ANY of the following changes in patient status:  A. RR LESS THAN 10 breaths per minute, B. Oxygen saturation LESS THAN 90%, C. Sedation score of 6  ondansetron Injectable 4 milliGRAM(s) IV Push every 6 hours PRN Nausea      CAPILLARY BLOOD GLUCOSE      POCT Blood Glucose.: 214 mg/dL (28 Aug 2019 17:17)  POCT Blood Glucose.: 239 mg/dL (28 Aug 2019 12:46)  POCT Blood Glucose.: 186 mg/dL (28 Aug 2019 08:50)  POCT Blood Glucose.: 212 mg/dL (28 Aug 2019 06:29)  POCT Blood Glucose.: 202 mg/dL (27 Aug 2019 22:19)    I&O's Summary    27 Aug 2019 07:01  -  28 Aug 2019 07:00  --------------------------------------------------------  IN: 1725 mL / OUT: 2435 mL / NET: -710 mL    28 Aug 2019 07:01  -  28 Aug 2019 17:32  --------------------------------------------------------  IN: 1775 mL / OUT: 1007 mL / NET: 768 mL        PHYSICAL EXAM:    GENERAL: NAD  NECK: Supple, No JVD  CHEST/LUNG: Clear to auscultation bilaterally; No wheezing.  HEART: Regular rate and rhythm; No murmurs, rubs, or gallops  ABDOMEN: Soft, Nontender, Nondistended; Bowel sounds present  EXTREMITIES:  2+ Peripheral Pulses, No edema  NEUROLOGY: AAOx 3      LABS:                        9.7    9.0   )-----------( 126      ( 28 Aug 2019 02:52 )             28.3     08-28    139  |  104  |  29<H>  ----------------------------<  225<H>  5.2   |  22  |  1.98<H>    Ca    8.5      28 Aug 2019 02:52  Phos  3.5     08-28  Mg     2.0     08-28      PT/INR - ( 28 Aug 2019 02:52 )   PT: 13.1 sec;   INR: 1.14 ratio         PTT - ( 28 Aug 2019 02:52 )  PTT:30.6 sec        CAPILLARY BLOOD GLUCOSE      POCT Blood Glucose.: 214 mg/dL (28 Aug 2019 17:17)  POCT Blood Glucose.: 239 mg/dL (28 Aug 2019 12:46)  POCT Blood Glucose.: 186 mg/dL (28 Aug 2019 08:50)  POCT Blood Glucose.: 212 mg/dL (28 Aug 2019 06:29)  POCT Blood Glucose.: 202 mg/dL (27 Aug 2019 22:19)                RADIOLOGY & ADDITIONAL TESTS:    Imaging Personally Reviewed:    Consultant(s) Notes Reviewed:      Care Discussed with Consultants/Other Providers:    Thanks for consult. Will follow.

## 2019-08-28 NOTE — CHART NOTE - NSCHARTNOTEFT_GEN_A_CORE
Evaluate and measure for custom TLSO. Measurements taken supine without incident. Reviewed with daughter. Orthosis to be fit and delivered 8/29/19.  Fran DEE  [facility]  [phone]

## 2019-08-28 NOTE — PROGRESS NOTE ADULT - SUBJECTIVE AND OBJECTIVE BOX
Interval Events: underwent T11-S1 Lami/Fusion and back wound closure    S: Patient doing well, denies fevers, chills, nausea, emesis, chest pain, SOB. patient reports pain is well controlled     O: Vital Signs  T(C): 36.2 (08-28 @ 04:00), Max: 36.2 (08-27 @ 17:40)  HR: 108 (08-28 @ 05:00) (90 - 112)  BP: 117/75 (08-28 @ 05:00) (106/55 - 170/83)  RR: 17 (08-28 @ 05:00) (16 - 17)  SpO2: 100% (08-28 @ 05:00) (93% - 100%)  08-27-19 @ 07:01  -  08-28-19 @ 07:00  --------------------------------------------------------  IN: 1725 mL / OUT: 2435 mL / NET: -710 mL    08-28-19 @ 07:01  -  08-28-19 @ 07:22  --------------------------------------------------------  IN: 75 mL / OUT: 150 mL / NET: -75 mL      General: alert and oriented, NAD  Resp: airway patent, respirations unlabored  CVS: regular rate and rhythm  Abdomen: soft, nontender, nondistended  Back: Flat, dressings c/d/i, HVx2 (dark sanguinous), JPx1 (mostly serous)                          9.7    9.0   )-----------( 126      ( 28 Aug 2019 02:52 )             28.3   08-28    139  |  104  |  29<H>  ----------------------------<  225<H>  5.2   |  22  |  1.98<H>    Ca    8.5      28 Aug 2019 02:52  Phos  3.5     08-28  Mg     2.0     08-28

## 2019-08-28 NOTE — PROGRESS NOTE ADULT - ATTENDING COMMENTS
had episode of tachycardia with eating, ekg showed LBBB and     - q1 x24hr neurochecks   - continue with PCA for pain control  - restarted cardiac meds except aspirin  - ortho discuss DVT PPX, and aspirin (s/p CABG)   - valdes dc   - consult endocrine per orthopedics for management of hyperglycemia  - dc fan  - clarify weight bearing status, PT OT had episode of tachycardia with eating, ekg showed LBBB and     - q1 x24hr neurochecks   - continue with PCA for pain control  - restarted cardiac meds except aspirin  - ortho discuss DVT PPX, and aspirin (s/p CABG)   - valdes dc in consultation with urology  - consult endocrine per orthopedics for management of hyperglycemia  - dc fan  - clarify weight bearing status, PT OT

## 2019-08-29 LAB
ANION GAP SERPL CALC-SCNC: 11 MMOL/L — SIGNIFICANT CHANGE UP (ref 5–17)
BUN SERPL-MCNC: 27 MG/DL — HIGH (ref 7–23)
CALCIUM SERPL-MCNC: 8.8 MG/DL — SIGNIFICANT CHANGE UP (ref 8.4–10.5)
CHLORIDE SERPL-SCNC: 102 MMOL/L — SIGNIFICANT CHANGE UP (ref 96–108)
CHOLEST SERPL-MCNC: 75 MG/DL — SIGNIFICANT CHANGE UP (ref 10–199)
CK MB BLD-MCNC: 0.4 % — SIGNIFICANT CHANGE UP (ref 0–3.5)
CK MB CFR SERPL CALC: 8.3 NG/ML — HIGH (ref 0–6.7)
CK SERPL-CCNC: 2284 U/L — HIGH (ref 30–200)
CO2 SERPL-SCNC: 21 MMOL/L — LOW (ref 22–31)
CREAT SERPL-MCNC: 2.09 MG/DL — HIGH (ref 0.5–1.3)
GLUCOSE BLDC GLUCOMTR-MCNC: 173 MG/DL — HIGH (ref 70–99)
GLUCOSE BLDC GLUCOMTR-MCNC: 200 MG/DL — HIGH (ref 70–99)
GLUCOSE BLDC GLUCOMTR-MCNC: 201 MG/DL — HIGH (ref 70–99)
GLUCOSE BLDC GLUCOMTR-MCNC: 209 MG/DL — HIGH (ref 70–99)
GLUCOSE BLDC GLUCOMTR-MCNC: 249 MG/DL — HIGH (ref 70–99)
GLUCOSE SERPL-MCNC: 206 MG/DL — HIGH (ref 70–99)
HCT VFR BLD CALC: 28.9 % — LOW (ref 39–50)
HDLC SERPL-MCNC: 29 MG/DL — LOW
HGB BLD-MCNC: 9.9 G/DL — LOW (ref 13–17)
LIPID PNL WITH DIRECT LDL SERPL: 17 MG/DL — SIGNIFICANT CHANGE UP
MAGNESIUM SERPL-MCNC: 1.9 MG/DL — SIGNIFICANT CHANGE UP (ref 1.6–2.6)
MCHC RBC-ENTMCNC: 30.2 PG — SIGNIFICANT CHANGE UP (ref 27–34)
MCHC RBC-ENTMCNC: 34.2 GM/DL — SIGNIFICANT CHANGE UP (ref 32–36)
MCV RBC AUTO: 88.2 FL — SIGNIFICANT CHANGE UP (ref 80–100)
PHOSPHATE SERPL-MCNC: 3.6 MG/DL — SIGNIFICANT CHANGE UP (ref 2.5–4.5)
PLATELET # BLD AUTO: 132 K/UL — LOW (ref 150–400)
POTASSIUM SERPL-MCNC: 4.6 MMOL/L — SIGNIFICANT CHANGE UP (ref 3.5–5.3)
POTASSIUM SERPL-SCNC: 4.6 MMOL/L — SIGNIFICANT CHANGE UP (ref 3.5–5.3)
RBC # BLD: 3.27 M/UL — LOW (ref 4.2–5.8)
RBC # FLD: 12.1 % — SIGNIFICANT CHANGE UP (ref 10.3–14.5)
SODIUM SERPL-SCNC: 134 MMOL/L — LOW (ref 135–145)
TOTAL CHOLESTEROL/HDL RATIO MEASUREMENT: 2.6 RATIO — LOW (ref 3.4–9.6)
TRIGL SERPL-MCNC: 143 MG/DL — SIGNIFICANT CHANGE UP (ref 10–149)
TROPONIN T, HIGH SENSITIVITY RESULT: 136 NG/L — HIGH (ref 0–51)
WBC # BLD: 15.8 K/UL — HIGH (ref 3.8–10.5)
WBC # FLD AUTO: 15.8 K/UL — HIGH (ref 3.8–10.5)

## 2019-08-29 PROCEDURE — 99232 SBSQ HOSP IP/OBS MODERATE 35: CPT

## 2019-08-29 PROCEDURE — 93010 ELECTROCARDIOGRAM REPORT: CPT

## 2019-08-29 RX ORDER — INSULIN LISPRO 100/ML
9 VIAL (ML) SUBCUTANEOUS
Refills: 0 | Status: DISCONTINUED | OUTPATIENT
Start: 2019-08-29 | End: 2019-09-03

## 2019-08-29 RX ORDER — INSULIN GLARGINE 100 [IU]/ML
27 INJECTION, SOLUTION SUBCUTANEOUS AT BEDTIME
Refills: 0 | Status: DISCONTINUED | OUTPATIENT
Start: 2019-08-29 | End: 2019-09-03

## 2019-08-29 RX ADMIN — Medication 100 MILLIGRAM(S): at 14:26

## 2019-08-29 RX ADMIN — SENNA PLUS 2 TABLET(S): 8.6 TABLET ORAL at 21:35

## 2019-08-29 RX ADMIN — HYDROMORPHONE HYDROCHLORIDE 30 MILLILITER(S): 2 INJECTION INTRAMUSCULAR; INTRAVENOUS; SUBCUTANEOUS at 19:38

## 2019-08-29 RX ADMIN — Medication 100 MILLIGRAM(S): at 21:45

## 2019-08-29 RX ADMIN — Medication 2: at 19:03

## 2019-08-29 RX ADMIN — Medication 100 MILLIGRAM(S): at 18:03

## 2019-08-29 RX ADMIN — ATORVASTATIN CALCIUM 10 MILLIGRAM(S): 80 TABLET, FILM COATED ORAL at 21:35

## 2019-08-29 RX ADMIN — HYDROMORPHONE HYDROCHLORIDE 30 MILLILITER(S): 2 INJECTION INTRAMUSCULAR; INTRAVENOUS; SUBCUTANEOUS at 10:04

## 2019-08-29 RX ADMIN — Medication 2000 UNIT(S): at 11:57

## 2019-08-29 RX ADMIN — HYDROMORPHONE HYDROCHLORIDE 30 MILLILITER(S): 2 INJECTION INTRAMUSCULAR; INTRAVENOUS; SUBCUTANEOUS at 07:28

## 2019-08-29 RX ADMIN — Medication 100 MILLIGRAM(S): at 11:57

## 2019-08-29 RX ADMIN — TAMSULOSIN HYDROCHLORIDE 0.4 MILLIGRAM(S): 0.4 CAPSULE ORAL at 18:03

## 2019-08-29 RX ADMIN — LOSARTAN POTASSIUM 25 MILLIGRAM(S): 100 TABLET, FILM COATED ORAL at 05:21

## 2019-08-29 RX ADMIN — PANTOPRAZOLE SODIUM 40 MILLIGRAM(S): 20 TABLET, DELAYED RELEASE ORAL at 09:16

## 2019-08-29 RX ADMIN — HYDROMORPHONE HYDROCHLORIDE 30 MILLILITER(S): 2 INJECTION INTRAMUSCULAR; INTRAVENOUS; SUBCUTANEOUS at 04:16

## 2019-08-29 RX ADMIN — Medication 100 MILLIGRAM(S): at 05:19

## 2019-08-29 RX ADMIN — Medication 7 UNIT(S): at 12:57

## 2019-08-29 RX ADMIN — Medication 1: at 09:16

## 2019-08-29 RX ADMIN — Medication 100 MILLIGRAM(S): at 05:22

## 2019-08-29 RX ADMIN — INSULIN GLARGINE 27 UNIT(S): 100 INJECTION, SOLUTION SUBCUTANEOUS at 21:45

## 2019-08-29 RX ADMIN — Medication 20 MILLIGRAM(S): at 05:20

## 2019-08-29 RX ADMIN — INSULIN GLARGINE 24 UNIT(S): 100 INJECTION, SOLUTION SUBCUTANEOUS at 00:10

## 2019-08-29 RX ADMIN — Medication 100 MILLIGRAM(S): at 21:35

## 2019-08-29 RX ADMIN — Medication 325 MILLIGRAM(S): at 11:57

## 2019-08-29 RX ADMIN — FINASTERIDE 5 MILLIGRAM(S): 5 TABLET, FILM COATED ORAL at 11:57

## 2019-08-29 RX ADMIN — Medication 9 UNIT(S): at 19:02

## 2019-08-29 RX ADMIN — HYDROMORPHONE HYDROCHLORIDE 30 MILLILITER(S): 2 INJECTION INTRAMUSCULAR; INTRAVENOUS; SUBCUTANEOUS at 14:19

## 2019-08-29 RX ADMIN — Medication 100 MILLIGRAM(S): at 12:57

## 2019-08-29 RX ADMIN — Medication 2: at 12:57

## 2019-08-29 RX ADMIN — Medication 7 UNIT(S): at 09:16

## 2019-08-29 RX ADMIN — TAMSULOSIN HYDROCHLORIDE 0.4 MILLIGRAM(S): 0.4 CAPSULE ORAL at 05:22

## 2019-08-29 RX ADMIN — HYDROMORPHONE HYDROCHLORIDE 30 MILLILITER(S): 2 INJECTION INTRAMUSCULAR; INTRAVENOUS; SUBCUTANEOUS at 21:36

## 2019-08-29 NOTE — DIETITIAN INITIAL EVALUATION ADULT. - ADD RECOMMEND
Reinforce diet education as needed. Monitor tolerance to diet prescription, nutritional intake, weight trends, labs and skin integrity.

## 2019-08-29 NOTE — PROGRESS NOTE ADULT - SUBJECTIVE AND OBJECTIVE BOX
follow up to fit and deliver custom TLSO spinal orthosis ordered by neurosurgery  delivered by [facility] [phone]

## 2019-08-29 NOTE — DIETITIAN INITIAL EVALUATION ADULT. - PERTINENT LABORATORY DATA
(8/29) HDL 29, total cholesterol/HDL ration 2.6, POCT blood glucose 173-249, Na 134, BUN 27, Cr 2.09, glucose serum 206, eGFR if non-african american 32, creatinine kinase 2284

## 2019-08-29 NOTE — PROGRESS NOTE ADULT - SUBJECTIVE AND OBJECTIVE BOX
Contact info:   Tristan Gonzalez MD  pager [phone], please provide 10 digit call back number.   Please note that this patient may be followed by another provider tomorrow.   If no answer or after hours, please contact [phone]    Subjective: No acute event overnight. He reports some cough. Glucose a little elevated, excellent appeitie.      MEDICATIONS  (STANDING):  atorvastatin 10 milliGRAM(s) Oral at bedtime  ceFAZolin   IVPB 2000 milliGRAM(s) IV Intermittent every 8 hours  cholecalciferol 2000 Unit(s) Oral daily  docusate sodium 100 milliGRAM(s) Oral three times a day  ferrous    sulfate 325 milliGRAM(s) Oral daily  finasteride 5 milliGRAM(s) Oral daily  furosemide    Tablet 20 milliGRAM(s) Oral every 24 hours  HYDROmorphone PCA (1 mG/mL) 30 milliLiter(s) PCA Continuous PCA Continuous  insulin glargine Injectable (LANTUS) 27 Unit(s) SubCutaneous at bedtime  insulin lispro (HumaLOG) corrective regimen sliding scale   SubCutaneous three times a day before meals  insulin lispro (HumaLOG) corrective regimen sliding scale   SubCutaneous at bedtime  insulin lispro Injectable (HumaLOG) 9 Unit(s) SubCutaneous three times a day with meals  losartan 25 milliGRAM(s) Oral daily  pantoprazole    Tablet 40 milliGRAM(s) Oral before breakfast  pregabalin 100 milliGRAM(s) Oral two times a day  senna 2 Tablet(s) Oral at bedtime  tamsulosin 0.4 milliGRAM(s) Oral two times a day    MEDICATIONS  (PRN):  cyclobenzaprine 5 milliGRAM(s) Oral three times a day PRN Muscle Spasm  guaiFENesin    Syrup 100 milliGRAM(s) Oral every 6 hours PRN congestion  naloxone Injectable 0.1 milliGRAM(s) IV Push every 3 minutes PRN For ANY of the following changes in patient status:  A. RR LESS THAN 10 breaths per minute, B. Oxygen saturation LESS THAN 90%, C. Sedation score of 6  ondansetron Injectable 4 milliGRAM(s) IV Push every 6 hours PRN Nausea    Allergies    No Known Allergies      Review of Systems:  Constitutional: No fever  Eyes: No blurry vision  Neuro: No tremors  HEENT: No pain, +cough  Cardiovascular: No chest pain, palpitations  Respiratory: No SOB, no cough  GI: No nausea, vomiting, abdominal pain  : No dysuria  Skin: no rash  Psych: no depression  Endocrine: no polyuria, polydipsia  Hem/lymph: no swelling    ALL OTHER SYSTEMS REVIEWED AND NEGATIVE    PHYSICAL EXAM:  VITALS: T(C): 37.2 (08-29-19 @ 14:34)  T(F): 98.9 (08-29-19 @ 14:34), Max: 99.8 (08-29-19 @ 04:18)  HR: 94 (08-29-19 @ 14:34) (94 - 120)  BP: 127/73 (08-29-19 @ 14:34) (120/67 - 172/93)  RR:  (17 - 36)  SpO2:  (92% - 96%)  Wt(kg): --  GENERAL: NAD, well-groomed, well-developed  EYES: No proptosis, no lid lag, anicteric  HEENT:  Atraumatic, Normocephalic, moist mucous membranes  RESPIRATORY: Clear to auscultation bilaterally; No rales, rhonchi, wheezing, or rubs  CARDIOVASCULAR: Regular rate and rhythm; No murmurs; no peripheral edema  GI: Soft, nontender, non distended, normal bowel sounds  SKIN: Dry, intact, No rashes or lesions  MUSCULOSKELETAL: on body brace.   NEURO: sensation intact, extraocular movements intact, no tremor, normal reflexes  PSYCH: Alert and oriented x 3, normal affect, normal mood    POCT Blood Glucose.: 249 mg/dL (08-29-19 @ 12:36)  POCT Blood Glucose.: 200 mg/dL (08-29-19 @ 09:10)  POCT Blood Glucose.: 173 mg/dL (08-29-19 @ 00:15)  POCT Blood Glucose.: 171 mg/dL (08-28-19 @ 22:42)  POCT Blood Glucose.: 214 mg/dL (08-28-19 @ 17:17)  POCT Blood Glucose.: 239 mg/dL (08-28-19 @ 12:46)  POCT Blood Glucose.: 186 mg/dL (08-28-19 @ 08:50)  POCT Blood Glucose.: 212 mg/dL (08-28-19 @ 06:29)  POCT Blood Glucose.: 202 mg/dL (08-27-19 @ 22:19)  POCT Blood Glucose.: 185 mg/dL (08-27-19 @ 07:03)      08-29    134<L>  |  102  |  27<H>  ----------------------------<  206<H>  4.6   |  21<L>  |  2.09<H>    EGFR if : 37<L>  EGFR if non : 32<L>    Ca    8.8      08-29  Mg     1.9     08-29  Phos  3.6     08-29          Thyroid Function Tests:      Hemoglobin A1C, Whole Blood: 9.4 % <H> [4.0 - 5.6] (08-14-19 @ 00:28)

## 2019-08-29 NOTE — DIETITIAN INITIAL EVALUATION ADULT. - PERTINENT MEDS FT
lantus, humalog sliding scale, humalog standing, lasix, colace, senna, vitamin D3, ferrous sulfate, zofran

## 2019-08-29 NOTE — DIETITIAN INITIAL EVALUATION ADULT. - REASON INDICATOR FOR ASSESSMENT
Consult received for: Education  Source: patient, EMR. Pt offered use of  phone, however declined.     Per chart: 68 year old male with PMH of CAD s/p CABG x4 in 2015, CHF, T2DM, BPH, spinal stenosis s/p laminectomy and fusion (2016), chronic L2 fracuture, loosening screws. During this admission: s/p T11-S1 lami/fusion on 8/27. Now POD#2.

## 2019-08-29 NOTE — PROGRESS NOTE ADULT - ASSESSMENT
A/P: 68M s/p T11-S1 Lami/Fusion POD 2    Neuro Checks  PCA  Analgesia  LSO Brace  Drains per plastic surgery  Venodynes  WBAT  PT/OT  Encourage incentive spirometry  Will discuss with attending and advise if plan changes

## 2019-08-29 NOTE — PROGRESS NOTE ADULT - SUBJECTIVE AND OBJECTIVE BOX
Plastic surgery    S: Patient doing well, denies fevers, chills, nausea, emesis, chest pain, SOB. patient reports pain is well controlled     O: Vital Signs  Vital Signs Last 24 Hrs  T(C): 37.1 (29 Aug 2019 05:00), Max: 37.7 (29 Aug 2019 04:18)  T(F): 98.7 (29 Aug 2019 05:00), Max: 99.8 (29 Aug 2019 04:18)  HR: 99 (29 Aug 2019 05:00) (96 - 128)  BP: 145/84 (29 Aug 2019 05:00) (122/77 - 176/93)  BP(mean): 107 (28 Aug 2019 20:00) (98 - 127)  RR: 20 (29 Aug 2019 05:00) (14 - 36)  SpO2: 96% (29 Aug 2019 05:00) (91% - 100%)      08-28 @ 07:01  -  08-29 @ 07:00  --------------------------------------------------------  IN: 1925 mL / OUT: 2277 mL / NET: -352 mL      General: alert and oriented, NAD  Resp: airway patent, respirations unlabored  CVS: regular rate and rhythm  Abdomen: soft, nontender, nondistended  Back: Flat, dressings c/d/i, HVx2 (dark sanguinous), JPx1 (mostly serous)    08-29    134<L>  |  102  |  27<H>  ----------------------------<  206<H>  4.6   |  21<L>  |  2.09<H>    Ca    8.8      29 Aug 2019 03:09  Phos  3.6     08-29  Mg     1.9     08-29                          9.9    15.8  )-----------( 132      ( 29 Aug 2019 03:09 )             28.9

## 2019-08-29 NOTE — DIETITIAN INITIAL EVALUATION ADULT. - OTHER INFO
DIET HISTORY PTA: Pt reports good appetite and intake PTA. Reports typical foods include chicken, rice, tortilla, avocado, corn. Denies drinking juice, soda. States does not consume many concentrated sweets. Reports checks finger sticks 4x per day; fasting typically 120-150 mg/dL, later in day typically ~200-250 mg/dL. Pt denies having recent hypoglycemic events PTA. Takes Novolog and Basaglar PTA. Recent HgbA1c noted as 9.4% (8/14), suggesting poor recent glycemic maintenance.    WEIGHT HISTORY: Pt reports UBW of 165 pounds, denying recent weight change. Reported UBW is consistent with in-house dosing weight of 164 pounds (8/27). Per review of past RD note, pt weighed 137 pounds (6/6/2016), suggesting 27 pound weight gain in >2 years. Pt denies recent weight gain.     INTERVENTION: Pt not amendable to discussion regarding nutrition at length 2/2 discomfort sitting in chart (RN/PCA aware), however amenable to brief nutrition education. Discussed T2DM nutrition therapy, stressing carbohydrate portion sizes, pairing carbohydrates with protein, and avoiding concentrated sweets. Also discussed heart healthy/failure nutrition therapy, emphasizing importance of low sodium diet, choosing lean proteins, and increasing fruit/vegetable intake. RD provided and reviewed the following written handouts in [place]mese: Type 2 Diabetes Nutrition Therapy, Low Sodium Nutrition Therapy, MyPlate Planner. DIET HISTORY PTA: Pt reports good appetite and intake PTA. Reports typical foods include chicken, rice, tortilla, avocado, corn. Denies drinking juice, soda. States does not consume many concentrated sweets. Reports checks finger sticks 4x per day; fasting typically 120-150 mg/dL, later in day typically ~200-250 mg/dL. Pt denies having recent hypoglycemic events PTA. Takes Novolog and Basaglar PTA. Recent HgbA1c noted as 9.4% (8/14), suggesting poor recent glycemic maintenance. NKFA. Denies micronutrient supplementation.    WEIGHT HISTORY: Pt reports UBW of 165 pounds, denying recent weight change. Reported UBW is consistent with in-house dosing weight of 164 pounds (8/27). Per review of past RD note, pt weighed 137 pounds (6/6/2016), suggesting 27 pound weight gain in >2 years. Pt denies recent weight gain.     INTERVENTION: Pt not amendable to discussion regarding nutrition at length 2/2 discomfort sitting in chart (RN/PCA aware), however amenable to brief nutrition education. Discussed T2DM nutrition therapy, stressing carbohydrate portion sizes, pairing carbohydrates with protein, and avoiding concentrated sweets. Also discussed heart healthy/failure nutrition therapy, emphasizing importance of low sodium diet, choosing lean proteins, and increasing fruit/vegetable intake. RD provided and reviewed the following written handouts in [place]une[place]: Type 2 Diabetes Nutrition Therapy, Low Sodium Nutrition Therapy, MyPlate Planner.

## 2019-08-29 NOTE — PROGRESS NOTE ADULT - ASSESSMENT
A/P: 68M s/p T11-S1 Lami/Fusion.    S/p T11-S1 Lami/Fusion:    Ortho spine f/up noted.  Pain control through Dilaudid PCA    DM II:  Lantus/Humalog/FSSS  Endo f/up noted.    HTN:  Cw lasix/Losartan    CKD IV:  BMP

## 2019-08-29 NOTE — PROGRESS NOTE ADULT - SUBJECTIVE AND OBJECTIVE BOX
Patient seen and examined at bedside. Pain controlled.    PHYSICAL EXAM:  Vital Signs Last 24 Hrs  T(C): 37.1 (29 Aug 2019 05:00), Max: 37.7 (29 Aug 2019 04:18)  T(F): 98.7 (29 Aug 2019 05:00), Max: 99.8 (29 Aug 2019 04:18)  HR: 99 (29 Aug 2019 05:00) (96 - 128)  BP: 145/84 (29 Aug 2019 05:00) (122/77 - 176/93)  BP(mean): 107 (28 Aug 2019 20:00) (98 - 127)  RR: 20 (29 Aug 2019 05:00) (14 - 36)  SpO2: 96% (29 Aug 2019 05:00) (91% - 100%)  Gen: NAD, AAOx3  Spine:  Dressing clean, dry and intact  ELBERT Drain Intact  HMV x2 intact         Sensation:  intact to light touch           Motor exam:              Bilateral Lower ext.     Hip Flx  Hip Ext    Quad   Hamstrg   TA       EHL      GS                                          R        5/5        5/5        5/5        5/5          5/5     5/5      5/5                                          L         5/5        5/5        5/5        5/5          5/5     5/5      5/5

## 2019-08-29 NOTE — DIETITIAN INITIAL EVALUATION ADULT. - FACTORS AFF FOOD INTAKE
Pt reports good appetite and intake during hospitalization. Pt denies chewing/swallowing issues, nausea/vomiting, diarrhea/constipation or other signs of acute GI distress at this time. Last BM 8/27./none

## 2019-08-29 NOTE — PROGRESS NOTE ADULT - ASSESSMENT
68 yr M with spinal stenosis s/p T10-L4 laminectomy and fusion (2016), CHF, DM2 c/b CAD s/p CABG X 4 and CKD III here s/p T10-pelvis revision laminectomy and spinal fusion (8/27).     #1 Type 2 diabetes mellitus with hyperglycemia, with long-term current use of insulin. Recommendation:   -While inpatient, check FS AC and HS and keep on CHO diet  -Goal glucose 100-180  -Recommend increasing Lantus to 27 Units HS and Humalog to 9 U TID AC plus low dose Humalog scale before meals and at bedtime  -Patient would benefit from nutrition consult and nursing teaching on proper insulin injection technique  -Discharge on basal/bolus (doses tbd prior to discharge)  -Patient can follow up with private endocrinologist or at [facility] [address] [facility] [address] [place] [address] [phone]    On evenings and weekends, please call [phone] or page endocrine fellow on call.

## 2019-08-29 NOTE — DIETITIAN INITIAL EVALUATION ADULT. - PHYSICAL APPEARANCE
Nutrition Focused Physical Exam not appropriate at this time as pt reports pain and discomfort "all over". Per visual examination, no overt signs of muscle wasting or fat loss suggesting malnourishment at this time. RD to follow up to conduct Nutrition Focused Physical Exam as able./well nourished/other (specify) Per documentation, pt noted with midline back surgical incision, 1+ bilateral ankle edema.     Ht: 64 inches, Wt: 164 pounds, BMI: 28.3 kg/m2, IBW: 130 pounds (+/- 10%), %IBW: 126%

## 2019-08-29 NOTE — PROVIDER CONTACT NOTE (CRITICAL VALUE NOTIFICATION) - ASSESSMENT
Pt asymptomatic. VSS, except tachycardic. No SOB, not diaphoretic. No chest pain, dizziness, headache noted.

## 2019-08-29 NOTE — PROGRESS NOTE ADULT - ASSESSMENT
68M POD2 from T11-S1 Lami/Fusion and back wound closure. Recovering appropriately on floor, Drains and dressing with appropriate output and appearance    - Continue to monitor drain outputs and back dressing  - Care per primary, Orthopedic surgery    Plastic surgery  [phone]

## 2019-08-30 LAB
GLUCOSE BLDC GLUCOMTR-MCNC: 138 MG/DL — HIGH (ref 70–99)
GLUCOSE BLDC GLUCOMTR-MCNC: 200 MG/DL — HIGH (ref 70–99)
GLUCOSE BLDC GLUCOMTR-MCNC: 205 MG/DL — HIGH (ref 70–99)
GLUCOSE BLDC GLUCOMTR-MCNC: 207 MG/DL — HIGH (ref 70–99)

## 2019-08-30 RX ORDER — SODIUM CHLORIDE 9 MG/ML
1000 INJECTION INTRAMUSCULAR; INTRAVENOUS; SUBCUTANEOUS
Refills: 0 | Status: DISCONTINUED | OUTPATIENT
Start: 2019-08-30 | End: 2019-09-02

## 2019-08-30 RX ORDER — OXYCODONE HYDROCHLORIDE 5 MG/1
10 TABLET ORAL EVERY 4 HOURS
Refills: 0 | Status: DISCONTINUED | OUTPATIENT
Start: 2019-08-30 | End: 2019-09-03

## 2019-08-30 RX ORDER — TRAMADOL HYDROCHLORIDE 50 MG/1
50 TABLET ORAL EVERY 4 HOURS
Refills: 0 | Status: DISCONTINUED | OUTPATIENT
Start: 2019-08-30 | End: 2019-09-03

## 2019-08-30 RX ORDER — MAGNESIUM HYDROXIDE 400 MG/1
30 TABLET, CHEWABLE ORAL DAILY
Refills: 0 | Status: DISCONTINUED | OUTPATIENT
Start: 2019-08-30 | End: 2019-09-03

## 2019-08-30 RX ORDER — OXYCODONE HYDROCHLORIDE 5 MG/1
5 TABLET ORAL EVERY 4 HOURS
Refills: 0 | Status: DISCONTINUED | OUTPATIENT
Start: 2019-08-30 | End: 2019-09-03

## 2019-08-30 RX ORDER — POLYETHYLENE GLYCOL 3350 17 G/17G
17 POWDER, FOR SOLUTION ORAL DAILY
Refills: 0 | Status: DISCONTINUED | OUTPATIENT
Start: 2019-08-30 | End: 2019-09-03

## 2019-08-30 RX ADMIN — Medication 9 UNIT(S): at 12:05

## 2019-08-30 RX ADMIN — Medication 100 MILLIGRAM(S): at 05:39

## 2019-08-30 RX ADMIN — Medication 100 MILLIGRAM(S): at 22:05

## 2019-08-30 RX ADMIN — Medication 9 UNIT(S): at 18:05

## 2019-08-30 RX ADMIN — OXYCODONE HYDROCHLORIDE 10 MILLIGRAM(S): 5 TABLET ORAL at 16:21

## 2019-08-30 RX ADMIN — Medication 9 UNIT(S): at 08:27

## 2019-08-30 RX ADMIN — PANTOPRAZOLE SODIUM 40 MILLIGRAM(S): 20 TABLET, DELAYED RELEASE ORAL at 05:38

## 2019-08-30 RX ADMIN — TAMSULOSIN HYDROCHLORIDE 0.4 MILLIGRAM(S): 0.4 CAPSULE ORAL at 18:06

## 2019-08-30 RX ADMIN — TAMSULOSIN HYDROCHLORIDE 0.4 MILLIGRAM(S): 0.4 CAPSULE ORAL at 05:38

## 2019-08-30 RX ADMIN — Medication 100 MILLIGRAM(S): at 13:24

## 2019-08-30 RX ADMIN — Medication 100 MILLIGRAM(S): at 05:38

## 2019-08-30 RX ADMIN — Medication 100 MILLIGRAM(S): at 05:41

## 2019-08-30 RX ADMIN — OXYCODONE HYDROCHLORIDE 10 MILLIGRAM(S): 5 TABLET ORAL at 22:33

## 2019-08-30 RX ADMIN — Medication 100 MILLIGRAM(S): at 18:06

## 2019-08-30 RX ADMIN — HYDROMORPHONE HYDROCHLORIDE 30 MILLILITER(S): 2 INJECTION INTRAMUSCULAR; INTRAVENOUS; SUBCUTANEOUS at 07:52

## 2019-08-30 RX ADMIN — Medication 2000 UNIT(S): at 11:43

## 2019-08-30 RX ADMIN — Medication 100 MILLIGRAM(S): at 12:05

## 2019-08-30 RX ADMIN — SENNA PLUS 2 TABLET(S): 8.6 TABLET ORAL at 22:06

## 2019-08-30 RX ADMIN — OXYCODONE HYDROCHLORIDE 10 MILLIGRAM(S): 5 TABLET ORAL at 16:50

## 2019-08-30 RX ADMIN — HYDROMORPHONE HYDROCHLORIDE 30 MILLILITER(S): 2 INJECTION INTRAMUSCULAR; INTRAVENOUS; SUBCUTANEOUS at 01:28

## 2019-08-30 RX ADMIN — ATORVASTATIN CALCIUM 10 MILLIGRAM(S): 80 TABLET, FILM COATED ORAL at 22:05

## 2019-08-30 RX ADMIN — MAGNESIUM HYDROXIDE 30 MILLILITER(S): 400 TABLET, CHEWABLE ORAL at 16:21

## 2019-08-30 RX ADMIN — FINASTERIDE 5 MILLIGRAM(S): 5 TABLET, FILM COATED ORAL at 11:43

## 2019-08-30 RX ADMIN — CYCLOBENZAPRINE HYDROCHLORIDE 5 MILLIGRAM(S): 10 TABLET, FILM COATED ORAL at 22:36

## 2019-08-30 RX ADMIN — HYDROMORPHONE HYDROCHLORIDE 30 MILLILITER(S): 2 INJECTION INTRAMUSCULAR; INTRAVENOUS; SUBCUTANEOUS at 11:43

## 2019-08-30 RX ADMIN — Medication 2: at 12:06

## 2019-08-30 RX ADMIN — Medication 20 MILLIGRAM(S): at 05:38

## 2019-08-30 RX ADMIN — Medication 325 MILLIGRAM(S): at 11:43

## 2019-08-30 RX ADMIN — LOSARTAN POTASSIUM 25 MILLIGRAM(S): 100 TABLET, FILM COATED ORAL at 05:38

## 2019-08-30 RX ADMIN — Medication 1: at 18:05

## 2019-08-30 RX ADMIN — INSULIN GLARGINE 27 UNIT(S): 100 INJECTION, SOLUTION SUBCUTANEOUS at 22:06

## 2019-08-30 RX ADMIN — Medication 100 MILLIGRAM(S): at 22:06

## 2019-08-30 RX ADMIN — HYDROMORPHONE HYDROCHLORIDE 30 MILLILITER(S): 2 INJECTION INTRAMUSCULAR; INTRAVENOUS; SUBCUTANEOUS at 05:42

## 2019-08-30 NOTE — OCCUPATIONAL THERAPY INITIAL EVALUATION ADULT - PRECAUTIONS/LIMITATIONS, REHAB EVAL
s/p Fusion of 7 to 12 spinal segments by posterior approach,Revision, laminectomy, lumbar, 6 levels and Re-exploration of lumbar  laminectomy site 8/27./spinal precautions spinal precautions/s/p Fusion of 7 to 12 spinal segments by posterior approach,Revision, laminectomy, lumbar, 6 levels and Re-exploration of lumbar  laminectomy site 8/27./fall precautions

## 2019-08-30 NOTE — PROGRESS NOTE ADULT - ASSESSMENT
A/P: 68M s/p T11-S1 Lami/Fusion POD 3    Neuro Checks  PCA  Analgesia  LSO Brace  Drains per plastic surgery  Venodynes  WBAT  PT/OT  Plan for ALIF on 9/3; Vascular surgery aware  Encourage incentive spirometry  Will discuss with attending and advise if plan changes

## 2019-08-30 NOTE — PROGRESS NOTE ADULT - SUBJECTIVE AND OBJECTIVE BOX
Patient seen and examined at bedside. Pain is well controlled. No acute complaints at this time. No acute events overnight however, patient spilled hot coffee on chest and abdomen yesterday afternoon.    PHYSICAL EXAM:  Vital Signs Last 24 Hrs  T(C): 37.7 (30 Aug 2019 05:35), Max: 37.7 (29 Aug 2019 21:32)  T(F): 99.9 (30 Aug 2019 05:35), Max: 99.9 (30 Aug 2019 02:00)  HR: 89 (30 Aug 2019 05:35) (89 - 110)  BP: 161/84 (30 Aug 2019 05:35) (120/67 - 161/84)  BP(mean): --  RR: 16 (30 Aug 2019 05:35) (16 - 20)  SpO2: 95% (30 Aug 2019 05:35) (94% - 96%)    Gen: NAD, AAOx3  Spine:  Dressing clean, dry and intact  ELBERT Drain Intact  HMV x2 intact         Sensation:  intact to light touch           Motor exam:              Bilateral Lower ext.     Hip Flx  Hip Ext    Quad   Hamstrg   TA       EHL      GS                                          R        5/5        5/5        5/5        5/5          5/5     5/5      5/5                                          L         5/5        5/5        5/5        5/5          5/5     5/5      5/5

## 2019-08-30 NOTE — PROGRESS NOTE ADULT - SUBJECTIVE AND OBJECTIVE BOX
Day 3 of Anesthesia Pain Management Service    SUBJECTIVE: Patient is doing well with IV PCA    Pain Scale Score:	[X] Refer to charted pain scores    THERAPY:    [ ] IV PCA Morphine		[ ] 5 mg/mL	[ ] 1 mg/mL  [X] IV PCA Hydromorphone	[ ] 5 mg/mL	[X] 1 mg/mL  [ ] IV PCA Fentanyl		[ ] 50 micrograms/mL    Demand dose: 0.2 mg     Lockout: 6 minutes   Continuous Rate: 0 mg/hr  4 Hour Limit: 4 mg    MEDICATIONS  (STANDING):  atorvastatin 10 milliGRAM(s) Oral at bedtime  ceFAZolin   IVPB 2000 milliGRAM(s) IV Intermittent every 8 hours  cholecalciferol 2000 Unit(s) Oral daily  docusate sodium 100 milliGRAM(s) Oral three times a day  ferrous    sulfate 325 milliGRAM(s) Oral daily  finasteride 5 milliGRAM(s) Oral daily  furosemide    Tablet 20 milliGRAM(s) Oral every 24 hours  HYDROmorphone PCA (1 mG/mL) 30 milliLiter(s) PCA Continuous PCA Continuous  insulin glargine Injectable (LANTUS) 27 Unit(s) SubCutaneous at bedtime  insulin lispro (HumaLOG) corrective regimen sliding scale   SubCutaneous three times a day before meals  insulin lispro (HumaLOG) corrective regimen sliding scale   SubCutaneous at bedtime  insulin lispro Injectable (HumaLOG) 9 Unit(s) SubCutaneous three times a day with meals  losartan 25 milliGRAM(s) Oral daily  pantoprazole    Tablet 40 milliGRAM(s) Oral before breakfast  pregabalin 100 milliGRAM(s) Oral two times a day  senna 2 Tablet(s) Oral at bedtime  sodium chloride 0.9%. 1000 milliLiter(s) (20 mL/Hr) IV Continuous <Continuous>  tamsulosin 0.4 milliGRAM(s) Oral two times a day    MEDICATIONS  (PRN):  cyclobenzaprine 5 milliGRAM(s) Oral three times a day PRN Muscle Spasm  guaiFENesin    Syrup 100 milliGRAM(s) Oral every 6 hours PRN congestion  naloxone Injectable 0.1 milliGRAM(s) IV Push every 3 minutes PRN For ANY of the following changes in patient status:  A. RR LESS THAN 10 breaths per minute, B. Oxygen saturation LESS THAN 90%, C. Sedation score of 6  ondansetron Injectable 4 milliGRAM(s) IV Push every 6 hours PRN Nausea  polyethylene glycol 3350 17 Gram(s) Oral daily PRN Constipation      OBJECTIVE:    Sedation Score:	[ X] Alert	[ ] Drowsy 	[ ] Arousable	[ ] Asleep	[ ] Unresponsive    Side Effects:	[X ] None	[ ] Nausea	[ ] Vomiting	[ ] Pruritus  		[ ] Other:    Vital Signs Last 24 Hrs  T(C): 36.5 (30 Aug 2019 08:21), Max: 37.7 (29 Aug 2019 21:32)  T(F): 97.7 (30 Aug 2019 08:21), Max: 99.9 (30 Aug 2019 02:00)  HR: 99 (30 Aug 2019 08:21) (89 - 110)  BP: 127/74 (30 Aug 2019 08:21) (120/67 - 161/84)  BP(mean): --  RR: 18 (30 Aug 2019 08:21) (16 - 20)  SpO2: 98% (30 Aug 2019 08:21) (94% - 98%)    ASSESSMENT/ PLAN    Therapy to  be:               [X] Continued   [ ] Discontinued   [ ] Changed to PRN Analgesics    Documentation and Verification of current medications:   [X] Done	[ ] Not done, not eligible    Comments:

## 2019-08-30 NOTE — PROVIDER CONTACT NOTE (OTHER) - ACTION/TREATMENT ORDERED:
PA assessed dsg and applied new gauze and Tegaderm. PCA encouraged for pain management. Self suction maintained on hemovac. Will continue to monitor.

## 2019-08-30 NOTE — PROVIDER CONTACT NOTE (OTHER) - ASSESSMENT
Pt's Hemovac dsg saturated with serosanguineous fluid and soaked through to inc pad. Hemovac drain in place at incision site. Small output noted in hemovac drain. self suction maintained on drain. VSS. Pt able to move all extremities. limited ROM on LUE and c/o pain on b/l feet, pulses present. AxOx4.

## 2019-08-30 NOTE — PROGRESS NOTE ADULT - ASSESSMENT
68M POD3 from T11-S1 Lami/Fusion and back wound closure. Recovering appropriately on floor, Drains and dressing with appropriate output and appearance    - Continue to monitor drain outputs and back dressing. Changed back dressing today  - Care per primary, Orthopedic surgery    Plastic surgery  [phone]

## 2019-08-30 NOTE — CHART NOTE - NSCHARTNOTEFT_GEN_A_CORE
Patient seen bedside secondary to receiving call from nurse that Right Hemovac Drain dressing was completely saturated.  Patient dressing observed and 4x4 dressing with surgical was completely saturated.  Removed dressing and applied new 4x4 and Tegaderm.  Patient in no acute distress after new dressing application.  Nurse made aware.    Christoph Roy PA-C  Orthopedic Surgery   Team Pager #[phone]

## 2019-08-30 NOTE — PROGRESS NOTE ADULT - ASSESSMENT
A/P: 68M s/p T11-S1 Lami/Fusion.    S/p T11-S1 Lami/Fusion:    Ortho spine f/up noted.  Pain control through Dilaudid PCA    DM II:  Lantus/Humalog/FSSS  Endo f/up noted.    Leukocytosis:  Will monitor    HTN:  Cw lasix/Losartan    CKD IV:  BMP/Cr stable

## 2019-08-30 NOTE — PROGRESS NOTE ADULT - SUBJECTIVE AND OBJECTIVE BOX
Patient is a 68y old  Male who presents with a chief complaint of back pain (28 Aug 2019 14:15)      SUBJECTIVE / OVERNIGHT EVENTS:    Events noted.  CONSTITUTIONAL: Rt leg and lt shoulder pain  RESPIRATORY: No cough, wheezing,  No shortness of breath  CARDIOVASCULAR: No chest pain, palpitations, dizziness, or leg swelling  GASTROINTESTINAL: No abdominal or epigastric pain. No nausea, vomiting.  NEUROLOGICAL: No headaches,     MEDICATIONS  (STANDING):  atorvastatin 10 milliGRAM(s) Oral at bedtime  ceFAZolin   IVPB 2000 milliGRAM(s) IV Intermittent every 8 hours  cholecalciferol 2000 Unit(s) Oral daily  docusate sodium 100 milliGRAM(s) Oral three times a day  ferrous    sulfate 325 milliGRAM(s) Oral daily  finasteride 5 milliGRAM(s) Oral daily  furosemide    Tablet 20 milliGRAM(s) Oral every 24 hours  insulin glargine Injectable (LANTUS) 27 Unit(s) SubCutaneous at bedtime  insulin lispro (HumaLOG) corrective regimen sliding scale   SubCutaneous three times a day before meals  insulin lispro (HumaLOG) corrective regimen sliding scale   SubCutaneous at bedtime  insulin lispro Injectable (HumaLOG) 9 Unit(s) SubCutaneous three times a day with meals  losartan 25 milliGRAM(s) Oral daily  pantoprazole    Tablet 40 milliGRAM(s) Oral before breakfast  pregabalin 100 milliGRAM(s) Oral two times a day  senna 2 Tablet(s) Oral at bedtime  sodium chloride 0.9%. 1000 milliLiter(s) (20 mL/Hr) IV Continuous <Continuous>  tamsulosin 0.4 milliGRAM(s) Oral two times a day    MEDICATIONS  (PRN):  cyclobenzaprine 5 milliGRAM(s) Oral three times a day PRN Muscle Spasm  guaiFENesin    Syrup 100 milliGRAM(s) Oral every 6 hours PRN congestion  magnesium hydroxide Suspension 30 milliLiter(s) Oral daily PRN Constipation  ondansetron Injectable 4 milliGRAM(s) IV Push every 6 hours PRN Nausea  oxyCODONE    IR 10 milliGRAM(s) Oral every 4 hours PRN Severe Pain (7 - 10)  oxyCODONE    IR 5 milliGRAM(s) Oral every 4 hours PRN Moderate Pain (4 - 6)  polyethylene glycol 3350 17 Gram(s) Oral daily PRN Constipation  traMADol 50 milliGRAM(s) Oral every 4 hours PRN Mild Pain (1 - 3)        CAPILLARY BLOOD GLUCOSE      POCT Blood Glucose.: 200 mg/dL (30 Aug 2019 17:25)  POCT Blood Glucose.: 205 mg/dL (30 Aug 2019 12:04)  POCT Blood Glucose.: 138 mg/dL (30 Aug 2019 08:22)  POCT Blood Glucose.: 201 mg/dL (29 Aug 2019 21:41)  POCT Blood Glucose.: 209 mg/dL (29 Aug 2019 18:41)    I&O's Summary    29 Aug 2019 07:01  -  30 Aug 2019 07:00  --------------------------------------------------------  IN: 990 mL / OUT: 3471 mL / NET: -2481 mL    30 Aug 2019 07:01  -  30 Aug 2019 18:16  --------------------------------------------------------  IN: 610 mL / OUT: 940 mL / NET: -330 mL        PHYSICAL EXAM:    NECK: Supple, No JVD  CHEST/LUNG: Clear to auscultation bilaterally; No wheezing.  HEART: Regular rate and rhythm; No murmurs, rubs, or gallops  ABDOMEN: Soft, Nontender, Nondistended; Bowel sounds present  EXTREMITIES:   No edema  NEUROLOGY: AAO       LABS:                        9.9    15.8  )-----------( 132      ( 29 Aug 2019 03:09 )             28.9     08-29    134<L>  |  102  |  27<H>  ----------------------------<  206<H>  4.6   |  21<L>  |  2.09<H>    Ca    8.8      29 Aug 2019 03:09  Phos  3.6     08-29  Mg     1.9     08-29      PT/INR - ( 28 Aug 2019 21:39 )   PT: 14.7 sec;   INR: 1.27 ratio         PTT - ( 28 Aug 2019 21:39 )  PTT:31.5 sec  CARDIAC MARKERS ( 29 Aug 2019 03:09 )  x     / x     / 2284 U/L / x     / 8.3 ng/mL  CARDIAC MARKERS ( 28 Aug 2019 21:39 )  x     / x     / 2565 U/L / x     / 9.8 ng/mL        CAPILLARY BLOOD GLUCOSE      POCT Blood Glucose.: 200 mg/dL (30 Aug 2019 17:25)  POCT Blood Glucose.: 205 mg/dL (30 Aug 2019 12:04)  POCT Blood Glucose.: 138 mg/dL (30 Aug 2019 08:22)  POCT Blood Glucose.: 201 mg/dL (29 Aug 2019 21:41)  POCT Blood Glucose.: 209 mg/dL (29 Aug 2019 18:41)                RADIOLOGY & ADDITIONAL TESTS:    Imaging Personally Reviewed:    Consultant(s) Notes Reviewed:      Care Discussed with Consultants/Other Providers:

## 2019-08-30 NOTE — PROGRESS NOTE ADULT - SUBJECTIVE AND OBJECTIVE BOX
Plastic surgery    S: Patient doing well, denies fevers, chills, nausea, emesis, chest pain, SOB. patient reports pain is well controlled     O:  Vital Signs Last 24 Hrs  T(C): 37.7 (30 Aug 2019 05:35), Max: 37.7 (29 Aug 2019 21:32)  T(F): 99.9 (30 Aug 2019 05:35), Max: 99.9 (30 Aug 2019 02:00)  HR: 89 (30 Aug 2019 05:35) (89 - 110)  BP: 161/84 (30 Aug 2019 05:35) (120/67 - 161/84)  BP(mean): --  RR: 16 (30 Aug 2019 05:35) (16 - 20)  SpO2: 95% (30 Aug 2019 05:35) (94% - 96%)      08-29 @ 07:01  -  08-30 @ 07:00  --------------------------------------------------------  IN: 990 mL / OUT: 3471 mL / NET: -2481 mL      General: alert and oriented, NAD  Resp: airway patent, respirations unlabored  CVS: regular rate and rhythm  Abdomen: soft, nontender, nondistended  Back: Flat, dressings c/d/i, HVx2 (dark sanguinous), JPx1 (mostly serous)

## 2019-08-31 LAB
GLUCOSE BLDC GLUCOMTR-MCNC: 119 MG/DL — HIGH (ref 70–99)
GLUCOSE BLDC GLUCOMTR-MCNC: 154 MG/DL — HIGH (ref 70–99)
GLUCOSE BLDC GLUCOMTR-MCNC: 170 MG/DL — HIGH (ref 70–99)
GLUCOSE BLDC GLUCOMTR-MCNC: 85 MG/DL — SIGNIFICANT CHANGE UP (ref 70–99)

## 2019-08-31 RX ADMIN — OXYCODONE HYDROCHLORIDE 10 MILLIGRAM(S): 5 TABLET ORAL at 22:07

## 2019-08-31 RX ADMIN — Medication 100 MILLIGRAM(S): at 22:07

## 2019-08-31 RX ADMIN — Medication 9 UNIT(S): at 17:22

## 2019-08-31 RX ADMIN — Medication 100 MILLIGRAM(S): at 17:22

## 2019-08-31 RX ADMIN — INSULIN GLARGINE 27 UNIT(S): 100 INJECTION, SOLUTION SUBCUTANEOUS at 22:09

## 2019-08-31 RX ADMIN — Medication 100 MILLIGRAM(S): at 07:01

## 2019-08-31 RX ADMIN — Medication 1: at 10:00

## 2019-08-31 RX ADMIN — Medication 100 MILLIGRAM(S): at 13:12

## 2019-08-31 RX ADMIN — Medication 100 MILLIGRAM(S): at 13:13

## 2019-08-31 RX ADMIN — FINASTERIDE 5 MILLIGRAM(S): 5 TABLET, FILM COATED ORAL at 12:04

## 2019-08-31 RX ADMIN — TAMSULOSIN HYDROCHLORIDE 0.4 MILLIGRAM(S): 0.4 CAPSULE ORAL at 07:01

## 2019-08-31 RX ADMIN — SENNA PLUS 2 TABLET(S): 8.6 TABLET ORAL at 22:07

## 2019-08-31 RX ADMIN — OXYCODONE HYDROCHLORIDE 10 MILLIGRAM(S): 5 TABLET ORAL at 10:30

## 2019-08-31 RX ADMIN — Medication 325 MILLIGRAM(S): at 12:03

## 2019-08-31 RX ADMIN — Medication 2000 UNIT(S): at 12:03

## 2019-08-31 RX ADMIN — ATORVASTATIN CALCIUM 10 MILLIGRAM(S): 80 TABLET, FILM COATED ORAL at 22:07

## 2019-08-31 RX ADMIN — Medication 9 UNIT(S): at 13:12

## 2019-08-31 RX ADMIN — PANTOPRAZOLE SODIUM 40 MILLIGRAM(S): 20 TABLET, DELAYED RELEASE ORAL at 07:01

## 2019-08-31 RX ADMIN — TRAMADOL HYDROCHLORIDE 50 MILLIGRAM(S): 50 TABLET ORAL at 13:45

## 2019-08-31 RX ADMIN — OXYCODONE HYDROCHLORIDE 10 MILLIGRAM(S): 5 TABLET ORAL at 10:00

## 2019-08-31 RX ADMIN — TAMSULOSIN HYDROCHLORIDE 0.4 MILLIGRAM(S): 0.4 CAPSULE ORAL at 17:22

## 2019-08-31 RX ADMIN — LOSARTAN POTASSIUM 25 MILLIGRAM(S): 100 TABLET, FILM COATED ORAL at 07:01

## 2019-08-31 RX ADMIN — Medication 20 MILLIGRAM(S): at 07:01

## 2019-08-31 RX ADMIN — Medication 9 UNIT(S): at 10:00

## 2019-08-31 RX ADMIN — TRAMADOL HYDROCHLORIDE 50 MILLIGRAM(S): 50 TABLET ORAL at 13:13

## 2019-08-31 NOTE — PROGRESS NOTE ADULT - SUBJECTIVE AND OBJECTIVE BOX
POST OPERATIVE DAY #:  4    Patient seen and examined at bedside. Resting comfortably. No acute events overnight.    Exam:   Alert/Oriented, No Acute Distress         Dressingx: Aquacel c/d/i, Right hemovac dressing site moderate saturation with serousanguinous fluid         Drains: ELBERT x 1; maintaining good suction - serous fluid; HV x 2 intact         Motor exam: [  ]                [ ] Lower extremity            PF          DF         EHL       FHL                                                                                            R        5/5        5/5        5/5       5/5                                                        L         5/5        5/5        5/5       5/5                   Sensation: [x ] intact to light touch  [ ] decreased                                             Calves Soft/Non-tender bilaterally           Toes warm well perfused; capillary refill <3 seconds              RADIOLOGY & ADDITIONAL STUDIES:      A/P : Patient is a 68y Male s/p T11-S1 lami/fusion. Currently awaiting ALIF scheduled for Tuesday 9/3  -    Pain control - PO meds PRN  -    DVT ppx: SCDs       -    Physical Therapy  -    Weight bearing status: WBAT [x]        PWB    [ ]     TTWB  [ ]      NWB  [ ]  -    LSO brace when OOB  -    Drains as per plastic surgery    Michelle Gresham PA-C  Team Pager #[phone]

## 2019-08-31 NOTE — CHART NOTE - NSCHARTNOTEFT_GEN_A_CORE
Patient visited for HV x 2 pull as per Plastics team. Sutures d/c'd.  Hemostasis achieved, patient tolerated well, tips intact. 4x4 Dsg/tegaderms placed. Aquacel dressing replaced as drain sites were underneath bandage.     Michelle Gresham PA-C  Team Pager: [phone]

## 2019-08-31 NOTE — PROGRESS NOTE ADULT - ASSESSMENT
68M POD4 from T11-S1 Lami/Fusion and back wound closure. Recovering appropriately on floor, Drains and dressing with appropriate output and appearance    - Continue to monitor drain outputs and back dressing. Will change back dressing Monday (9/2). Replaced saturated gauze this am  - Care per primary, Orthopedic surgery    Plastic surgery  [phone]

## 2019-08-31 NOTE — PROGRESS NOTE ADULT - SUBJECTIVE AND OBJECTIVE BOX
Plastic surgery    S: Patient doing well, denies fevers, chills, nausea, emesis, chest pain, SOB. patient reports pain is well controlled     O:  ICU Vital Signs Last 24 Hrs  T(C): 37.7 (31 Aug 2019 04:43), Max: 37.7 (31 Aug 2019 04:43)  T(F): 99.8 (31 Aug 2019 04:43), Max: 99.8 (31 Aug 2019 04:43)  HR: 90 (31 Aug 2019 04:43) (90 - 117)  BP: 136/83 (31 Aug 2019 04:43) (104/66 - 157/85)  BP(mean): --  ABP: --  ABP(mean): --  RR: 18 (31 Aug 2019 04:43) (16 - 18)  SpO2: 98% (31 Aug 2019 04:43) (95% - 98%)      08-30 @ 07:01  -  08-31 @ 07:00  --------------------------------------------------------  IN: 730 mL / OUT: 3725 mL / NET: -2995 mL      General: alert and oriented, NAD  Resp: airway patent, respirations unlabored  CVS: regular rate and rhythm  Abdomen: soft, nontender, nondistended  Back: Flat, dressings saturated but intact, HVx2 (sanguinous), JPx1 (mostly serous)

## 2019-09-01 LAB
GLUCOSE BLDC GLUCOMTR-MCNC: 120 MG/DL — HIGH (ref 70–99)
GLUCOSE BLDC GLUCOMTR-MCNC: 135 MG/DL — HIGH (ref 70–99)
GLUCOSE BLDC GLUCOMTR-MCNC: 145 MG/DL — HIGH (ref 70–99)
GLUCOSE BLDC GLUCOMTR-MCNC: 203 MG/DL — HIGH (ref 70–99)

## 2019-09-01 PROCEDURE — 73030 X-RAY EXAM OF SHOULDER: CPT | Mod: 26,LT

## 2019-09-01 RX ADMIN — Medication 2000 UNIT(S): at 11:35

## 2019-09-01 RX ADMIN — MAGNESIUM HYDROXIDE 30 MILLILITER(S): 400 TABLET, CHEWABLE ORAL at 06:23

## 2019-09-01 RX ADMIN — OXYCODONE HYDROCHLORIDE 10 MILLIGRAM(S): 5 TABLET ORAL at 09:22

## 2019-09-01 RX ADMIN — Medication 100 MILLIGRAM(S): at 06:23

## 2019-09-01 RX ADMIN — OXYCODONE HYDROCHLORIDE 10 MILLIGRAM(S): 5 TABLET ORAL at 01:55

## 2019-09-01 RX ADMIN — INSULIN GLARGINE 27 UNIT(S): 100 INJECTION, SOLUTION SUBCUTANEOUS at 22:23

## 2019-09-01 RX ADMIN — FINASTERIDE 5 MILLIGRAM(S): 5 TABLET, FILM COATED ORAL at 11:35

## 2019-09-01 RX ADMIN — Medication 2: at 12:47

## 2019-09-01 RX ADMIN — PANTOPRAZOLE SODIUM 40 MILLIGRAM(S): 20 TABLET, DELAYED RELEASE ORAL at 06:23

## 2019-09-01 RX ADMIN — TAMSULOSIN HYDROCHLORIDE 0.4 MILLIGRAM(S): 0.4 CAPSULE ORAL at 06:23

## 2019-09-01 RX ADMIN — Medication 100 MILLIGRAM(S): at 14:48

## 2019-09-01 RX ADMIN — Medication 100 MILLIGRAM(S): at 13:03

## 2019-09-01 RX ADMIN — Medication 9 UNIT(S): at 09:16

## 2019-09-01 RX ADMIN — Medication 9 UNIT(S): at 18:43

## 2019-09-01 RX ADMIN — Medication 9 UNIT(S): at 12:47

## 2019-09-01 RX ADMIN — SENNA PLUS 2 TABLET(S): 8.6 TABLET ORAL at 21:14

## 2019-09-01 RX ADMIN — OXYCODONE HYDROCHLORIDE 10 MILLIGRAM(S): 5 TABLET ORAL at 22:00

## 2019-09-01 RX ADMIN — Medication 100 MILLIGRAM(S): at 18:33

## 2019-09-01 RX ADMIN — Medication 100 MILLIGRAM(S): at 21:14

## 2019-09-01 RX ADMIN — LOSARTAN POTASSIUM 25 MILLIGRAM(S): 100 TABLET, FILM COATED ORAL at 06:23

## 2019-09-01 RX ADMIN — Medication 100 MILLIGRAM(S): at 22:23

## 2019-09-01 RX ADMIN — OXYCODONE HYDROCHLORIDE 10 MILLIGRAM(S): 5 TABLET ORAL at 10:32

## 2019-09-01 RX ADMIN — OXYCODONE HYDROCHLORIDE 10 MILLIGRAM(S): 5 TABLET ORAL at 14:52

## 2019-09-01 RX ADMIN — Medication 20 MILLIGRAM(S): at 06:23

## 2019-09-01 RX ADMIN — OXYCODONE HYDROCHLORIDE 10 MILLIGRAM(S): 5 TABLET ORAL at 21:14

## 2019-09-01 RX ADMIN — Medication 100 MILLIGRAM(S): at 23:23

## 2019-09-01 RX ADMIN — TAMSULOSIN HYDROCHLORIDE 0.4 MILLIGRAM(S): 0.4 CAPSULE ORAL at 18:33

## 2019-09-01 RX ADMIN — OXYCODONE HYDROCHLORIDE 10 MILLIGRAM(S): 5 TABLET ORAL at 15:29

## 2019-09-01 RX ADMIN — Medication 325 MILLIGRAM(S): at 11:35

## 2019-09-01 RX ADMIN — Medication 100 MILLIGRAM(S): at 14:52

## 2019-09-01 RX ADMIN — ATORVASTATIN CALCIUM 10 MILLIGRAM(S): 80 TABLET, FILM COATED ORAL at 21:14

## 2019-09-01 NOTE — PROGRESS NOTE ADULT - ASSESSMENT
68M POD5 from T11-S1 Lami/Fusion and back wound closure. Recovering appropriately on floor, Drains and dressing with appropriate output and appearance    -  Will change back dressing Monday (9/2).  - Care per primary, Orthopedic surgery    Plastic surgery  [phone]

## 2019-09-01 NOTE — PROGRESS NOTE ADULT - ASSESSMENT
A/P: 68M s/p T11-S1 Lami/Fusion.    S/p T11-S1 Lami/Fusion:    Ortho spine f/up noted.  Pain control through Dilaudid PCA  ALIF on tuesday    DM II:  Lantus/Humalog/FSSS  Endo f/up noted.    Lt shoulder pain:  X Ray Lt shouder      HTN:  Cw lasix/Losartan    CKD IV:  BMP/Cr stable

## 2019-09-01 NOTE — PROGRESS NOTE ADULT - SUBJECTIVE AND OBJECTIVE BOX
Plastic Surgery    SUBJECTIVE: Pt feels well, comfortable with adequate pain control. Ambulating    VITALS  T(C): 36.8 (09-01-19 @ 08:00), Max: 37.1 (08-31-19 @ 16:17)  HR: 82 (09-01-19 @ 08:00) (80 - 91)  BP: 150/82 (09-01-19 @ 08:00) (133/80 - 161/75)  RR: 18 (09-01-19 @ 08:00) (18 - 18)  SpO2: 95% (09-01-19 @ 08:00) (95% - 98%)  CAPILLARY BLOOD GLUCOSE      POCT Blood Glucose.: 203 mg/dL (01 Sep 2019 12:35)  POCT Blood Glucose.: 135 mg/dL (01 Sep 2019 08:56)  POCT Blood Glucose.: 170 mg/dL (31 Aug 2019 22:13)  POCT Blood Glucose.: 85 mg/dL (31 Aug 2019 17:20)  POCT Blood Glucose.: 119 mg/dL (31 Aug 2019 13:07)      Is/Os    08-31 @ 07:01  -  09-01 @ 07:00  --------------------------------------------------------  IN:    Oral Fluid: 1050 mL  Total IN: 1050 mL    OUT:    Accordian: 20 mL    Accordian: 5 mL    Bulb: 50 mL    Indwelling Catheter - Urethral: 3175 mL  Total OUT: 3250 mL    Total NET: -2200 mL          PHYSICAL EXAM:   General: NAD, Lying in bed comfortably  Back: No collections, drain sites CDI, dressing CDI without strikethrough. Remaining ELBERT ss    MEDICATIONS (STANDING): atorvastatin 10 milliGRAM(s) Oral at bedtime  bisacodyl Suppository 10 milliGRAM(s) Rectal once  ceFAZolin   IVPB 2000 milliGRAM(s) IV Intermittent every 8 hours  cholecalciferol 2000 Unit(s) Oral daily  docusate sodium 100 milliGRAM(s) Oral three times a day  ferrous    sulfate 325 milliGRAM(s) Oral daily  finasteride 5 milliGRAM(s) Oral daily  furosemide    Tablet 20 milliGRAM(s) Oral every 24 hours  insulin glargine Injectable (LANTUS) 27 Unit(s) SubCutaneous at bedtime  insulin lispro (HumaLOG) corrective regimen sliding scale   SubCutaneous three times a day before meals  insulin lispro (HumaLOG) corrective regimen sliding scale   SubCutaneous at bedtime  insulin lispro Injectable (HumaLOG) 9 Unit(s) SubCutaneous three times a day with meals  losartan 25 milliGRAM(s) Oral daily  pantoprazole    Tablet 40 milliGRAM(s) Oral before breakfast  pregabalin 100 milliGRAM(s) Oral two times a day  senna 2 Tablet(s) Oral at bedtime  sodium chloride 0.9%. 1000 milliLiter(s) IV Continuous <Continuous>  tamsulosin 0.4 milliGRAM(s) Oral two times a day    MEDICATIONS (PRN):cyclobenzaprine 5 milliGRAM(s) Oral three times a day PRN Muscle Spasm  guaiFENesin    Syrup 100 milliGRAM(s) Oral every 6 hours PRN congestion  magnesium hydroxide Suspension 30 milliLiter(s) Oral daily PRN Constipation  ondansetron Injectable 4 milliGRAM(s) IV Push every 6 hours PRN Nausea  oxyCODONE    IR 10 milliGRAM(s) Oral every 4 hours PRN Severe Pain (7 - 10)  oxyCODONE    IR 5 milliGRAM(s) Oral every 4 hours PRN Moderate Pain (4 - 6)  polyethylene glycol 3350 17 Gram(s) Oral daily PRN Constipation  saline laxative (FLEET) Rectal Enema 1 Enema Rectal once PRN constipation  traMADol 50 milliGRAM(s) Oral every 4 hours PRN Mild Pain (1 - 3)

## 2019-09-01 NOTE — PROGRESS NOTE ADULT - SUBJECTIVE AND OBJECTIVE BOX
POST OPERATIVE DAY #:  [5]     Patient Resting bedside with complaints this AM with continued BLE radicular pain however reports pain level not progressing.  No acute events overnight.  T(C): 36.9 (09-01-19 @ 04:42), Max: 37.1 (08-31-19 @ 16:17)  HR: 87 (09-01-19 @ 04:42) (80 - 91)  BP: 150/88 (09-01-19 @ 04:42) (133/80 - 161/75)  RR: 18 (09-01-19 @ 04:42) (18 - 18)  SpO2: 97% (09-01-19 @ 04:42) (95% - 98%)  Wt(kg): --  Exam:   Alert/Oriented, No Acute Distress             Dressing: Aquacel Dressing, distal portion of dressing rolling proximally, reinforced with gauze and tegaderm.  Prior Hemovac drain site dressing C/D/I         Drains: x1 Taco Crandall Drain in place with mild draining noted. 20cc/20cc         Sensation: [ ] intact to light touch  [ ] decreased:          Motor exam: [x ]                 [x] Lower extremity           PF          DF         EHL       FHL                                                                                   R        5/5        5/5        5/5       5/5                                               L         5/5        5/5        5/5       5/5                                                                  Calves Soft/Non-tender bilaterally          +DP pulses             LABS:

## 2019-09-01 NOTE — PROGRESS NOTE ADULT - ASSESSMENT
A/P :  68y Male s/p T11-S1 lami/fusion, VSS, NAD  -    Pain control  -    Plastic Surgery Team following patient for daily dressing and incision care  -    F/U L Shoulder X-Ray  -    Continue with Ancef until drains discharged as per Dr. Koehler  -    Patient scheduled for ALIF procedure with Dr. Koehler on 9/3/19  -    DVT ppx: SCDs       -    Physical Therapy  -    Weight bearing status: WBAT [x]           Christoph Roy PA-C  Team Pager: [phone]

## 2019-09-02 ENCOUNTER — TRANSCRIPTION ENCOUNTER (OUTPATIENT)
Age: 69
End: 2019-09-02

## 2019-09-02 LAB
GLUCOSE BLDC GLUCOMTR-MCNC: 135 MG/DL — HIGH (ref 70–99)
GLUCOSE BLDC GLUCOMTR-MCNC: 148 MG/DL — HIGH (ref 70–99)
GLUCOSE BLDC GLUCOMTR-MCNC: 159 MG/DL — HIGH (ref 70–99)
GLUCOSE BLDC GLUCOMTR-MCNC: 175 MG/DL — HIGH (ref 70–99)
INR BLD: 1.13 RATIO — SIGNIFICANT CHANGE UP (ref 0.88–1.16)
PROTHROM AB SERPL-ACNC: 12.9 SEC — SIGNIFICANT CHANGE UP (ref 10–13.1)

## 2019-09-02 RX ORDER — SODIUM CHLORIDE 9 MG/ML
1000 INJECTION INTRAMUSCULAR; INTRAVENOUS; SUBCUTANEOUS
Refills: 0 | Status: DISCONTINUED | OUTPATIENT
Start: 2019-09-03 | End: 2019-09-03

## 2019-09-02 RX ORDER — CEFAZOLIN SODIUM 1 G
2000 VIAL (EA) INJECTION EVERY 8 HOURS
Refills: 0 | Status: DISCONTINUED | OUTPATIENT
Start: 2019-09-02 | End: 2019-09-03

## 2019-09-02 RX ADMIN — TAMSULOSIN HYDROCHLORIDE 0.4 MILLIGRAM(S): 0.4 CAPSULE ORAL at 06:02

## 2019-09-02 RX ADMIN — PANTOPRAZOLE SODIUM 40 MILLIGRAM(S): 20 TABLET, DELAYED RELEASE ORAL at 06:02

## 2019-09-02 RX ADMIN — SENNA PLUS 2 TABLET(S): 8.6 TABLET ORAL at 22:16

## 2019-09-02 RX ADMIN — Medication 1: at 16:38

## 2019-09-02 RX ADMIN — Medication 325 MILLIGRAM(S): at 12:36

## 2019-09-02 RX ADMIN — OXYCODONE HYDROCHLORIDE 10 MILLIGRAM(S): 5 TABLET ORAL at 22:50

## 2019-09-02 RX ADMIN — TRAMADOL HYDROCHLORIDE 50 MILLIGRAM(S): 50 TABLET ORAL at 13:10

## 2019-09-02 RX ADMIN — Medication 100 MILLIGRAM(S): at 06:02

## 2019-09-02 RX ADMIN — Medication 9 UNIT(S): at 08:33

## 2019-09-02 RX ADMIN — TRAMADOL HYDROCHLORIDE 50 MILLIGRAM(S): 50 TABLET ORAL at 12:36

## 2019-09-02 RX ADMIN — Medication 9 UNIT(S): at 16:37

## 2019-09-02 RX ADMIN — Medication 20 MILLIGRAM(S): at 06:02

## 2019-09-02 RX ADMIN — Medication 100 MILLIGRAM(S): at 22:15

## 2019-09-02 RX ADMIN — INSULIN GLARGINE 27 UNIT(S): 100 INJECTION, SOLUTION SUBCUTANEOUS at 22:15

## 2019-09-02 RX ADMIN — OXYCODONE HYDROCHLORIDE 10 MILLIGRAM(S): 5 TABLET ORAL at 12:37

## 2019-09-02 RX ADMIN — Medication 100 MILLIGRAM(S): at 06:06

## 2019-09-02 RX ADMIN — FINASTERIDE 5 MILLIGRAM(S): 5 TABLET, FILM COATED ORAL at 12:36

## 2019-09-02 RX ADMIN — Medication 100 MILLIGRAM(S): at 14:46

## 2019-09-02 RX ADMIN — Medication 100 MILLIGRAM(S): at 14:47

## 2019-09-02 RX ADMIN — TAMSULOSIN HYDROCHLORIDE 0.4 MILLIGRAM(S): 0.4 CAPSULE ORAL at 18:10

## 2019-09-02 RX ADMIN — OXYCODONE HYDROCHLORIDE 10 MILLIGRAM(S): 5 TABLET ORAL at 22:18

## 2019-09-02 RX ADMIN — OXYCODONE HYDROCHLORIDE 10 MILLIGRAM(S): 5 TABLET ORAL at 07:45

## 2019-09-02 RX ADMIN — OXYCODONE HYDROCHLORIDE 10 MILLIGRAM(S): 5 TABLET ORAL at 18:45

## 2019-09-02 RX ADMIN — LOSARTAN POTASSIUM 25 MILLIGRAM(S): 100 TABLET, FILM COATED ORAL at 06:02

## 2019-09-02 RX ADMIN — Medication 9 UNIT(S): at 12:56

## 2019-09-02 RX ADMIN — OXYCODONE HYDROCHLORIDE 10 MILLIGRAM(S): 5 TABLET ORAL at 13:10

## 2019-09-02 RX ADMIN — Medication 2000 UNIT(S): at 12:36

## 2019-09-02 RX ADMIN — ATORVASTATIN CALCIUM 10 MILLIGRAM(S): 80 TABLET, FILM COATED ORAL at 22:15

## 2019-09-02 RX ADMIN — OXYCODONE HYDROCHLORIDE 10 MILLIGRAM(S): 5 TABLET ORAL at 07:04

## 2019-09-02 RX ADMIN — OXYCODONE HYDROCHLORIDE 10 MILLIGRAM(S): 5 TABLET ORAL at 18:11

## 2019-09-02 RX ADMIN — Medication 100 MILLIGRAM(S): at 15:32

## 2019-09-02 RX ADMIN — Medication 100 MILLIGRAM(S): at 18:10

## 2019-09-02 NOTE — PROGRESS NOTE ADULT - ASSESSMENT
A/P: 67 y/o M s/p T11-S1 Lami/Fusion 8/27/19     WBAT  ELBERT as per Plastics  Continue ancef until ELBERT removed  Pain management prn  GI ppx  Peters to gravity  Possible ALIF 9/3/19      IMMANUEL Eric  Orthopedic Surgery  [phone]

## 2019-09-02 NOTE — PROGRESS NOTE ADULT - SUBJECTIVE AND OBJECTIVE BOX
Plastic Surgery Progress Note (pg [facility]: [id number], [facility]: [phone])    SUBJECTIVE:  The patient was seen and examined. No acute events overnight. Pain controlled.    OBJECTIVE:     ** VITAL SIGNS / I&O's **    Vital Signs Last 24 Hrs  T(C): 37.3 (02 Sep 2019 09:19), Max: 37.6 (01 Sep 2019 21:44)  T(F): 99.2 (02 Sep 2019 09:19), Max: 99.7 (01 Sep 2019 21:44)  HR: 97 (02 Sep 2019 09:19) (85 - 97)  BP: 108/68 (02 Sep 2019 09:19) (108/68 - 156/89)  BP(mean): --  RR: 18 (02 Sep 2019 09:19) (18 - 18)  SpO2: 97% (02 Sep 2019 09:19) (95% - 98%)      01 Sep 2019 07:01  -  02 Sep 2019 07:00  --------------------------------------------------------  IN:    IV PiggyBack: 250 mL    Oral Fluid: 1180 mL  Total IN: 1430 mL    OUT:    Bulb: 90 mL    Indwelling Catheter - Urethral: 3575 mL  Total OUT: 3665 mL    Total NET: -2235 mL      02 Sep 2019 07:01  -  02 Sep 2019 11:35  --------------------------------------------------------  IN:    Oral Fluid: 320 mL  Total IN: 320 mL    OUT:    Indwelling Catheter - Urethral: 600 mL  Total OUT: 600 mL    Total NET: -280 mL          ** PHYSICAL EXAM **      General: NAD, Lying in bed comfortably  Back: No collections, drain sites CDI, dressing CDI without strikethrough. Remaining ELBERT ss    ** LABS **            PT/INR - ( 02 Sep 2019 09:05 )   PT: 12.9 sec;   INR: 1.13 ratio           CAPILLARY BLOOD GLUCOSE      POCT Blood Glucose.: 135 mg/dL (02 Sep 2019 08:01)  POCT Blood Glucose.: 145 mg/dL (01 Sep 2019 22:15)  POCT Blood Glucose.: 120 mg/dL (01 Sep 2019 18:38)  POCT Blood Glucose.: 203 mg/dL (01 Sep 2019 12:35)          Recent Cultures:        ** MEDICATIONS **  MEDICATIONS  (STANDING):  atorvastatin 10 milliGRAM(s) Oral at bedtime  bisacodyl Suppository 10 milliGRAM(s) Rectal once  ceFAZolin   IVPB 2000 milliGRAM(s) IV Intermittent every 8 hours  cholecalciferol 2000 Unit(s) Oral daily  docusate sodium 100 milliGRAM(s) Oral three times a day  ferrous    sulfate 325 milliGRAM(s) Oral daily  finasteride 5 milliGRAM(s) Oral daily  furosemide    Tablet 20 milliGRAM(s) Oral every 24 hours  insulin glargine Injectable (LANTUS) 27 Unit(s) SubCutaneous at bedtime  insulin lispro (HumaLOG) corrective regimen sliding scale   SubCutaneous three times a day before meals  insulin lispro (HumaLOG) corrective regimen sliding scale   SubCutaneous at bedtime  insulin lispro Injectable (HumaLOG) 9 Unit(s) SubCutaneous three times a day with meals  losartan 25 milliGRAM(s) Oral daily  pantoprazole    Tablet 40 milliGRAM(s) Oral before breakfast  pregabalin 100 milliGRAM(s) Oral two times a day  senna 2 Tablet(s) Oral at bedtime  tamsulosin 0.4 milliGRAM(s) Oral two times a day    MEDICATIONS  (PRN):  cyclobenzaprine 5 milliGRAM(s) Oral three times a day PRN Muscle Spasm  guaiFENesin    Syrup 100 milliGRAM(s) Oral every 6 hours PRN congestion  magnesium hydroxide Suspension 30 milliLiter(s) Oral daily PRN Constipation  ondansetron Injectable 4 milliGRAM(s) IV Push every 6 hours PRN Nausea  oxyCODONE    IR 10 milliGRAM(s) Oral every 4 hours PRN Severe Pain (7 - 10)  oxyCODONE    IR 5 milliGRAM(s) Oral every 4 hours PRN Moderate Pain (4 - 6)  polyethylene glycol 3350 17 Gram(s) Oral daily PRN Constipation  saline laxative (FLEET) Rectal Enema 1 Enema Rectal once PRN constipation  traMADol 50 milliGRAM(s) Oral every 4 hours PRN Mild Pain (1 - 3)

## 2019-09-02 NOTE — PROGRESS NOTE ADULT - ASSESSMENT
68M POD5 from T11-S1 Lami/Fusion and back wound closure. Recovering appropriately on floor, Drains and dressing with appropriate output and appearance    - Continue dressing  - continue drain  - Care per primary, Orthopedic surgery    Plastic surgery  [phone]

## 2019-09-02 NOTE — PROGRESS NOTE ADULT - SUBJECTIVE AND OBJECTIVE BOX
Patient reports pain improved today. No events overnight.  Denies cp, sob, palpitations, fever, chills.    T(C): 37.3 (09-02-19 @ 05:10), Max: 37.6 (09-01-19 @ 21:44)  HR: 94 (09-02-19 @ 05:10) (85 - 94)  BP: 140/84 (09-02-19 @ 05:10) (109/66 - 156/89)  RR: 18 (09-02-19 @ 05:10) (18 - 18)  SpO2: 97% (09-02-19 @ 05:10) (95% - 98%)  ELBERT: 25cc/12H       90cc/24H      PHYSICAL EXAM:  NAD, Alert and oriented  Back: Dressing C/D/I; ELBERT in place with serous drainage in bulb, dull sensation grossly intact to light touch; (+) Distal Pulses; No Calf tenderness B/L, PAS                [x] Lower extremity                         PF          DF         EHL       FHL                                                                                            R        5/5        5/5        5/5       5/5                                                        L         5/5        5/5        5/5       5/5        : +Peters to gravity

## 2019-09-02 NOTE — PROGRESS NOTE ADULT - ASSESSMENT
A/P: 68M s/p T11-S1 Lami/Fusion.    S/p T11-S1 Lami/Fusion:    Ortho spine f/up noted.  Pain control through Dilaudid PCA  ALIF on tuesday    DM II:  Lantus/Humalog/FSSS  Endo f/up noted.    Lt shoulder pain:  F/up with X Ray Lt shouder      HTN:  Cw lasix/Losartan    CKD IV:  BMP/Cr stable

## 2019-09-03 ENCOUNTER — APPOINTMENT (OUTPATIENT)
Dept: ORTHOPEDIC SURGERY | Facility: HOSPITAL | Age: 69
End: 2019-09-03

## 2019-09-03 ENCOUNTER — RESULT REVIEW (OUTPATIENT)
Age: 69
End: 2019-09-03

## 2019-09-03 LAB
ANION GAP SERPL CALC-SCNC: 13 MMOL/L — SIGNIFICANT CHANGE UP (ref 5–17)
ANION GAP SERPL CALC-SCNC: 15 MMOL/L — SIGNIFICANT CHANGE UP (ref 5–17)
APTT BLD: 31.4 SEC — SIGNIFICANT CHANGE UP (ref 27.5–36.3)
BASOPHILS # BLD AUTO: 0 K/UL — SIGNIFICANT CHANGE UP (ref 0–0.2)
BASOPHILS NFR BLD AUTO: 0.1 % — SIGNIFICANT CHANGE UP (ref 0–2)
BLD GP AB SCN SERPL QL: NEGATIVE — SIGNIFICANT CHANGE UP
BUN SERPL-MCNC: 45 MG/DL — HIGH (ref 7–23)
BUN SERPL-MCNC: 54 MG/DL — HIGH (ref 7–23)
CALCIUM SERPL-MCNC: 7 MG/DL — LOW (ref 8.4–10.5)
CALCIUM SERPL-MCNC: 8.5 MG/DL — SIGNIFICANT CHANGE UP (ref 8.4–10.5)
CHLORIDE SERPL-SCNC: 105 MMOL/L — SIGNIFICANT CHANGE UP (ref 96–108)
CHLORIDE SERPL-SCNC: 98 MMOL/L — SIGNIFICANT CHANGE UP (ref 96–108)
CO2 SERPL-SCNC: 16 MMOL/L — LOW (ref 22–31)
CO2 SERPL-SCNC: 20 MMOL/L — LOW (ref 22–31)
CREAT SERPL-MCNC: 1.98 MG/DL — HIGH (ref 0.5–1.3)
CREAT SERPL-MCNC: 2.23 MG/DL — HIGH (ref 0.5–1.3)
EOSINOPHIL # BLD AUTO: 0 K/UL — SIGNIFICANT CHANGE UP (ref 0–0.5)
EOSINOPHIL NFR BLD AUTO: 0.3 % — SIGNIFICANT CHANGE UP (ref 0–6)
GAS PNL BLDA: SIGNIFICANT CHANGE UP
GAS PNL BLDA: SIGNIFICANT CHANGE UP
GLUCOSE BLDC GLUCOMTR-MCNC: 192 MG/DL — HIGH (ref 70–99)
GLUCOSE BLDC GLUCOMTR-MCNC: 272 MG/DL — HIGH (ref 70–99)
GLUCOSE SERPL-MCNC: 117 MG/DL — HIGH (ref 70–99)
GLUCOSE SERPL-MCNC: 192 MG/DL — HIGH (ref 70–99)
HCT VFR BLD CALC: 23.2 % — LOW (ref 39–50)
HCT VFR BLD CALC: 31.3 % — LOW (ref 39–50)
HGB BLD-MCNC: 10.6 G/DL — LOW (ref 13–17)
HGB BLD-MCNC: 8 G/DL — LOW (ref 13–17)
INR BLD: 1.21 RATIO — HIGH (ref 0.88–1.16)
LYMPHOCYTES # BLD AUTO: 0.8 K/UL — LOW (ref 1–3.3)
LYMPHOCYTES # BLD AUTO: 5.4 % — LOW (ref 13–44)
MCHC RBC-ENTMCNC: 29.6 PG — SIGNIFICANT CHANGE UP (ref 27–34)
MCHC RBC-ENTMCNC: 30 PG — SIGNIFICANT CHANGE UP (ref 27–34)
MCHC RBC-ENTMCNC: 33.8 GM/DL — SIGNIFICANT CHANGE UP (ref 32–36)
MCHC RBC-ENTMCNC: 34.3 GM/DL — SIGNIFICANT CHANGE UP (ref 32–36)
MCV RBC AUTO: 87.5 FL — SIGNIFICANT CHANGE UP (ref 80–100)
MCV RBC AUTO: 87.5 FL — SIGNIFICANT CHANGE UP (ref 80–100)
MONOCYTES # BLD AUTO: 1.2 K/UL — HIGH (ref 0–0.9)
MONOCYTES NFR BLD AUTO: 8.3 % — SIGNIFICANT CHANGE UP (ref 2–14)
NEUTROPHILS # BLD AUTO: 11.9 K/UL — HIGH (ref 1.8–7.4)
NEUTROPHILS NFR BLD AUTO: 85.8 % — HIGH (ref 43–77)
PLAT MORPH BLD: NORMAL — SIGNIFICANT CHANGE UP
PLATELET # BLD AUTO: 222 K/UL — SIGNIFICANT CHANGE UP (ref 150–400)
PLATELET # BLD AUTO: 260 K/UL — SIGNIFICANT CHANGE UP (ref 150–400)
POTASSIUM SERPL-MCNC: 4.2 MMOL/L — SIGNIFICANT CHANGE UP (ref 3.5–5.3)
POTASSIUM SERPL-MCNC: 4.8 MMOL/L — SIGNIFICANT CHANGE UP (ref 3.5–5.3)
POTASSIUM SERPL-SCNC: 4.2 MMOL/L — SIGNIFICANT CHANGE UP (ref 3.5–5.3)
POTASSIUM SERPL-SCNC: 4.8 MMOL/L — SIGNIFICANT CHANGE UP (ref 3.5–5.3)
PROTHROM AB SERPL-ACNC: 14 SEC — HIGH (ref 10–12.9)
RBC # BLD: 2.66 M/UL — LOW (ref 4.2–5.8)
RBC # BLD: 3.58 M/UL — LOW (ref 4.2–5.8)
RBC # FLD: 11.7 % — SIGNIFICANT CHANGE UP (ref 10.3–14.5)
RBC # FLD: 13.3 % — SIGNIFICANT CHANGE UP (ref 10.3–14.5)
RBC BLD AUTO: NORMAL — SIGNIFICANT CHANGE UP
RH IG SCN BLD-IMP: POSITIVE — SIGNIFICANT CHANGE UP
SODIUM SERPL-SCNC: 131 MMOL/L — LOW (ref 135–145)
SODIUM SERPL-SCNC: 136 MMOL/L — SIGNIFICANT CHANGE UP (ref 135–145)
WBC # BLD: 11.2 K/UL — HIGH (ref 3.8–10.5)
WBC # BLD: 13.9 K/UL — HIGH (ref 3.8–10.5)
WBC # FLD AUTO: 11.2 K/UL — HIGH (ref 3.8–10.5)
WBC # FLD AUTO: 13.9 K/UL — HIGH (ref 3.8–10.5)

## 2019-09-03 PROCEDURE — 88311 DECALCIFY TISSUE: CPT | Mod: 26

## 2019-09-03 PROCEDURE — 22558 ARTHRD ANT NTRBD MIN DSC LUM: CPT | Mod: 58

## 2019-09-03 PROCEDURE — 88304 TISSUE EXAM BY PATHOLOGIST: CPT | Mod: 26

## 2019-09-03 PROCEDURE — 22853 INSJ BIOMECHANICAL DEVICE: CPT | Mod: 58

## 2019-09-03 PROCEDURE — 49010 EXPLORATION BEHIND ABDOMEN: CPT | Mod: GC

## 2019-09-03 PROCEDURE — 22585 ARTHRD ANT NTRBD MIN DSC EA: CPT | Mod: 58

## 2019-09-03 PROCEDURE — 99232 SBSQ HOSP IP/OBS MODERATE 35: CPT

## 2019-09-03 PROCEDURE — 22845 INSERT SPINE FIXATION DEVICE: CPT | Mod: 58,59

## 2019-09-03 PROCEDURE — 35761: CPT | Mod: GC

## 2019-09-03 RX ORDER — HYDROMORPHONE HYDROCHLORIDE 2 MG/ML
0.5 INJECTION INTRAMUSCULAR; INTRAVENOUS; SUBCUTANEOUS
Refills: 0 | Status: DISCONTINUED | OUTPATIENT
Start: 2019-09-03 | End: 2019-09-03

## 2019-09-03 RX ORDER — CYCLOBENZAPRINE HYDROCHLORIDE 10 MG/1
5 TABLET, FILM COATED ORAL THREE TIMES A DAY
Refills: 0 | Status: DISCONTINUED | OUTPATIENT
Start: 2019-09-03 | End: 2019-09-11

## 2019-09-03 RX ORDER — ONDANSETRON 8 MG/1
4 TABLET, FILM COATED ORAL ONCE
Refills: 0 | Status: DISCONTINUED | OUTPATIENT
Start: 2019-09-03 | End: 2019-09-03

## 2019-09-03 RX ORDER — CHOLECALCIFEROL (VITAMIN D3) 125 MCG
2000 CAPSULE ORAL DAILY
Refills: 0 | Status: DISCONTINUED | OUTPATIENT
Start: 2019-09-03 | End: 2019-09-11

## 2019-09-03 RX ORDER — INSULIN LISPRO 100/ML
7 VIAL (ML) SUBCUTANEOUS
Refills: 0 | Status: DISCONTINUED | OUTPATIENT
Start: 2019-09-04 | End: 2019-09-04

## 2019-09-03 RX ORDER — FERROUS SULFATE 325(65) MG
325 TABLET ORAL DAILY
Refills: 0 | Status: DISCONTINUED | OUTPATIENT
Start: 2019-09-03 | End: 2019-09-11

## 2019-09-03 RX ORDER — OXYCODONE HYDROCHLORIDE 5 MG/1
5 TABLET ORAL EVERY 4 HOURS
Refills: 0 | Status: DISCONTINUED | OUTPATIENT
Start: 2019-09-03 | End: 2019-09-03

## 2019-09-03 RX ORDER — DIAZEPAM 5 MG
5 TABLET ORAL EVERY 12 HOURS
Refills: 0 | Status: DISCONTINUED | OUTPATIENT
Start: 2019-09-03 | End: 2019-09-10

## 2019-09-03 RX ORDER — FINASTERIDE 5 MG/1
5 TABLET, FILM COATED ORAL DAILY
Refills: 0 | Status: DISCONTINUED | OUTPATIENT
Start: 2019-09-03 | End: 2019-09-11

## 2019-09-03 RX ORDER — INSULIN GLARGINE 100 [IU]/ML
27 INJECTION, SOLUTION SUBCUTANEOUS AT BEDTIME
Refills: 0 | Status: DISCONTINUED | OUTPATIENT
Start: 2019-09-03 | End: 2019-09-03

## 2019-09-03 RX ORDER — HYDROMORPHONE HYDROCHLORIDE 2 MG/ML
0.5 INJECTION INTRAMUSCULAR; INTRAVENOUS; SUBCUTANEOUS EVERY 6 HOURS
Refills: 0 | Status: DISCONTINUED | OUTPATIENT
Start: 2019-09-03 | End: 2019-09-10

## 2019-09-03 RX ORDER — ONDANSETRON 8 MG/1
4 TABLET, FILM COATED ORAL EVERY 6 HOURS
Refills: 0 | Status: DISCONTINUED | OUTPATIENT
Start: 2019-09-03 | End: 2019-09-11

## 2019-09-03 RX ORDER — MAGNESIUM HYDROXIDE 400 MG/1
30 TABLET, CHEWABLE ORAL DAILY
Refills: 0 | Status: DISCONTINUED | OUTPATIENT
Start: 2019-09-03 | End: 2019-09-11

## 2019-09-03 RX ORDER — ATORVASTATIN CALCIUM 80 MG/1
10 TABLET, FILM COATED ORAL AT BEDTIME
Refills: 0 | Status: DISCONTINUED | OUTPATIENT
Start: 2019-09-03 | End: 2019-09-11

## 2019-09-03 RX ORDER — POLYETHYLENE GLYCOL 3350 17 G/17G
17 POWDER, FOR SOLUTION ORAL DAILY
Refills: 0 | Status: DISCONTINUED | OUTPATIENT
Start: 2019-09-03 | End: 2019-09-11

## 2019-09-03 RX ORDER — INSULIN LISPRO 100/ML
VIAL (ML) SUBCUTANEOUS
Refills: 0 | Status: DISCONTINUED | OUTPATIENT
Start: 2019-09-03 | End: 2019-09-11

## 2019-09-03 RX ORDER — PANTOPRAZOLE SODIUM 20 MG/1
40 TABLET, DELAYED RELEASE ORAL
Refills: 0 | Status: DISCONTINUED | OUTPATIENT
Start: 2019-09-03 | End: 2019-09-11

## 2019-09-03 RX ORDER — INSULIN LISPRO 100/ML
VIAL (ML) SUBCUTANEOUS AT BEDTIME
Refills: 0 | Status: DISCONTINUED | OUTPATIENT
Start: 2019-09-03 | End: 2019-09-11

## 2019-09-03 RX ORDER — ACETAMINOPHEN 500 MG
650 TABLET ORAL EVERY 6 HOURS
Refills: 0 | Status: DISCONTINUED | OUTPATIENT
Start: 2019-09-03 | End: 2019-09-11

## 2019-09-03 RX ORDER — FUROSEMIDE 40 MG
20 TABLET ORAL EVERY 24 HOURS
Refills: 0 | Status: DISCONTINUED | OUTPATIENT
Start: 2019-09-03 | End: 2019-09-11

## 2019-09-03 RX ORDER — TRAMADOL HYDROCHLORIDE 50 MG/1
50 TABLET ORAL EVERY 4 HOURS
Refills: 0 | Status: DISCONTINUED | OUTPATIENT
Start: 2019-09-03 | End: 2019-09-04

## 2019-09-03 RX ORDER — CEFAZOLIN SODIUM 1 G
2000 VIAL (EA) INJECTION ONCE
Refills: 0 | Status: DISCONTINUED | OUTPATIENT
Start: 2019-09-03 | End: 2019-09-03

## 2019-09-03 RX ORDER — LOSARTAN POTASSIUM 100 MG/1
25 TABLET, FILM COATED ORAL DAILY
Refills: 0 | Status: DISCONTINUED | OUTPATIENT
Start: 2019-09-03 | End: 2019-09-11

## 2019-09-03 RX ORDER — CEFAZOLIN SODIUM 1 G
2000 VIAL (EA) INJECTION EVERY 8 HOURS
Refills: 0 | Status: DISCONTINUED | OUTPATIENT
Start: 2019-09-03 | End: 2019-09-03

## 2019-09-03 RX ORDER — SENNA PLUS 8.6 MG/1
2 TABLET ORAL AT BEDTIME
Refills: 0 | Status: DISCONTINUED | OUTPATIENT
Start: 2019-09-03 | End: 2019-09-11

## 2019-09-03 RX ORDER — TAMSULOSIN HYDROCHLORIDE 0.4 MG/1
0.4 CAPSULE ORAL
Refills: 0 | Status: DISCONTINUED | OUTPATIENT
Start: 2019-09-03 | End: 2019-09-11

## 2019-09-03 RX ORDER — SODIUM CHLORIDE 9 MG/ML
1000 INJECTION, SOLUTION INTRAVENOUS
Refills: 0 | Status: DISCONTINUED | OUTPATIENT
Start: 2019-09-03 | End: 2019-09-09

## 2019-09-03 RX ORDER — INSULIN GLARGINE 100 [IU]/ML
25 INJECTION, SOLUTION SUBCUTANEOUS AT BEDTIME
Refills: 0 | Status: DISCONTINUED | OUTPATIENT
Start: 2019-09-03 | End: 2019-09-06

## 2019-09-03 RX ORDER — OXYCODONE HYDROCHLORIDE 5 MG/1
10 TABLET ORAL EVERY 4 HOURS
Refills: 0 | Status: DISCONTINUED | OUTPATIENT
Start: 2019-09-03 | End: 2019-09-10

## 2019-09-03 RX ORDER — CEFAZOLIN SODIUM 1 G
2000 VIAL (EA) INJECTION EVERY 8 HOURS
Refills: 0 | Status: DISCONTINUED | OUTPATIENT
Start: 2019-09-03 | End: 2019-09-06

## 2019-09-03 RX ORDER — CEFAZOLIN SODIUM 1 G
VIAL (EA) INJECTION
Refills: 0 | Status: DISCONTINUED | OUTPATIENT
Start: 2019-09-03 | End: 2019-09-03

## 2019-09-03 RX ORDER — DOCUSATE SODIUM 100 MG
100 CAPSULE ORAL THREE TIMES A DAY
Refills: 0 | Status: DISCONTINUED | OUTPATIENT
Start: 2019-09-03 | End: 2019-09-11

## 2019-09-03 RX ADMIN — HYDROMORPHONE HYDROCHLORIDE 0.5 MILLIGRAM(S): 2 INJECTION INTRAMUSCULAR; INTRAVENOUS; SUBCUTANEOUS at 18:27

## 2019-09-03 RX ADMIN — OXYCODONE HYDROCHLORIDE 10 MILLIGRAM(S): 5 TABLET ORAL at 23:00

## 2019-09-03 RX ADMIN — Medication 1: at 17:55

## 2019-09-03 RX ADMIN — HYDROMORPHONE HYDROCHLORIDE 0.5 MILLIGRAM(S): 2 INJECTION INTRAMUSCULAR; INTRAVENOUS; SUBCUTANEOUS at 17:25

## 2019-09-03 RX ADMIN — Medication 100 MILLIGRAM(S): at 21:45

## 2019-09-03 RX ADMIN — SODIUM CHLORIDE 125 MILLILITER(S): 9 INJECTION, SOLUTION INTRAVENOUS at 18:01

## 2019-09-03 RX ADMIN — Medication 100 MILLIGRAM(S): at 05:29

## 2019-09-03 RX ADMIN — CYCLOBENZAPRINE HYDROCHLORIDE 5 MILLIGRAM(S): 10 TABLET, FILM COATED ORAL at 23:48

## 2019-09-03 RX ADMIN — SODIUM CHLORIDE 100 MILLILITER(S): 9 INJECTION INTRAMUSCULAR; INTRAVENOUS; SUBCUTANEOUS at 00:02

## 2019-09-03 RX ADMIN — SENNA PLUS 2 TABLET(S): 8.6 TABLET ORAL at 21:45

## 2019-09-03 RX ADMIN — INSULIN GLARGINE 25 UNIT(S): 100 INJECTION, SOLUTION SUBCUTANEOUS at 22:07

## 2019-09-03 RX ADMIN — LOSARTAN POTASSIUM 25 MILLIGRAM(S): 100 TABLET, FILM COATED ORAL at 05:29

## 2019-09-03 RX ADMIN — Medication 1: at 22:05

## 2019-09-03 RX ADMIN — LOSARTAN POTASSIUM 25 MILLIGRAM(S): 100 TABLET, FILM COATED ORAL at 21:46

## 2019-09-03 RX ADMIN — HYDROMORPHONE HYDROCHLORIDE 0.5 MILLIGRAM(S): 2 INJECTION INTRAMUSCULAR; INTRAVENOUS; SUBCUTANEOUS at 17:45

## 2019-09-03 RX ADMIN — Medication 20 MILLIGRAM(S): at 05:29

## 2019-09-03 RX ADMIN — TAMSULOSIN HYDROCHLORIDE 0.4 MILLIGRAM(S): 0.4 CAPSULE ORAL at 05:29

## 2019-09-03 RX ADMIN — ATORVASTATIN CALCIUM 10 MILLIGRAM(S): 80 TABLET, FILM COATED ORAL at 21:46

## 2019-09-03 RX ADMIN — HYDROMORPHONE HYDROCHLORIDE 0.5 MILLIGRAM(S): 2 INJECTION INTRAMUSCULAR; INTRAVENOUS; SUBCUTANEOUS at 18:11

## 2019-09-03 RX ADMIN — SODIUM CHLORIDE 125 MILLILITER(S): 9 INJECTION, SOLUTION INTRAVENOUS at 22:08

## 2019-09-03 RX ADMIN — OXYCODONE HYDROCHLORIDE 10 MILLIGRAM(S): 5 TABLET ORAL at 22:18

## 2019-09-03 RX ADMIN — PANTOPRAZOLE SODIUM 40 MILLIGRAM(S): 20 TABLET, DELAYED RELEASE ORAL at 05:29

## 2019-09-03 RX ADMIN — TAMSULOSIN HYDROCHLORIDE 0.4 MILLIGRAM(S): 0.4 CAPSULE ORAL at 21:46

## 2019-09-03 NOTE — BRIEF OPERATIVE NOTE - NSICDXBRIEFPROCEDURE_GEN_ALL_CORE_FT
PROCEDURES:  Closure of surgical wound of back 27-Aug-2019 17:54:18  Bassam Lea
PROCEDURES:  ALIF (anterior lumbar interbody fusion) 03-Sep-2019 15:57:23  Lion Rayo
PROCEDURES:  Fusion of 7 to 12 spinal segments by posterior approach 27-Aug-2019 18:40:22  Christoph Roy  Revision, laminectomy, lumbar, 6 levels 27-Aug-2019 18:39:35  Christoph Roy  Re-exploration, laminectomy site, lumbar 27-Aug-2019 18:38:48  Christoph Roy

## 2019-09-03 NOTE — BRIEF OPERATIVE NOTE - OPERATION/FINDINGS
Closed with fascia closed with O maxon, 2-0 PDS on Ct-1 for subdermal layer, and 3-O monocryl for running subcuticular closure. 2x hemovac drains subfascially and 19Fr Hugo drain subcutaneously. Dressed with aquacel.
L4-5 and L5-S1 Anterior lumbar interbody fusion
Removed prior Hardware

## 2019-09-03 NOTE — BRIEF OPERATIVE NOTE - CONDITION POST OP
"Joanna Martins CMA    "
Stable

## 2019-09-03 NOTE — CHART NOTE - NSCHARTNOTEFT_GEN_A_CORE
Patient Resting without complaints.  No Chest Pain, SOB, N/V.  Pre op s/sx: BLE Radiculopathy R>L; Post op, patient reports: Decreased BLE radicular pain noticed.  T(C): 36.6 (09-03-19 @ 16:25), Max: 37.3 (09-03-19 @ 04:07)  HR: 98 (09-03-19 @ 19:00) (88 - 100)  BP: 148/85 (09-03-19 @ 19:00) (109/73 - 164/89)  RR: 16 (09-03-19 @ 19:00) (16 - 18)  SpO2: 99% (09-03-19 @ 19:00) (95% - 100%)  Wt(kg): --  Exam:   Alert/Oriented, No Acute Distress  Cards: +S1/S2, RRR  Pulm: CTAB           Dressing: [x] clean/dry/intact Aquacel placed on abdomen           Drains: : No drains from todays procedure however still had ELBERT drain on lumbar region from procedure on 8/27/19 which is being followed by plastic surgery team.         Sensation: [x] intact to light touch           Motor exam: [x]                     [x] Lower extremity           PF          DF         EHL       FHL                                                                                  R        5/5        5/5        5/5       5/5                                             L         5/5        5/5        5/5       5/5                                                                  Calves Soft/Non-tender bilaterally           [x] warm well perfused; capillary refill <3 seconds              LABS:                        10.6   13.9  )-----------( 222      ( 03 Sep 2019 17:25 )             31.3     09-03    136  |  105  |  45<H>  ----------------------------<  192<H>  4.8   |  16<L>  |  1.98<H>    Ca    7.0<L>      03 Sep 2019 17:25          A/P :  68y Male s/p T11-S1 ALIF  -    Pain control  -    Incentive Spirometry  -    F/U AM Labs  -    DVT ppx: SCDs       -    Physical Therapy  -    Weight bearing status: WBAT [x]              Christoph Roy PA-C  Team Pager #[phone]

## 2019-09-03 NOTE — PROGRESS NOTE ADULT - ASSESSMENT
69 y/o M w/h/o uncontrolled  T2DM>on insulin basal/bolus PTA. DM c/b CAD> s/p CABG and  CKD3. H/o spinal stenosis s/p T10-L4 Laminectomy in 2016  now admitted fro revision of laminectomy  and spinal fusion on 8/27 and now requiring anterior lumbar interbody fusion today. Saw pt in PACU. Pt remains NPO with BG at goal while on present insulin doses prior to NPO but no BG values noted today except for BMP from this am with glucose of 117mg/dL. 67 y/o M w/h/o uncontrolled  T2DM>on insulin basal/bolus PTA. DM c/b CAD> s/p CABG and  CKD3. H/o spinal stenosis s/p T10-L4 Laminectomy in 2016  now admitted fro revision of laminectomy  and spinal fusion on 8/27 and now requiring anterior lumbar interbody fusion today. Saw pt in PACU. Pt remains under anesthesia effect unable to determine if he will be able to take any POs today. Will preventively decreased Lantus dose due to increased on creat levels. Will add premeal Humalog once taking POs.

## 2019-09-03 NOTE — PROGRESS NOTE ADULT - SUBJECTIVE AND OBJECTIVE BOX
Seen in PACU preop ~ 8:00 this am  Plan for ALIF L4-S1  Anterior exposure requested.  Case reviewed w Dr. Koehler  Avail images seen  Pt seen/ex'ed  +DP pulses (R>L)  Informed consent obtained from pt via [facility]  phone.

## 2019-09-03 NOTE — PROGRESS NOTE ADULT - ASSESSMENT
68M POD6 from T11-S1 Lami/Fusion and back wound closure. Recovering appropriately on floor, Drains and dressing with appropriate output and appearance    - Continue dressing  - continue drain  - Care per primary, Orthopedic surgery    Plastic surgery  [phone]

## 2019-09-03 NOTE — BRIEF OPERATIVE NOTE - NSICDXBRIEFPOSTOP_GEN_ALL_CORE_FT
POST-OP DIAGNOSIS:  Lumbar spinal stenosis 27-Aug-2019 18:41:22  Christoph Roy
POST-OP DIAGNOSIS:  Lumbar spinal stenosis 27-Aug-2019 18:41:22  Christoph Roy

## 2019-09-03 NOTE — PROGRESS NOTE ADULT - SUBJECTIVE AND OBJECTIVE BOX
Plastic Surgery     SUBJECTIVE:  The patient was seen and examined. No acute events overnight. Pain controlled.    OBJECTIVE:   Vital Signs Last 24 Hrs  T(C): 37.3 (03 Sep 2019 04:20), Max: 37.3 (03 Sep 2019 04:07)  T(F): 99.1 (03 Sep 2019 04:20), Max: 99.1 (03 Sep 2019 04:07)  HR: 88 (03 Sep 2019 04:20) (88 - 100)  BP: 133/81 (03 Sep 2019 04:20) (133/81 - 144/87)  BP(mean): --  RR: 18 (03 Sep 2019 04:20) (18 - 18)  SpO2: 97% (03 Sep 2019 04:20) (96% - 97%)    ** PHYSICAL EXAM **    General: NAD, Lying in bed comfortably  Back: No collections, drain sites CDI, dressing CDI without strikethrough. Remaining ELBERT ss

## 2019-09-03 NOTE — PROGRESS NOTE ADULT - PROBLEM SELECTOR PLAN 1
-Test BG q6h while NPO and ac and hs once back on diet.  -Decrease Lantus dose to 25 units tonight since creat level is up today.  -Restart Humalog 9 units ac meals if pt back on diet and eating'  -Continue low dose Humalog correction scales ac and hs  -Plan discussed with pt/team.  Contact info: [phone] (24/7). pager [phone] -Test BG q6h while NPO and ac and hs once back on diet.  -Decrease Lantus dose to 25 units tonight since creat level is up today.  -Humalog 7 units ac meals once able to take POs. Not today  -Continue low dose Humalog correction scales ac and hs  -Plan discussed with pt's daughter/teamPACU RN.  Contact info: [phone] (24/7). pager [phone]

## 2019-09-03 NOTE — BRIEF OPERATIVE NOTE - NSICDXBRIEFPREOP_GEN_ALL_CORE_FT
PRE-OP DIAGNOSIS:  Spinal stenosis of lumbar region with radiculopathy 27-Aug-2019 18:40:56  Christoph Roy
PRE-OP DIAGNOSIS:  Spinal stenosis of lumbar region with radiculopathy 27-Aug-2019 18:40:56  Christoph Roy

## 2019-09-03 NOTE — PROGRESS NOTE ADULT - SUBJECTIVE AND OBJECTIVE BOX
DIABETES FOLLOW UP NOTE:   INTERVAL HX: 69 y/o M w/h/o uncontrolled  T2DM>on insulin basal/bolus PTA. DM c/b CAD> s/p CABG and  CKD3. H/o spinal stenosis s/p T10-L4 Laminectomy in 2016  now admitted fro revision of laminectomy  and spinal fusion on 8/27 and now requiring anterior lumbar interbody fusion today. Saw pt in PACU. Pt remains NPO with BG at goal yesterday while on present insulin doses. No POC documented today.    Review of Systems:  General:   Cardiovascular: No chest pain, palpitations  Respiratory: No SOB, no cough  GI: No nausea, vomiting, abdominal pain  Endocrine: no polyuria, polydipsia or S&Sx of hypoglycemia    Allergies    No Known Allergies    Intolerances      MEDICATIONS:  atorvastatin 10 milliGRAM(s) Oral at bedtime  cholecalciferol 2000 Unit(s) Oral daily  finasteride 5 milliGRAM(s) Oral daily  insulin glargine Injectable (LANTUS) 27 Unit(s) SubCutaneous at bedtime  insulin lispro (HumaLOG) corrective regimen sliding scale   SubCutaneous three times a day before meals  insulin lispro (HumaLOG) corrective regimen sliding scale   SubCutaneous at bedtime    PHYSICAL EXAM:  VITALS: T(C): 36.6 (09-03-19 @ 16:25)  T(F): 97.9 (09-03-19 @ 16:25), Max: 99.1 (09-03-19 @ 04:07)  HR: 91 (09-03-19 @ 17:15) (88 - 100)  BP: 115/79 (09-03-19 @ 17:15) (109/73 - 144/87)  RR:  (16 - 18)  SpO2:  (95% - 100%)  Wt(kg): --  GENERAL: Male laying in bed in NAD  Abdomen: Soft, nontender, non distended  Extremities: Warm, no edema in all 4 exts  NEURO: A&O X3    LABS:  POCT Blood Glucose.: 159 mg/dL (09-02-19 @ 21:35)  POCT Blood Glucose.: 175 mg/dL (09-02-19 @ 16:31)  POCT Blood Glucose.: 148 mg/dL (09-02-19 @ 12:55)  POCT Blood Glucose.: 135 mg/dL (09-02-19 @ 08:01)  POCT Blood Glucose.: 145 mg/dL (09-01-19 @ 22:15)  POCT Blood Glucose.: 120 mg/dL (09-01-19 @ 18:38)  POCT Blood Glucose.: 203 mg/dL (09-01-19 @ 12:35)  POCT Blood Glucose.: 135 mg/dL (09-01-19 @ 08:56)  POCT Blood Glucose.: 170 mg/dL (08-31-19 @ 22:13)                            8.0    11.2  )-----------( 260      ( 03 Sep 2019 05:09 )             23.2       09-03    131<L>  |  98  |  54<H>  ----------------------------<  117<H>  4.2   |  20<L>  |  2.23<H>    EGFR if non : 29<L>    Ca    8.5      09-03        Thyroid Function Tests:      Hemoglobin A1C, Whole Blood: 9.4 % <H> [4.0 - 5.6] (08-14-19 @ 00:28)      08-29 Chol 75 LDL 17 HDL 29<L> Trig 143 DIABETES FOLLOW UP NOTE:   INTERVAL HX: 67 y/o M w/h/o uncontrolled  T2DM>on insulin basal/bolus PTA. DM c/b CAD> s/p CABG and  CKD3. H/o spinal stenosis s/p T10-L4 Laminectomy in 2016  now admitted fro revision of laminectomy and spinal fusion on 8/27 and requiring anterior lumbar interbody fusion today. Saw pt in PACU. Pt alert but disoriented and slightly confused at time of visit. BG tested at 192mg/dL. Unable to determine if pt will be able to take any POs tonight.       Review of Systems:  General: Positive surgical pain.    Cardiovascular: No chest pain, palpitations  Respiratory: No SOB, no cough  GI: No nausea, vomiting, abdominal pain  Endocrine: no polyuria, polydipsia or S&Sx of hypoglycemia    Allergies    No Known Allergies    Intolerances      MEDICATIONS:  atorvastatin 10 milliGRAM(s) Oral at bedtime  cholecalciferol 2000 Unit(s) Oral daily  finasteride 5 milliGRAM(s) Oral daily  insulin glargine Injectable (LANTUS) 27 Unit(s) SubCutaneous at bedtime  insulin lispro (HumaLOG) corrective regimen sliding scale   SubCutaneous three times a day before meals  insulin lispro (HumaLOG) corrective regimen sliding scale   SubCutaneous at bedtime    PHYSICAL EXAM:  VITALS: T(C): 36.6 (09-03-19 @ 16:25)  T(F): 97.9 (09-03-19 @ 16:25), Max: 99.1 (09-03-19 @ 04:07)  HR: 91 (09-03-19 @ 17:15) (88 - 100)  BP: 115/79 (09-03-19 @ 17:15) (109/73 - 144/87)  RR:  (16 - 18)  SpO2:  (95% - 100%)  Wt(kg): --  GENERAL: Male laying in bed in NAD. Daughter at bedside  Abdomen: Soft, nontender, non distended. Obese, Abdominal dressing D/I  Extremities: Warm, no edema in all 4 exts  NEURO: Alert but slightly confused and disorineted likley due to anesthesia     LABS:  POCT Blood Glucose.: 192 mg/dL (09-03-19 @ 17:51)  POCT Blood Glucose.: 159 mg/dL (09-02-19 @ 21:35)  POCT Blood Glucose.: 175 mg/dL (09-02-19 @ 16:31)  POCT Blood Glucose.: 148 mg/dL (09-02-19 @ 12:55)  POCT Blood Glucose.: 135 mg/dL (09-02-19 @ 08:01)  POCT Blood Glucose.: 145 mg/dL (09-01-19 @ 22:15)  POCT Blood Glucose.: 120 mg/dL (09-01-19 @ 18:38)                          8.0    11.2  )-----------( 260      ( 03 Sep 2019 05:09 )             23.2       09-03    131<L>  |  98  |  54<H>  ----------------------------<  117<H>  4.2   |  20<L>  |  2.23<H>    EGFR if non : 29<L>    Ca    8.5      09-03      Hemoglobin A1C, Whole Blood: 9.4 % <H> [4.0 - 5.6] (08-14-19 @ 00:28)      08-29 Chol 75 LDL 17 HDL 29<L> Trig 143

## 2019-09-03 NOTE — PRE-ANESTHESIA EVALUATION ADULT - NSANTHOSAYNRD_GEN_A_CORE
No. RAJENDRA screening performed.  STOP BANG Legend: 0-2 = LOW Risk; 3-4 = INTERMEDIATE Risk; 5-8 = HIGH Risk
No. RAJENDRA screening performed.  STOP BANG Legend: 0-2 = LOW Risk; 3-4 = INTERMEDIATE Risk; 5-8 = HIGH Risk

## 2019-09-03 NOTE — PROGRESS NOTE ADULT - ATTENDING COMMENTS
67 yo male s/p extension of laminectomy and spinal fusion from T11-S1 with revision laminectomy with improved overall symptoms, including numbness, tingling and weakness, he is here for stage 2 ALIF/PSF, with BMP , given his history of pseudarthrosis, and long construct he would benefit from ALIF L4-S1 with BMP.  I explained this to the patient an family.     I reviewed all major risks, benefits, and complications associated with surgical intervention including but not limited to bleeding, infection, neurological injury, CSF leak, need for further surgical intervention, implant failure, implant migration, pseudarthrosis,  adjacent segment degeneration, pedicle screw breech, hematoma, chronic pain, worsening of pain, risk associated with BMP, off label use of it, retrograde ejaculation (if male patient), anesthetic risk, chronic pain and disability, medical complications (GI, , DVT, PE, cardiac, pulmonary, etc) and risk of mortality.

## 2019-09-03 NOTE — PROGRESS NOTE ADULT - SUBJECTIVE AND OBJECTIVE BOX
Orthopaedic Surgery Progress Note    Subjective:   Patient seen and examined  No acute events overnight    Objective:  T(C): 37.3 (09-03-19 @ 04:20), Max: 37.4 (09-02-19 @ 16:15)  HR: 88 (09-03-19 @ 04:20) (88 - 100)  BP: 133/81 (09-03-19 @ 04:20) (108/68 - 154/94)  RR: 18 (09-03-19 @ 04:20) (18 - 18)  SpO2: 97% (09-03-19 @ 04:20) (95% - 99%)  Wt(kg): --    09-01 @ 07:01  -  09-02 @ 07:00  --------------------------------------------------------  IN: 1430 mL / OUT: 3665 mL / NET: -2235 mL    09-02 @ 07:01  -  09-03 @ 06:17  --------------------------------------------------------  IN: 1130 mL / OUT: 2555 mL / NET: -1425 mL        PE    NAD  Back:   dressing C/D/I, mild appropriate reynaldo-incisional ttp  HMV in place w/ serosanguinous output: 15/55  Neuro:  RLE IP 5/5 HS 5/5 Q 5/5 GS 5/5 TA 5/5 EHL 5/5   SILT L2-S1  LLE IP 5/5 HS 5/5 Q 5/5 GS 5/5 TA 5/5 EHL 5/5  SILT L2-S1  WWP BLE                          8.0    11.2  )-----------( 260      ( 03 Sep 2019 05:09 )             23.2     09-03    131<L>  |  98  |  54<H>  ----------------------------<  117<H>  4.2   |  20<L>  |  2.23<H>    Ca    8.5      03 Sep 2019 05:09      PT/INR - ( 03 Sep 2019 05:09 )   PT: 14.0 sec;   INR: 1.21 ratio         PTT - ( 03 Sep 2019 05:09 )  PTT:31.4 sec    A/P: 67 y/o M s/p T11-S1 Lami/Fusion POD 7 (8/27/19), ALIF today with Dr. Koehler   NPO/IVF  WBAT  ELBERT as per Plastics  Continue ancef until ELBERT removed  Pain management prn  GI ppx  Peters to gravity

## 2019-09-04 LAB
ANION GAP SERPL CALC-SCNC: 12 MMOL/L — SIGNIFICANT CHANGE UP (ref 5–17)
BASOPHILS # BLD AUTO: 0.05 K/UL — SIGNIFICANT CHANGE UP (ref 0–0.2)
BASOPHILS NFR BLD AUTO: 0.3 % — SIGNIFICANT CHANGE UP (ref 0–2)
BUN SERPL-MCNC: 38 MG/DL — HIGH (ref 7–23)
CALCIUM SERPL-MCNC: 8.2 MG/DL — LOW (ref 8.4–10.5)
CHLORIDE SERPL-SCNC: 100 MMOL/L — SIGNIFICANT CHANGE UP (ref 96–108)
CO2 SERPL-SCNC: 22 MMOL/L — SIGNIFICANT CHANGE UP (ref 22–31)
CREAT SERPL-MCNC: 1.99 MG/DL — HIGH (ref 0.5–1.3)
EOSINOPHIL # BLD AUTO: 0.02 K/UL — SIGNIFICANT CHANGE UP (ref 0–0.5)
EOSINOPHIL NFR BLD AUTO: 0.1 % — SIGNIFICANT CHANGE UP (ref 0–6)
GLUCOSE BLDC GLUCOMTR-MCNC: 146 MG/DL — HIGH (ref 70–99)
GLUCOSE BLDC GLUCOMTR-MCNC: 162 MG/DL — HIGH (ref 70–99)
GLUCOSE BLDC GLUCOMTR-MCNC: 182 MG/DL — HIGH (ref 70–99)
GLUCOSE BLDC GLUCOMTR-MCNC: 185 MG/DL — HIGH (ref 70–99)
GLUCOSE SERPL-MCNC: 149 MG/DL — HIGH (ref 70–99)
HCT VFR BLD CALC: 28.5 % — LOW (ref 39–50)
HCT VFR BLD CALC: 30.2 % — LOW (ref 39–50)
HGB BLD-MCNC: 10.2 G/DL — LOW (ref 13–17)
HGB BLD-MCNC: 10.3 G/DL — LOW (ref 13–17)
IMM GRANULOCYTES NFR BLD AUTO: 1.2 % — SIGNIFICANT CHANGE UP (ref 0–1.5)
LYMPHOCYTES # BLD AUTO: 0.62 K/UL — LOW (ref 1–3.3)
LYMPHOCYTES # BLD AUTO: 3.6 % — LOW (ref 13–44)
MCHC RBC-ENTMCNC: 29.1 PG — SIGNIFICANT CHANGE UP (ref 27–34)
MCHC RBC-ENTMCNC: 31.5 PG — SIGNIFICANT CHANGE UP (ref 27–34)
MCHC RBC-ENTMCNC: 33.8 GM/DL — SIGNIFICANT CHANGE UP (ref 32–36)
MCHC RBC-ENTMCNC: 36 GM/DL — SIGNIFICANT CHANGE UP (ref 32–36)
MCV RBC AUTO: 86.3 FL — SIGNIFICANT CHANGE UP (ref 80–100)
MCV RBC AUTO: 87.4 FL — SIGNIFICANT CHANGE UP (ref 80–100)
MONOCYTES # BLD AUTO: 1.48 K/UL — HIGH (ref 0–0.9)
MONOCYTES NFR BLD AUTO: 8.6 % — SIGNIFICANT CHANGE UP (ref 2–14)
NEUTROPHILS # BLD AUTO: 14.82 K/UL — HIGH (ref 1.8–7.4)
NEUTROPHILS NFR BLD AUTO: 86.2 % — HIGH (ref 43–77)
PLATELET # BLD AUTO: 226 K/UL — SIGNIFICANT CHANGE UP (ref 150–400)
PLATELET # BLD AUTO: 249 K/UL — SIGNIFICANT CHANGE UP (ref 150–400)
POTASSIUM SERPL-MCNC: 4.3 MMOL/L — SIGNIFICANT CHANGE UP (ref 3.5–5.3)
POTASSIUM SERPL-SCNC: 4.3 MMOL/L — SIGNIFICANT CHANGE UP (ref 3.5–5.3)
RBC # BLD: 3.26 M/UL — LOW (ref 4.2–5.8)
RBC # BLD: 3.5 M/UL — LOW (ref 4.2–5.8)
RBC # FLD: 13.2 % — SIGNIFICANT CHANGE UP (ref 10.3–14.5)
RBC # FLD: 13.9 % — SIGNIFICANT CHANGE UP (ref 10.3–14.5)
SODIUM SERPL-SCNC: 134 MMOL/L — LOW (ref 135–145)
WBC # BLD: 17.19 K/UL — HIGH (ref 3.8–10.5)
WBC # BLD: 17.2 K/UL — HIGH (ref 3.8–10.5)
WBC # FLD AUTO: 17.19 K/UL — HIGH (ref 3.8–10.5)
WBC # FLD AUTO: 17.2 K/UL — HIGH (ref 3.8–10.5)

## 2019-09-04 PROCEDURE — 99232 SBSQ HOSP IP/OBS MODERATE 35: CPT

## 2019-09-04 PROCEDURE — 20610 DRAIN/INJ JOINT/BURSA W/O US: CPT | Mod: 79,LT

## 2019-09-04 RX ORDER — TRIAMCINOLONE 4 MG
40 TABLET ORAL ONCE
Refills: 0 | Status: COMPLETED | OUTPATIENT
Start: 2019-09-04 | End: 2019-09-04

## 2019-09-04 RX ORDER — INSULIN LISPRO 100/ML
8 VIAL (ML) SUBCUTANEOUS
Refills: 0 | Status: DISCONTINUED | OUTPATIENT
Start: 2019-09-05 | End: 2019-09-06

## 2019-09-04 RX ORDER — LIDOCAINE HCL 20 MG/ML
10 VIAL (ML) INJECTION ONCE
Refills: 0 | Status: COMPLETED | OUTPATIENT
Start: 2019-09-04 | End: 2019-09-04

## 2019-09-04 RX ADMIN — TRAMADOL HYDROCHLORIDE 50 MILLIGRAM(S): 50 TABLET ORAL at 19:59

## 2019-09-04 RX ADMIN — OXYCODONE HYDROCHLORIDE 10 MILLIGRAM(S): 5 TABLET ORAL at 09:04

## 2019-09-04 RX ADMIN — OXYCODONE HYDROCHLORIDE 10 MILLIGRAM(S): 5 TABLET ORAL at 17:33

## 2019-09-04 RX ADMIN — PANTOPRAZOLE SODIUM 40 MILLIGRAM(S): 20 TABLET, DELAYED RELEASE ORAL at 05:58

## 2019-09-04 RX ADMIN — Medication 100 MILLIGRAM(S): at 22:08

## 2019-09-04 RX ADMIN — TAMSULOSIN HYDROCHLORIDE 0.4 MILLIGRAM(S): 0.4 CAPSULE ORAL at 17:03

## 2019-09-04 RX ADMIN — OXYCODONE HYDROCHLORIDE 10 MILLIGRAM(S): 5 TABLET ORAL at 13:16

## 2019-09-04 RX ADMIN — Medication 10 MILLIGRAM(S): at 10:37

## 2019-09-04 RX ADMIN — Medication 100 MILLIGRAM(S): at 13:30

## 2019-09-04 RX ADMIN — Medication 7 UNIT(S): at 17:11

## 2019-09-04 RX ADMIN — Medication 325 MILLIGRAM(S): at 11:29

## 2019-09-04 RX ADMIN — Medication 40 MILLIGRAM(S): at 13:42

## 2019-09-04 RX ADMIN — Medication 7 UNIT(S): at 08:43

## 2019-09-04 RX ADMIN — ATORVASTATIN CALCIUM 10 MILLIGRAM(S): 80 TABLET, FILM COATED ORAL at 22:04

## 2019-09-04 RX ADMIN — OXYCODONE HYDROCHLORIDE 10 MILLIGRAM(S): 5 TABLET ORAL at 17:03

## 2019-09-04 RX ADMIN — TRAMADOL HYDROCHLORIDE 50 MILLIGRAM(S): 50 TABLET ORAL at 05:59

## 2019-09-04 RX ADMIN — Medication 100 MILLIGRAM(S): at 06:57

## 2019-09-04 RX ADMIN — TRAMADOL HYDROCHLORIDE 50 MILLIGRAM(S): 50 TABLET ORAL at 06:45

## 2019-09-04 RX ADMIN — Medication 100 MILLIGRAM(S): at 00:11

## 2019-09-04 RX ADMIN — Medication 20 MILLIGRAM(S): at 05:58

## 2019-09-04 RX ADMIN — OXYCODONE HYDROCHLORIDE 10 MILLIGRAM(S): 5 TABLET ORAL at 08:34

## 2019-09-04 RX ADMIN — OXYCODONE HYDROCHLORIDE 10 MILLIGRAM(S): 5 TABLET ORAL at 02:25

## 2019-09-04 RX ADMIN — OXYCODONE HYDROCHLORIDE 10 MILLIGRAM(S): 5 TABLET ORAL at 03:15

## 2019-09-04 RX ADMIN — OXYCODONE HYDROCHLORIDE 10 MILLIGRAM(S): 5 TABLET ORAL at 12:46

## 2019-09-04 RX ADMIN — SENNA PLUS 2 TABLET(S): 8.6 TABLET ORAL at 22:03

## 2019-09-04 RX ADMIN — HYDROMORPHONE HYDROCHLORIDE 0.5 MILLIGRAM(S): 2 INJECTION INTRAMUSCULAR; INTRAVENOUS; SUBCUTANEOUS at 10:49

## 2019-09-04 RX ADMIN — Medication 7 UNIT(S): at 13:30

## 2019-09-04 RX ADMIN — HYDROMORPHONE HYDROCHLORIDE 0.5 MILLIGRAM(S): 2 INJECTION INTRAMUSCULAR; INTRAVENOUS; SUBCUTANEOUS at 10:34

## 2019-09-04 RX ADMIN — Medication 1: at 13:30

## 2019-09-04 RX ADMIN — Medication 650 MILLIGRAM(S): at 00:20

## 2019-09-04 RX ADMIN — FINASTERIDE 5 MILLIGRAM(S): 5 TABLET, FILM COATED ORAL at 11:29

## 2019-09-04 RX ADMIN — Medication 100 MILLIGRAM(S): at 22:04

## 2019-09-04 RX ADMIN — Medication 100 MILLIGRAM(S): at 08:34

## 2019-09-04 RX ADMIN — Medication 100 MILLIGRAM(S): at 17:03

## 2019-09-04 RX ADMIN — LOSARTAN POTASSIUM 25 MILLIGRAM(S): 100 TABLET, FILM COATED ORAL at 05:59

## 2019-09-04 RX ADMIN — INSULIN GLARGINE 25 UNIT(S): 100 INJECTION, SOLUTION SUBCUTANEOUS at 22:04

## 2019-09-04 RX ADMIN — TRAMADOL HYDROCHLORIDE 50 MILLIGRAM(S): 50 TABLET ORAL at 20:45

## 2019-09-04 RX ADMIN — Medication 2000 UNIT(S): at 11:29

## 2019-09-04 RX ADMIN — Medication 100 MILLIGRAM(S): at 05:58

## 2019-09-04 RX ADMIN — TAMSULOSIN HYDROCHLORIDE 0.4 MILLIGRAM(S): 0.4 CAPSULE ORAL at 05:58

## 2019-09-04 RX ADMIN — Medication 10 MILLILITER(S): at 13:42

## 2019-09-04 RX ADMIN — Medication 1: at 17:11

## 2019-09-04 RX ADMIN — Medication 650 MILLIGRAM(S): at 01:00

## 2019-09-04 NOTE — PROGRESS NOTE ADULT - SUBJECTIVE AND OBJECTIVE BOX
Orthopaedic Surgery Progress Note    Subjective:   Patient seen and examined  No acute events overnight    Objective:  T(C): 37.3 (09-03-19 @ 04:20), Max: 37.4 (09-02-19 @ 16:15)  HR: 88 (09-03-19 @ 04:20) (88 - 100)  BP: 133/81 (09-03-19 @ 04:20) (108/68 - 154/94)  RR: 18 (09-03-19 @ 04:20) (18 - 18)  SpO2: 97% (09-03-19 @ 04:20) (95% - 99%)  Wt(kg): --    09-01 @ 07:01  -  09-02 @ 07:00  --------------------------------------------------------  IN: 1430 mL / OUT: 3665 mL / NET: -2235 mL    09-02 @ 07:01  -  09-03 @ 06:17  --------------------------------------------------------  IN: 1130 mL / OUT: 2555 mL / NET: -1425 mL        PE    NAD  Back:   dressing C/D/I, mild appropriate reynaldo-incisional ttp  HMV in place  Neuro:  RLE IP 5/5 HS 5/5 Q 5/5 GS 5/5 TA 5/5 EHL 5/5   SILT L2-S1  LLE IP 5/5 HS 5/5 Q 5/5 GS 5/5 TA 5/5 EHL 5/5  SILT L2-S1  WWP BLE                          8.0    11.2  )-----------( 260      ( 03 Sep 2019 05:09 )             23.2     09-03    131<L>  |  98  |  54<H>  ----------------------------<  117<H>  4.2   |  20<L>  |  2.23<H>    Ca    8.5      03 Sep 2019 05:09      PT/INR - ( 03 Sep 2019 05:09 )   PT: 14.0 sec;   INR: 1.21 ratio         PTT - ( 03 Sep 2019 05:09 )  PTT:31.4 sec    A/P: 67 y/o M s/p T11-S1 Lami/Fusion POD 8 (8/27/19), ALIF 9/3 POD #1    reg diet  WBAT  ELBERT as per Plastics  Continue ancef until ELBERT removed  Pain management prn  GI ppx  Peters to gravity

## 2019-09-04 NOTE — PROCEDURE NOTE - NSINFORMCONSENT_GEN_A_CORE
This was an emergent procedure./OR
Benefits, risks, and possible complications of procedure explained to patient/caregiver who verbalized understanding and gave verbal consent.
(0) independent

## 2019-09-04 NOTE — PROGRESS NOTE ADULT - SUBJECTIVE AND OBJECTIVE BOX
STATUS POST:  spine exposure for L4-5 and L5-S1 Anterior lumbar interbody fusion  POST OPERATIVE DAY #: 0    Patient seen and examined at bedside. Pain controlled. denies dizziness, SOB, CP or palpitations. Pt. was tachy o/n to the 110s.      Objective:  Vital Signs Last 24 Hrs  T(C): 36.8 (04 Sep 2019 04:55), Max: 37.8 (03 Sep 2019 22:45)  T(F): 98.2 (04 Sep 2019 04:55), Max: 100 (03 Sep 2019 22:45)  HR: 98 (04 Sep 2019 04:55) (88 - 115)  BP: 137/77 (04 Sep 2019 04:55) (109/73 - 169/95)  BP(mean): 106 (03 Sep 2019 19:15) (87 - 117)  RR: 18 (04 Sep 2019 04:55) (16 - 18)  SpO2: 95% (04 Sep 2019 04:55) (95% - 100%)    I&O's Detail    03 Sep 2019 07:01  -  04 Sep 2019 07:00  --------------------------------------------------------  IN:    IV PiggyBack: 200 mL    lactated ringers.: 1750 mL    Oral Fluid: 930 mL  Total IN: 2880 mL    OUT:    Bulb: 30 mL    Indwelling Catheter - Urethral: 1300 mL    Voided: 300 mL  Total OUT: 1630 mL    Total NET: 1250 mL          MEDICATIONS  (STANDING):  atorvastatin 10 milliGRAM(s) Oral at bedtime  bisacodyl Suppository 10 milliGRAM(s) Rectal once  ceFAZolin   IVPB 2000 milliGRAM(s) IV Intermittent every 8 hours  cholecalciferol 2000 Unit(s) Oral daily  docusate sodium 100 milliGRAM(s) Oral three times a day  ferrous    sulfate 325 milliGRAM(s) Oral daily  finasteride 5 milliGRAM(s) Oral daily  furosemide    Tablet 20 milliGRAM(s) Oral every 24 hours  insulin glargine Injectable (LANTUS) 25 Unit(s) SubCutaneous at bedtime  insulin lispro (HumaLOG) corrective regimen sliding scale   SubCutaneous three times a day before meals  insulin lispro (HumaLOG) corrective regimen sliding scale   SubCutaneous at bedtime  insulin lispro Injectable (HumaLOG) 7 Unit(s) SubCutaneous three times a day before meals  lactated ringers. 1000 milliLiter(s) (125 mL/Hr) IV Continuous <Continuous>  losartan 25 milliGRAM(s) Oral daily  pantoprazole    Tablet 40 milliGRAM(s) Oral before breakfast  pregabalin 100 milliGRAM(s) Oral two times a day  senna 2 Tablet(s) Oral at bedtime  tamsulosin 0.4 milliGRAM(s) Oral two times a day    MEDICATIONS  (PRN):  acetaminophen   Tablet .. 650 milliGRAM(s) Oral every 6 hours PRN Temp greater or equal to 38C (100.4F), Mild Pain (1 - 3)  cyclobenzaprine 5 milliGRAM(s) Oral three times a day PRN Muscle Spasm  diazepam    Tablet 5 milliGRAM(s) Oral every 12 hours PRN Anxiety  guaiFENesin    Syrup 100 milliGRAM(s) Oral every 6 hours PRN congestion  HYDROmorphone  Injectable 0.5 milliGRAM(s) SubCutaneous every 6 hours PRN breakthrough pain  magnesium hydroxide Suspension 30 milliLiter(s) Oral daily PRN Constipation  ondansetron Injectable 4 milliGRAM(s) IV Push every 6 hours PRN Nausea  oxyCODONE    IR 5 milliGRAM(s) Oral every 4 hours PRN Moderate Pain (4 - 6)  oxyCODONE    IR 10 milliGRAM(s) Oral every 4 hours PRN Severe Pain (7 - 10)  polyethylene glycol 3350 17 Gram(s) Oral daily PRN Constipation  traMADol 50 milliGRAM(s) Oral every 4 hours PRN Mild Pain (1 - 3)                            10.3   17.2  )-----------( 226      ( 04 Sep 2019 03:52 )             28.5       09-03    136  |  105  |  45<H>  ----------------------------<  192<H>  4.8   |  16<L>  |  1.98<H>    Ca    7.0<L>      03 Sep 2019 17:25          PHYSICAL EXAM:  Constitutional: NAD, A&O x 3  Respiratory: non labored breathing   Gastrointestinal: abd soft, ND, mild tenderness around incision at left lower quadrant abd. incision with dressing in place, dressing clean, intact, no strikethrough bleeding   Vascular: + palpable femoral pulses b/l, palp DP/PT b/l

## 2019-09-04 NOTE — PROGRESS NOTE ADULT - ATTENDING COMMENTS
69 yo male s/p extension of laminectomy and spinal fusion from T11-S1 with revision laminectomy with improved overall symptoms, including numbness, tingling and weakness, he is here for stage 2 ALIF/PSF, with BMP , given his history of pseudarthrosis, and long construct he would benefit from ALIF L4-S1 with BMP.  I explained this to the patient an family.     I reviewed all major risks, benefits, and complications associated with surgical intervention including but not limited to bleeding, infection, neurological injury, CSF leak, need for further surgical intervention, implant failure, implant migration, pseudarthrosis,  adjacent segment degeneration, pedicle screw breech, hematoma, chronic pain, worsening of pain, risk associated with BMP, off label use of it, retrograde ejaculation (if male patient), anesthetic risk, chronic pain and disability, medical complications (GI, , DVT, PE, cardiac, pulmonary, etc) and risk of mortality.

## 2019-09-04 NOTE — PROGRESS NOTE ADULT - SUBJECTIVE AND OBJECTIVE BOX
Plastic Surgery     SUBJECTIVE:  The patient was seen and examined. No acute events overnight. Pain controlled.    OBJECTIVE:   Vital Signs Last 24 Hrs  T(C): 36.8 (04 Sep 2019 04:55), Max: 37.8 (03 Sep 2019 22:45)  T(F): 98.2 (04 Sep 2019 04:55), Max: 100 (03 Sep 2019 22:45)  HR: 98 (04 Sep 2019 04:55) (88 - 115)  BP: 137/77 (04 Sep 2019 04:55) (109/73 - 169/95)  BP(mean): 106 (03 Sep 2019 19:15) (87 - 117)  RR: 18 (04 Sep 2019 04:55) (16 - 18)  SpO2: 95% (04 Sep 2019 04:55) (95% - 100%)    General: NAD, Lying in bed comfortably  Back: No collections, drain sites CDI, dressing CDI without strikethrough. Remaining [facility]    09-03    136  |  105  |  45<H>  ----------------------------<  192<H>  4.8   |  16<L>  |  1.98<H>    Ca    7.0<L>      03 Sep 2019 17:25                          10.3   17.2  )-----------( 226      ( 04 Sep 2019 03:52 )             28.5

## 2019-09-04 NOTE — PROGRESS NOTE ADULT - ASSESSMENT
67 y/o M w/h/o uncontrolled  T2DM>on insulin basal/bolus PTA. DM c/b CAD> s/p CABG and  CKD3. H/o spinal stenosis s/p T10-L4 Laminectomy in 2016  now admitted fro revision of laminectomy  and spinal fusion on 8/27 and now requiring anterior lumbar interbody fusion 9/3/19. Pt tolerating POs with prandial BG going up as PO improves. Will increase premeal Humalog to keep BG at goal of 100s to 180s. No hypoglycemia. Spoke to pt and daughter about the need to have portion control regarding carbs if eating food from home and hospital. They verbalized understanding. Creat improving but WBC up> on antibiotic

## 2019-09-04 NOTE — PROGRESS NOTE ADULT - SUBJECTIVE AND OBJECTIVE BOX
Patient is a 68y old  Male who presents with a chief complaint of back pain (28 Aug 2019 14:15)      SUBJECTIVE / OVERNIGHT EVENTS:    Events noted.  CONSTITUTIONAL: Rt leg and lt shoulder pain  RESPIRATORY: No cough, wheezing,  No shortness of breath  CARDIOVASCULAR: No chest pain, palpitations, dizziness, or leg swelling  GASTROINTESTINAL: No abdominal or epigastric pain.   NEUROLOGICAL: No headaches,     MEDICATIONS  (STANDING):  atorvastatin 10 milliGRAM(s) Oral at bedtime  ceFAZolin   IVPB 2000 milliGRAM(s) IV Intermittent every 8 hours  cholecalciferol 2000 Unit(s) Oral daily  docusate sodium 100 milliGRAM(s) Oral three times a day  ferrous    sulfate 325 milliGRAM(s) Oral daily  finasteride 5 milliGRAM(s) Oral daily  furosemide    Tablet 20 milliGRAM(s) Oral every 24 hours  insulin glargine Injectable (LANTUS) 27 Unit(s) SubCutaneous at bedtime  insulin lispro (HumaLOG) corrective regimen sliding scale   SubCutaneous three times a day before meals  insulin lispro (HumaLOG) corrective regimen sliding scale   SubCutaneous at bedtime  insulin lispro Injectable (HumaLOG) 9 Unit(s) SubCutaneous three times a day with meals  losartan 25 milliGRAM(s) Oral daily  pantoprazole    Tablet 40 milliGRAM(s) Oral before breakfast  pregabalin 100 milliGRAM(s) Oral two times a day  senna 2 Tablet(s) Oral at bedtime  sodium chloride 0.9%. 1000 milliLiter(s) (20 mL/Hr) IV Continuous <Continuous>  tamsulosin 0.4 milliGRAM(s) Oral two times a day    MEDICATIONS  (PRN):  cyclobenzaprine 5 milliGRAM(s) Oral three times a day PRN Muscle Spasm  guaiFENesin    Syrup 100 milliGRAM(s) Oral every 6 hours PRN congestion  magnesium hydroxide Suspension 30 milliLiter(s) Oral daily PRN Constipation  ondansetron Injectable 4 milliGRAM(s) IV Push every 6 hours PRN Nausea  oxyCODONE    IR 10 milliGRAM(s) Oral every 4 hours PRN Severe Pain (7 - 10)  oxyCODONE    IR 5 milliGRAM(s) Oral every 4 hours PRN Moderate Pain (4 - 6)  polyethylene glycol 3350 17 Gram(s) Oral daily PRN Constipation  traMADol 50 milliGRAM(s) Oral every 4 hours PRN Mild Pain (1 - 3)        CAPILLARY BLOOD GLUCOSE      POCT Blood Glucose.: 200 mg/dL (30 Aug 2019 17:25)  POCT Blood Glucose.: 205 mg/dL (30 Aug 2019 12:04)  POCT Blood Glucose.: 138 mg/dL (30 Aug 2019 08:22)  POCT Blood Glucose.: 201 mg/dL (29 Aug 2019 21:41)  POCT Blood Glucose.: 209 mg/dL (29 Aug 2019 18:41)    I&O's Summary    29 Aug 2019 07:01  -  30 Aug 2019 07:00  --------------------------------------------------------  IN: 990 mL / OUT: 3471 mL / NET: -2481 mL    30 Aug 2019 07:01  -  30 Aug 2019 18:16  --------------------------------------------------------  IN: 610 mL / OUT: 940 mL / NET: -330 mL        PHYSICAL EXAM:    NECK: Supple, No JVD  CHEST/LUNG: Clear to auscultation bilaterally; No wheezing.  HEART: Regular rate and rhythm; No murmurs, rubs, or gallops  ABDOMEN: Soft, Nontender, Nondistended; Bowel sounds present  EXTREMITIES:   No edema  NEUROLOGY: AAO       LABS:                        9.9    15.8  )-----------( 132      ( 29 Aug 2019 03:09 )             28.9     08-29    134<L>  |  102  |  27<H>  ----------------------------<  206<H>  4.6   |  21<L>  |  2.09<H>    Ca    8.8      29 Aug 2019 03:09  Phos  3.6     08-29  Mg     1.9     08-29      PT/INR - ( 28 Aug 2019 21:39 )   PT: 14.7 sec;   INR: 1.27 ratio         PTT - ( 28 Aug 2019 21:39 )  PTT:31.5 sec  CARDIAC MARKERS ( 29 Aug 2019 03:09 )  x     / x     / 2284 U/L / x     / 8.3 ng/mL  CARDIAC MARKERS ( 28 Aug 2019 21:39 )  x     / x     / 2565 U/L / x     / 9.8 ng/mL        CAPILLARY BLOOD GLUCOSE      POCT Blood Glucose.: 200 mg/dL (30 Aug 2019 17:25)  POCT Blood Glucose.: 205 mg/dL (30 Aug 2019 12:04)  POCT Blood Glucose.: 138 mg/dL (30 Aug 2019 08:22)  POCT Blood Glucose.: 201 mg/dL (29 Aug 2019 21:41)  POCT Blood Glucose.: 209 mg/dL (29 Aug 2019 18:41)                RADIOLOGY & ADDITIONAL TESTS:    Imaging Personally Reviewed:    Consultant(s) Notes Reviewed:      Care Discussed with Consultants/Other Providers:

## 2019-09-04 NOTE — PROGRESS NOTE ADULT - ASSESSMENT
A/P: 68M s/p T11-S1 Lami/Fusion.    S/p T11-S1 Lami/Fusion:    Ortho spine f/up noted.  Pain control through Dilaudid PCA  ALIF on tuesday    DM II:  Lantus/Humalog/FSSS  Endo f/up noted.    Lt shoulder pain:  S/p steroid injection      HTN:  Cw lasix/Losartan    CKD IV:  BMP/Cr stable

## 2019-09-04 NOTE — PROGRESS NOTE ADULT - ASSESSMENT
A/P: 68y Male Spinal stenosis of lumbosacral region. POD#0 s/p      - Tachycardia: 110's o/n. pain control. trend CBC r/o bleeding, hgb has been stable  - Diet: regular   - Activity: OOB as tolerated   - Pain medication: tylenol, oxycodone, prn   - DVT ppx: per primary team    Vascular Surgery   [id number]

## 2019-09-04 NOTE — PROVIDER CONTACT NOTE (OTHER) - ACTION/TREATMENT ORDERED:
as per Johann GAMBINO no other interventions at this time, IV fluids infusing on pt currently, pt will be seen by PT later today

## 2019-09-04 NOTE — PROGRESS NOTE ADULT - SUBJECTIVE AND OBJECTIVE BOX
Vascular Surgery Post-op Note   STATUS POST:  spine exposure for L4-5 and L5-S1 Anterior lumbar interbody fusion  POST OPERATIVE DAY #: 0     ID [id number]    patient seen and examined at bedside. c/o incisional pain at lower abd. denies dizziness, SOB, CP or palpitations.     Vital Signs Last 24 Hrs  T(C): 36.8 (04 Sep 2019 01:08), Max: 37.8 (03 Sep 2019 22:45)  T(F): 98.3 (04 Sep 2019 01:08), Max: 100 (03 Sep 2019 22:45)  HR: 115 (04 Sep 2019 01:08) (88 - 115)  BP: 125/75 (04 Sep 2019 01:08) (109/73 - 169/95)  BP(mean): 106 (03 Sep 2019 19:15) (87 - 117)  RR: 18 (04 Sep 2019 01:08) (16 - 18)  SpO2: 97% (04 Sep 2019 01:08) (95% - 100%)  I&O's Summary    02 Sep 2019 07:01  -  03 Sep 2019 07:00  --------------------------------------------------------  IN: 1130 mL / OUT: 3715 mL / NET: -2585 mL    03 Sep 2019 07:01  -  04 Sep 2019 03:18  --------------------------------------------------------  IN: 800 mL / OUT: 595 mL / NET: 205 mL      I&O's Detail    02 Sep 2019 07:01  -  03 Sep 2019 07:00  --------------------------------------------------------  IN:    IV PiggyBack: 50 mL    Oral Fluid: 1080 mL  Total IN: 1130 mL    OUT:    Bulb: 65 mL    Indwelling Catheter - Urethral: 3650 mL  Total OUT: 3715 mL    Total NET: -2585 mL      03 Sep 2019 07:01  -  04 Sep 2019 03:18  --------------------------------------------------------  IN:    IV PiggyBack: 100 mL    lactated ringers.: 250 mL    Oral Fluid: 450 mL  Total IN: 800 mL    OUT:    Bulb: 20 mL    Indwelling Catheter - Urethral: 275 mL    Voided: 300 mL  Total OUT: 595 mL    Total NET: 205 mL      MEDICATIONS  (STANDING):  atorvastatin 10 milliGRAM(s) Oral at bedtime  bisacodyl Suppository 10 milliGRAM(s) Rectal once  ceFAZolin   IVPB 2000 milliGRAM(s) IV Intermittent every 8 hours  cholecalciferol 2000 Unit(s) Oral daily  docusate sodium 100 milliGRAM(s) Oral three times a day  ferrous    sulfate 325 milliGRAM(s) Oral daily  finasteride 5 milliGRAM(s) Oral daily  furosemide    Tablet 20 milliGRAM(s) Oral every 24 hours  insulin glargine Injectable (LANTUS) 25 Unit(s) SubCutaneous at bedtime  insulin lispro (HumaLOG) corrective regimen sliding scale   SubCutaneous three times a day before meals  insulin lispro (HumaLOG) corrective regimen sliding scale   SubCutaneous at bedtime  insulin lispro Injectable (HumaLOG) 7 Unit(s) SubCutaneous three times a day before meals  lactated ringers. 1000 milliLiter(s) (125 mL/Hr) IV Continuous <Continuous>  losartan 25 milliGRAM(s) Oral daily  pantoprazole    Tablet 40 milliGRAM(s) Oral before breakfast  pregabalin 100 milliGRAM(s) Oral two times a day  senna 2 Tablet(s) Oral at bedtime  tamsulosin 0.4 milliGRAM(s) Oral two times a day    MEDICATIONS  (PRN):  acetaminophen   Tablet .. 650 milliGRAM(s) Oral every 6 hours PRN Temp greater or equal to 38C (100.4F), Mild Pain (1 - 3)  cyclobenzaprine 5 milliGRAM(s) Oral three times a day PRN Muscle Spasm  diazepam    Tablet 5 milliGRAM(s) Oral every 12 hours PRN Anxiety  guaiFENesin    Syrup 100 milliGRAM(s) Oral every 6 hours PRN congestion  HYDROmorphone  Injectable 0.5 milliGRAM(s) SubCutaneous every 6 hours PRN breakthrough pain  magnesium hydroxide Suspension 30 milliLiter(s) Oral daily PRN Constipation  ondansetron Injectable 4 milliGRAM(s) IV Push every 6 hours PRN Nausea  oxyCODONE    IR 5 milliGRAM(s) Oral every 4 hours PRN Moderate Pain (4 - 6)  oxyCODONE    IR 10 milliGRAM(s) Oral every 4 hours PRN Severe Pain (7 - 10)  polyethylene glycol 3350 17 Gram(s) Oral daily PRN Constipation  traMADol 50 milliGRAM(s) Oral every 4 hours PRN Mild Pain (1 - 3)      LABS:                        10.6   13.9  )-----------( 222      ( 03 Sep 2019 17:25 )             31.3     09-03    136  |  105  |  45<H>  ----------------------------<  192<H>  4.8   |  16<L>  |  1.98<H>    Ca    7.0<L>      03 Sep 2019 17:25      PT/INR - ( 03 Sep 2019 05:09 )   PT: 14.0 sec;   INR: 1.21 ratio         PTT - ( 03 Sep 2019 05:09 )  PTT:31.4 sec      RADIOLOGY & ADDITIONAL STUDIES:    PHYSICAL EXAM:      Constitutional: NAD, A&O x 3  Respiratory: non labor breathing   Cardiovascular: tachycardic   Gastrointestinal: abd soft, ND, mild tenderness around incision at left lower abd. incision with dressing in place, no strikethrough bleeding   Vascular: + palpable femoral pulses b/l     A/P: 68y Male Spinal stenosis of lumbosacral region    - Tachycardia: 110's. pain control. will CBC r/o bleeding   - Diet: regular   - Activity: OOB as tolerated   - Pain medication: tylenol, oxycodone, prn   - DVT ppx: per primary team    Vascular Surgery   [id number]

## 2019-09-04 NOTE — PROGRESS NOTE ADULT - PROBLEM SELECTOR PLAN 1
-Test BG ac and hs   -C/w Lantus dose  25 units tonight   -Humalog 8 units ac meals   -Continue low dose Humalog correction scales ac and hs  -Plan discussed with pt's daughter/team.  Contact info: [phone] (24/7). pager [phone]

## 2019-09-04 NOTE — PROVIDER CONTACT NOTE (OTHER) - SITUATION
pt orthostatic when OOB, BP went from 158/91 laying to 111/71 sitting to 108/67 standing, then dropped to 68/29 while sitting in chair, pt assisted back to bed and BP rechecked at 111/75

## 2019-09-04 NOTE — PROGRESS NOTE ADULT - ASSESSMENT
68M T11-S1 Lami/Fusion POD 8 (8/27/19), ALIF 9/3 POD #1. Recovering appropriately on floor, Drains and dressing with appropriate output and appearance    - Continue dressing  - continue drain  - Care per primary, Orthopedic surgery    Plastic surgery  [phone]

## 2019-09-04 NOTE — PROGRESS NOTE ADULT - SUBJECTIVE AND OBJECTIVE BOX
DIABETES FOLLOW UP NOTE: Saw pt earlier today  INTERVAL HX: 67 y/o M w/h/o uncontrolled  T2DM>on insulin basal/bolus PTA. DM c/b CAD> s/p CABG and  CKD3. H/o spinal stenosis s/p T10-L4 Laminectomy in 2016  now admitted fro revision of laminectomy and spinal fusion on 8/27 and requiring anterior lumbar interbody fusion 9/3/19. Pt tolerating POs with FBG at goal as he is starting to eat again. C/o surgical pain. Daughter at bedside assiting with lunch. Eating food from home and hospital. No hypoglycemia    Review of Systems:  General: Positive surgical pain.    Cardiovascular: No chest pain, palpitations  Respiratory: No SOB, no cough  GI: No nausea, vomiting, abdominal pain  Endocrine: no polyuria, polydipsia or S&Sx of hypoglycemia    Allergies    No Known Allergies    Intolerances      MEDICATIONS:  atorvastatin 10 milliGRAM(s) Oral at bedtime  cholecalciferol 2000 Unit(s) Oral daily  finasteride 5 milliGRAM(s) Oral daily  ceFAZolin   IVPB 2000 milliGRAM(s) IV Intermittent every 8 hours  insulin glargine Injectable (LANTUS) 25 Unit(s) SubCutaneous at bedtime  insulin lispro (HumaLOG) corrective regimen sliding scale   SubCutaneous three times a day before meals  insulin lispro (HumaLOG) corrective regimen sliding scale   SubCutaneous at bedtime  insulin lispro Injectable (HumaLOG) 7 Unit(s) SubCutaneous three times a day before meals    PHYSICAL EXAM:  Vital Signs Last 24 Hrs  T(C): 37.5 (09-04-19 @ 08:52), Max: 37.8 (09-03-19 @ 22:45)  T(F): 99.5 (09-04-19 @ 08:52), Max: 100 (09-03-19 @ 22:45)  HR: 101 (09-04-19 @ 12:31) (95 - 115)  BP: 111/65 (09-04-19 @ 12:31) (68/29 - 169/95)  BP(mean): 106 (09-03-19 @ 19:15) (106 - 115)  RR: 18 (09-04-19 @ 08:52) (16 - 18)  SpO2: 96% (09-04-19 @ 08:52) (95% - 99%)  Height (cm): 162.56 (09-03-19 @ 08:20)  Weight (kg): 74.8 (09-03-19 @ 08:20)  BMI (kg/m2): 28.3 (09-03-19 @ 08:20)  GENERAL: Male laying in bed in NAD. Daughter at bedside  Abdomen: Soft, nontender, non distended. Obese, Abdominal dressing D/I  Extremities: Warm, no edema in all 4 exts. Venodyne boots on  NEURO: A&Ox3    LABS:  POCT Blood Glucose.: 185 mg/dL (09-04-19 @ 17:10)  POCT Blood Glucose.: 162 mg/dL (09-04-19 @ 13:26)  POCT Blood Glucose.: 146 mg/dL (09-04-19 @ 08:29)  POCT Blood Glucose.: 272 mg/dL (09-03-19 @ 21:41)  POCT Blood Glucose.: 192 mg/dL (09-03-19 @ 17:51)  POCT Blood Glucose.: 159 mg/dL (09-02-19 @ 21:35)                          10.2   17.19 )-----------( 249      ( 04 Sep 2019 09:35 )             30.2                           8.0    11.2  )-----------( 260      ( 03 Sep 2019 05:09 )             23.2     09-04    134<L>  |  100  |  38<H>  ----------------------------<  149<H>  4.3   |  22  |  1.99<H>    Ca    8.2<L>      04 Sep 2019 07:29    	  09-03    131<L>  |  98  |  54<H>  ----------------------------<  117<H>  4.2   |  20<L>  |  2.23<H>    EGFR if non : 29<L>    Ca    8.5      09-03      Hemoglobin A1C, Whole Blood: 9.4 % <H> [4.0 - 5.6] (08-14-19 @ 00:28)      08-29 Chol 75 LDL 17 HDL 29<L> Trig 143

## 2019-09-05 ENCOUNTER — TRANSCRIPTION ENCOUNTER (OUTPATIENT)
Age: 69
End: 2019-09-05

## 2019-09-05 LAB
ANION GAP SERPL CALC-SCNC: 13 MMOL/L — SIGNIFICANT CHANGE UP (ref 5–17)
BASOPHILS # BLD AUTO: 0.03 K/UL — SIGNIFICANT CHANGE UP (ref 0–0.2)
BASOPHILS NFR BLD AUTO: 0.2 % — SIGNIFICANT CHANGE UP (ref 0–2)
BUN SERPL-MCNC: 32 MG/DL — HIGH (ref 7–23)
CALCIUM SERPL-MCNC: 8.3 MG/DL — LOW (ref 8.4–10.5)
CHLORIDE SERPL-SCNC: 98 MMOL/L — SIGNIFICANT CHANGE UP (ref 96–108)
CO2 SERPL-SCNC: 20 MMOL/L — LOW (ref 22–31)
CREAT SERPL-MCNC: 1.66 MG/DL — HIGH (ref 0.5–1.3)
EOSINOPHIL # BLD AUTO: 0.03 K/UL — SIGNIFICANT CHANGE UP (ref 0–0.5)
EOSINOPHIL NFR BLD AUTO: 0.2 % — SIGNIFICANT CHANGE UP (ref 0–6)
GLUCOSE BLDC GLUCOMTR-MCNC: 153 MG/DL — HIGH (ref 70–99)
GLUCOSE BLDC GLUCOMTR-MCNC: 159 MG/DL — HIGH (ref 70–99)
GLUCOSE BLDC GLUCOMTR-MCNC: 181 MG/DL — HIGH (ref 70–99)
GLUCOSE BLDC GLUCOMTR-MCNC: 198 MG/DL — HIGH (ref 70–99)
GLUCOSE SERPL-MCNC: 151 MG/DL — HIGH (ref 70–99)
HCT VFR BLD CALC: 28.2 % — LOW (ref 39–50)
HGB BLD-MCNC: 9.3 G/DL — LOW (ref 13–17)
IMM GRANULOCYTES NFR BLD AUTO: 1.1 % — SIGNIFICANT CHANGE UP (ref 0–1.5)
LYMPHOCYTES # BLD AUTO: 0.55 K/UL — LOW (ref 1–3.3)
LYMPHOCYTES # BLD AUTO: 3.2 % — LOW (ref 13–44)
MCHC RBC-ENTMCNC: 28.2 PG — SIGNIFICANT CHANGE UP (ref 27–34)
MCHC RBC-ENTMCNC: 33 GM/DL — SIGNIFICANT CHANGE UP (ref 32–36)
MCV RBC AUTO: 85.5 FL — SIGNIFICANT CHANGE UP (ref 80–100)
MONOCYTES # BLD AUTO: 1.48 K/UL — HIGH (ref 0–0.9)
MONOCYTES NFR BLD AUTO: 8.5 % — SIGNIFICANT CHANGE UP (ref 2–14)
NEUTROPHILS # BLD AUTO: 15.06 K/UL — HIGH (ref 1.8–7.4)
NEUTROPHILS NFR BLD AUTO: 86.8 % — HIGH (ref 43–77)
PLATELET # BLD AUTO: 259 K/UL — SIGNIFICANT CHANGE UP (ref 150–400)
POTASSIUM SERPL-MCNC: 4.1 MMOL/L — SIGNIFICANT CHANGE UP (ref 3.5–5.3)
POTASSIUM SERPL-SCNC: 4.1 MMOL/L — SIGNIFICANT CHANGE UP (ref 3.5–5.3)
RBC # BLD: 3.3 M/UL — LOW (ref 4.2–5.8)
RBC # FLD: 13.6 % — SIGNIFICANT CHANGE UP (ref 10.3–14.5)
SODIUM SERPL-SCNC: 131 MMOL/L — LOW (ref 135–145)
WBC # BLD: 17.34 K/UL — HIGH (ref 3.8–10.5)
WBC # FLD AUTO: 17.34 K/UL — HIGH (ref 3.8–10.5)

## 2019-09-05 PROCEDURE — 99232 SBSQ HOSP IP/OBS MODERATE 35: CPT

## 2019-09-05 RX ORDER — LACTULOSE 10 G/15ML
10 SOLUTION ORAL EVERY 6 HOURS
Refills: 0 | Status: DISCONTINUED | OUTPATIENT
Start: 2019-09-05 | End: 2019-09-11

## 2019-09-05 RX ADMIN — Medication 100 MILLIGRAM(S): at 13:34

## 2019-09-05 RX ADMIN — FINASTERIDE 5 MILLIGRAM(S): 5 TABLET, FILM COATED ORAL at 11:57

## 2019-09-05 RX ADMIN — LOSARTAN POTASSIUM 25 MILLIGRAM(S): 100 TABLET, FILM COATED ORAL at 05:56

## 2019-09-05 RX ADMIN — OXYCODONE HYDROCHLORIDE 10 MILLIGRAM(S): 5 TABLET ORAL at 11:58

## 2019-09-05 RX ADMIN — Medication 8 UNIT(S): at 18:18

## 2019-09-05 RX ADMIN — OXYCODONE HYDROCHLORIDE 10 MILLIGRAM(S): 5 TABLET ORAL at 04:42

## 2019-09-05 RX ADMIN — Medication 100 MILLIGRAM(S): at 21:56

## 2019-09-05 RX ADMIN — OXYCODONE HYDROCHLORIDE 10 MILLIGRAM(S): 5 TABLET ORAL at 00:14

## 2019-09-05 RX ADMIN — PANTOPRAZOLE SODIUM 40 MILLIGRAM(S): 20 TABLET, DELAYED RELEASE ORAL at 05:56

## 2019-09-05 RX ADMIN — Medication 100 MILLIGRAM(S): at 06:00

## 2019-09-05 RX ADMIN — Medication 8 UNIT(S): at 11:57

## 2019-09-05 RX ADMIN — Medication 100 MILLIGRAM(S): at 06:07

## 2019-09-05 RX ADMIN — Medication 1: at 11:58

## 2019-09-05 RX ADMIN — Medication 2000 UNIT(S): at 11:56

## 2019-09-05 RX ADMIN — LACTULOSE 10 GRAM(S): 10 SOLUTION ORAL at 21:53

## 2019-09-05 RX ADMIN — Medication 100 MILLIGRAM(S): at 21:53

## 2019-09-05 RX ADMIN — OXYCODONE HYDROCHLORIDE 10 MILLIGRAM(S): 5 TABLET ORAL at 18:23

## 2019-09-05 RX ADMIN — SENNA PLUS 2 TABLET(S): 8.6 TABLET ORAL at 21:56

## 2019-09-05 RX ADMIN — Medication 1: at 08:14

## 2019-09-05 RX ADMIN — LACTULOSE 10 GRAM(S): 10 SOLUTION ORAL at 18:17

## 2019-09-05 RX ADMIN — TAMSULOSIN HYDROCHLORIDE 0.4 MILLIGRAM(S): 0.4 CAPSULE ORAL at 05:56

## 2019-09-05 RX ADMIN — Medication 8 UNIT(S): at 08:14

## 2019-09-05 RX ADMIN — TAMSULOSIN HYDROCHLORIDE 0.4 MILLIGRAM(S): 0.4 CAPSULE ORAL at 18:16

## 2019-09-05 RX ADMIN — OXYCODONE HYDROCHLORIDE 10 MILLIGRAM(S): 5 TABLET ORAL at 05:20

## 2019-09-05 RX ADMIN — ATORVASTATIN CALCIUM 10 MILLIGRAM(S): 80 TABLET, FILM COATED ORAL at 21:56

## 2019-09-05 RX ADMIN — INSULIN GLARGINE 25 UNIT(S): 100 INJECTION, SOLUTION SUBCUTANEOUS at 21:57

## 2019-09-05 RX ADMIN — Medication 100 MILLIGRAM(S): at 05:56

## 2019-09-05 RX ADMIN — Medication 325 MILLIGRAM(S): at 11:56

## 2019-09-05 RX ADMIN — Medication 20 MILLIGRAM(S): at 05:56

## 2019-09-05 RX ADMIN — LACTULOSE 10 GRAM(S): 10 SOLUTION ORAL at 11:57

## 2019-09-05 RX ADMIN — OXYCODONE HYDROCHLORIDE 10 MILLIGRAM(S): 5 TABLET ORAL at 12:28

## 2019-09-05 RX ADMIN — Medication 100 MILLIGRAM(S): at 18:18

## 2019-09-05 RX ADMIN — OXYCODONE HYDROCHLORIDE 10 MILLIGRAM(S): 5 TABLET ORAL at 01:00

## 2019-09-05 RX ADMIN — OXYCODONE HYDROCHLORIDE 10 MILLIGRAM(S): 5 TABLET ORAL at 19:00

## 2019-09-05 RX ADMIN — Medication 1: at 18:18

## 2019-09-05 NOTE — OCCUPATIONAL THERAPY INITIAL EVALUATION ADULT - RANGE OF MOTION EXAMINATION, UPPER EXTREMITY
bilateral UE Active ROM was WFL  (within functional limits)/except L shoulder AAROM to ~150*, AROM to ~30* shoulder flexion
bilateral UE Active ROM was WFL  (within functional limits)

## 2019-09-05 NOTE — PHYSICAL THERAPY INITIAL EVALUATION ADULT - BALANCE TRAINING, PT EVAL
GOAL: Pt's static/dynamic sitting/standing balance will improve to good to increase stability, and decrease risk for falls when ambulating or performing ADL's
GOAL: Pt will increase static/dynamic standing balance by 1/2 grade in 2wks to decrease fall risk and increase independence with ADLs

## 2019-09-05 NOTE — PHYSICAL THERAPY INITIAL EVALUATION ADULT - LEVEL OF INDEPENDENCE: GAIT, REHAB EVAL
moderate assist (50% patients effort)/minimum assist (75% patients effort)
minimum assist (75% patients effort)/contact guard

## 2019-09-05 NOTE — PROGRESS NOTE ADULT - SUBJECTIVE AND OBJECTIVE BOX
Post op Day [2 ]    Patient resting, c/o appropriate abdominal surgical pain.  Shoulder pain improved s/p injection of L shoulder.  No chest pain, SOB, N/V.  Had BM yesterday per patient.    T(C): 37 (09-05-19 @ 05:53), Max: 37.7 (09-05-19 @ 00:06)  HR: 97 (09-05-19 @ 05:53) (95 - 106)  BP: 127/81 (09-05-19 @ 05:53) (68/29 - 175/94)  RR: 18 (09-05-19 @ 05:31) (18 - 18)  SpO2: 92% (09-05-19 @ 05:31) (92% - 97%)  Wt(kg): --    Exam:  Alert and Oriented, No Acute Distress  Grossly moving upper extremities  Back dsg c/d/i with sherri drain serosang drainage  Abdominal dsg c/d/i, mild abdominal gaseous distention, non-tender abdomen  Calves Soft, Non-tender bilaterally  +PF/DF/EHL/FHL 5/5 RLE, 4+/5 EHL/DF, 5/5 PF/FHL  SILT bilat                        10.2   17.19 )-----------( 249      ( 04 Sep 2019 09:35 )             30.2    09-04    134<L>  |  100  |  38<H>  ----------------------------<  149<H>  4.3   |  22  |  1.99<H>    Ca    8.2<L>      04 Sep 2019 07:29

## 2019-09-05 NOTE — OCCUPATIONAL THERAPY INITIAL EVALUATION ADULT - PRECAUTIONS/LIMITATIONS, REHAB EVAL
fall precautions/s/p Fusion of 7 to 12 spinal segments by posterior approach,Revision, laminectomy, lumbar, 6 levels and Re-exploration of lumbar  laminectomy site 8/27./spinal precautions

## 2019-09-05 NOTE — DISCHARGE NOTE PROVIDER - CARE PROVIDERS DIRECT ADDRESSES
,[email or web address],DirectAddress_Unknown,DirectAddress_Unknown

## 2019-09-05 NOTE — OCCUPATIONAL THERAPY INITIAL EVALUATION ADULT - BED MOBILITY TRAINING, PT EVAL
GOAL: Pt will perform bed mobility independent in 4 weeks
GOAL: Pt will perform bed mobility independent in 4 weeks

## 2019-09-05 NOTE — OCCUPATIONAL THERAPY INITIAL EVALUATION ADULT - PHYSICAL ASSIST/NONPHYSICAL ASSIST: STAND/SIT, REHAB EVAL
verbal cues/nonverbal cues (demo/gestures)/1 person assist
verbal cues/2 person assist/nonverbal cues (demo/gestures)

## 2019-09-05 NOTE — DISCHARGE NOTE PROVIDER - CARE PROVIDER_API CALL
Spencer Koehler)  Orthopedics  [address], [address]  [place] [address]  Phone: [phone]  Fax: [phone]  Follow Up Time:     Eddy Montiel)  Vascular Surgery  [address], [address] [place] [address]  Phone: [phone]  Fax: [phone]  Follow Up Time:     Monty Horne)  Plastic Surgery; Surgery of the Hand  [address]  [place] [address]  Phone: [phone]  Fax: [phone]  Follow Up Time:     Loc Singh)  Medicine  [address] [place] [address]  Phone: [phone]  Fax: [phone]  Follow Up Time:

## 2019-09-05 NOTE — OCCUPATIONAL THERAPY INITIAL EVALUATION ADULT - BALANCE DISTURBANCE, IDENTIFIED IMPAIRMENT CONTRIBUTE, REHAB EVAL
decreased ROM/decreased strength/impaired postural control/impaired motor control
decreased ROM/impaired postural control/impaired motor control/decreased strength

## 2019-09-05 NOTE — PHYSICAL THERAPY INITIAL EVALUATION ADULT - ORIENTATION, REHAB EVAL
oriented to person, place, time and situation/Speaks Singaporean
oriented to person, place, time and situation

## 2019-09-05 NOTE — OCCUPATIONAL THERAPY INITIAL EVALUATION ADULT - STRENGTHENING, PT EVAL
GOAL: Pt will improve bilateral UE/LE strength to 5/5 to increase independence in ADLs within 4 weeks
GOAL: Pt will improve bilateral UE/LE strength to 5/5 to increase independence in ADLs within 4 weeks

## 2019-09-05 NOTE — OCCUPATIONAL THERAPY INITIAL EVALUATION ADULT - BALANCE TRAINING, PT EVAL
GOAL: Pt will demonstrate improved static/dynamic balance to good in order to increase safety and independence in ADLs within 4 weeks
GOAL: Pt will demonstrate improved static/dynamic balance to good in order to increase safety and independence in ADLs within 4 weeks

## 2019-09-05 NOTE — OCCUPATIONAL THERAPY INITIAL EVALUATION ADULT - ADL RETRAINING, OT EVAL
GOAL: Pt will be independent with lower body dressing in 4 weeks.
GOAL : Pt will be independent with lower body dressing in 4 weeks.

## 2019-09-05 NOTE — OCCUPATIONAL THERAPY INITIAL EVALUATION ADULT - PLANNED THERAPY INTERVENTIONS, OT EVAL
ADL retraining/balance training/transfer training/bed mobility training/strengthening
bed mobility training/ROM/strengthening/transfer training/ADL retraining/balance training

## 2019-09-05 NOTE — OCCUPATIONAL THERAPY INITIAL EVALUATION ADULT - PHYSICAL ASSIST/NONPHYSICAL ASSIST: BED TO CHAIR, REHAB EVAL
1 person assist/verbal cues/nonverbal cues (demo/gestures)
2 person assist/verbal cues/nonverbal cues (demo/gestures)

## 2019-09-05 NOTE — PROGRESS NOTE ADULT - SUBJECTIVE AND OBJECTIVE BOX
Plastic Surgery     SUBJECTIVE:  The patient was seen and examined. No acute events overnight. Pain controlled.    OBJECTIVE:   Vital Signs Last 24 Hrs  T(C): 37 (05 Sep 2019 05:53), Max: 37.7 (05 Sep 2019 00:06)  T(F): 98.6 (05 Sep 2019 05:53), Max: 99.9 (05 Sep 2019 00:06)  HR: 97 (05 Sep 2019 05:53) (95 - 106)  BP: 127/81 (05 Sep 2019 05:53) (68/29 - 175/94)  BP(mean): --  RR: 18 (05 Sep 2019 05:31) (18 - 18)  SpO2: 92% (05 Sep 2019 05:31) (92% - 97%)    General: NAD, Lying in bed comfortably  Back: No collections, drain sites CDI, dressing CDI without strikethrough. Remaining [facility]    09-04    134<L>  |  100  |  38<H>  ----------------------------<  149<H>  4.3   |  22  |  1.99<H>    Ca    8.2<L>      04 Sep 2019 07:29                          10.2   17.19 )-----------( 249      ( 04 Sep 2019 09:35 )             30.2

## 2019-09-05 NOTE — DISCHARGE NOTE PROVIDER - NSDCACTIVITY_GEN_ALL_CORE
sponge bath only/Do not drive or operate machinery/Stairs allowed/Walking - Indoors allowed/No heavy lifting/straining/Do not make important decisions/Walking - Outdoors allowed

## 2019-09-05 NOTE — PHYSICAL THERAPY INITIAL EVALUATION ADULT - GENERAL OBSERVATIONS, REHAB EVAL
Received in bed, NAD. Very uncomfortable, constipated
pt received semi-supine in bed with c/o incisional pain controlled by PCA pump, +vendoynes, IV, continuous pulse ox/HR monitor, 2 hemovacs,1 ELBERT drain, valdes catheter

## 2019-09-05 NOTE — OCCUPATIONAL THERAPY INITIAL EVALUATION ADULT - DIAGNOSIS, OT EVAL
Pt demonstrated decreased strength, balance, ROM impacting pt's ability to participate in functional mobility and ADLs.
Pt demonstrated decreased strength, balance, ROM impacting pt's ability to participate in functional mobility and ADLs.

## 2019-09-05 NOTE — DISCHARGE NOTE PROVIDER - HOSPITAL COURSE
History of Present Illness:    History of Present Illness        67 yo Citizen of [place] speaking male from [facility], PMH CAD s/p CABG X 4 2015, CHF - no recent events, DM2, BPH s/p TURP 2017, hospitalized in 3/2019 with UTI 2/2 urinary retention - following up with urology, spinal stenosis s/p T10-L4 laminectomy and fusion 5/2016, chronic L2 fracture, loosening screws. Pt. still having low back pain that radiates to bilateral lower extremities R>L, has been treated with PT, NSAID's, APRYL without much relief - pt. presents to PST today for scheduled T10-Pelvis Posterior Revision Laminectomy and Spinal Fusion on 8/27/19. Pt. with SAINI - which is unchanged for many years, denies recent fever, chills, chest pain, SOB, changes in bowel/urinary habits.    Obtained last EKG and Echo from cardiologist office - in chart (EF 50-54%)    Pt. will continue on aspirin during reynaldo-op period, pt. will see PCP pre-op        Allergies/Medications:     Allergies:          Allergies:    	No Known Allergies:         Home Medications:     * Patient Currently Takes Medications as of 13-Aug-2019 17:34 documented in Structured Notes    · 	HumaLOG 100 units/mL subcutaneous solution: Last Dose Taken:  , 10 unit(s) subcutaneous 3 times a day (before meals)    · 	aspirin 81 mg oral delayed release tablet: Last Dose Taken:  , 1 tab(s) orally once a day (at bedtime)    · 	tamsulosin 0.4 mg oral capsule: Last Dose Taken:  , 1 cap(s) orally 2 times a day    · 	traMADol 50 mg oral tablet: 1 tab(s) orally 2 times a day, As Needed    · 	Lyrica 100 mg oral capsule: 1 cap(s) orally 2 times a day    · 	pantoprazole 40 mg oral delayed release tablet: 1 tab(s) orally once a day    · 	Vitamin D3 2000 intl units oral tablet: 1 tab(s) orally once a day    · 	oxycodone-acetaminophen 5 mg-325 mg oral tablet: 1 tab(s) orally 3 times a day, As Needed    · 	losartan 25 mg oral tablet: 1 tab(s) orally once a day    · 	atorvastatin 10 mg oral tablet: Last Dose Taken:  , 1 tab(s) orally once a day (at bedtime)    · 	ferrous sulfate 325 mg (65 mg elemental iron) oral tablet: Last Dose Taken:  , 1 tab(s) orally once a day    · 	finasteride 5 mg oral tablet: 1 tab(s) orally once a day (at bedtime)    · 	Basaglar KwikPen 100 units/mL subcutaneous solution: Last Dose Taken:  , 40 unit(s) subcutaneous once a day (at bedtime). Pt. will decrease to 32 units night before procedure    · 	furosemide 20 mg oral tablet: Last Dose Taken:  , 1 tab(s) orally once a day (at bedtime)          Past Medical, Past Surgical, and Family History:    PAST MEDICAL HISTORY:    Acute UTI 3/2019 2/2 urinary retention 2/2 BPH    Benign prostatic hypertrophy     Chronic low back pain     Coronary artery disease involving native coronary artery of native heart without angina pectoris     Dyslipidemia     ETOH abuse last drink 2015    Hypertension     LBBB (left bundle branch block) on EKG    Lumbar compression fracture chronic L2 fracture    Lumbar disc disease     Transient cerebral ischemia, unspecified type 10/2015    Type 2 diabetes mellitus.         PAST SURGICAL HISTORY:    S/P lumbar laminectomy 5/2016    S/P TURP 2017    Status post coronary artery bypass graft x4  10/8/2015.        Hospital Course:    Patient admitted to [facility] for elective spine surgery.    Underwent T11-S1 Laminectomy/Fusion on 8/27/19 without complications.     On 9/3/19 patient returned to the operating room for L4-5 and L5-S1 Anterior lumbar interbody fusion    without complications. Postoperatively patients Taco Crandall drain output monitored and discontinued by Plastic Surgery when output minimal. Evaluated and treated by Physical Therapy whom recommended Rehab for discharge disposition.     Patient to be discharged to Rehab when bed available. History of Present Illness:    History of Present Illness        69 yo [place] speaking male from [facility], PMH CAD s/p CABG X 4 2015, CHF - no recent events, DM2, BPH s/p TURP 2017, hospitalized in 3/2019 with UTI 2/2 urinary retention - following up with urology, spinal stenosis s/p T10-L4 laminectomy and fusion 5/2016, chronic L2 fracture, loosening screws. Pt. still having low back pain that radiates to bilateral lower extremities R>L, has been treated with PT, NSAID's, APRYL without much relief - pt. presents to PST today for scheduled T10-Pelvis Posterior Revision Laminectomy and Spinal Fusion on 8/27/19. Pt. with SAINI - which is unchanged for many years, denies recent fever, chills, chest pain, SOB, changes in bowel/urinary habits.    Obtained last EKG and Echo from cardiologist office - in chart (EF 50-54%)    Pt. will continue on aspirin during reynaldo-op period, pt. will see PCP pre-op        Allergies/Medications:     Allergies:          Allergies:    	No Known Allergies:         Home Medications:     * Patient Currently Takes Medications as of 13-Aug-2019 17:34 documented in Structured Notes    · 	HumaLOG 100 units/mL subcutaneous solution: Last Dose Taken:  , 10 unit(s) subcutaneous 3 times a day (before meals)    · 	aspirin 81 mg oral delayed release tablet: Last Dose Taken:  , 1 tab(s) orally once a day (at bedtime)    · 	tamsulosin 0.4 mg oral capsule: Last Dose Taken:  , 1 cap(s) orally 2 times a day    · 	traMADol 50 mg oral tablet: 1 tab(s) orally 2 times a day, As Needed    · 	Lyrica 100 mg oral capsule: 1 cap(s) orally 2 times a day    · 	pantoprazole 40 mg oral delayed release tablet: 1 tab(s) orally once a day    · 	Vitamin D3 2000 intl units oral tablet: 1 tab(s) orally once a day    · 	oxycodone-acetaminophen 5 mg-325 mg oral tablet: 1 tab(s) orally 3 times a day, As Needed    · 	losartan 25 mg oral tablet: 1 tab(s) orally once a day    · 	atorvastatin 10 mg oral tablet: Last Dose Taken:  , 1 tab(s) orally once a day (at bedtime)    · 	ferrous sulfate 325 mg (65 mg elemental iron) oral tablet: Last Dose Taken:  , 1 tab(s) orally once a day    · 	finasteride 5 mg oral tablet: 1 tab(s) orally once a day (at bedtime)    · 	Basaglar KwikPen 100 units/mL subcutaneous solution: Last Dose Taken:  , 40 unit(s) subcutaneous once a day (at bedtime). Pt. will decrease to 32 units night before procedure    · 	furosemide 20 mg oral tablet: Last Dose Taken:  , 1 tab(s) orally once a day (at bedtime)          Past Medical, Past Surgical, and Family History:    PAST MEDICAL HISTORY:    Acute UTI 3/2019 2/2 urinary retention 2/2 BPH    Benign prostatic hypertrophy     Chronic low back pain     Coronary artery disease involving native coronary artery of native heart without angina pectoris     Dyslipidemia     ETOH abuse last drink 2015    Hypertension     LBBB (left bundle branch block) on EKG    Lumbar compression fracture chronic L2 fracture    Lumbar disc disease     Transient cerebral ischemia, unspecified type 10/2015    Type 2 diabetes mellitus.         PAST SURGICAL HISTORY:    S/P lumbar laminectomy 5/2016    S/P TURP 2017    Status post coronary artery bypass graft x4  10/8/2015.        Hospital Course:    Patient admitted to [facility] for elective spine surgery.    Underwent T11-S1 Laminectomy/Fusion on 8/27/19 without complications.     On 9/3/19 patient returned to the operating room for L4-5 and L5-S1 Anterior lumbar interbody fusion.  Ptient had complications of ilieus treated by vascular (npo/IVF)  Ilieus resolved.     Postoperatively patients Taco Crandall drain output monitored and discontinued by Plastic Surgery when output minimal. Evaluated and treated by Physical Therapy whom recommended Acute Rehab for discharge disposition.     Patient to be discharged to Rehab when bed available.         Valdes insertion by urology for retention.  Leave valdes until patient ambulating better

## 2019-09-05 NOTE — PHYSICAL THERAPY INITIAL EVALUATION ADULT - GAIT DEVIATIONS NOTED, PT EVAL
decreased bel/decreased stride length/decreased weight-shifting ability/decreased step length/unsteady gait, wide REGINO, + chair follow
decreased step length/decreased weight-shifting ability/decreased bel

## 2019-09-05 NOTE — PHYSICAL THERAPY INITIAL EVALUATION ADULT - GAIT TRAINING, PT EVAL
GOAL: Pt will ambulate independently 200 feet x1 with appropriate assistive device in 4 weeks
GOAL:Pt will ambulate 100ft with least restrictive device independently in 2wks. .

## 2019-09-05 NOTE — OCCUPATIONAL THERAPY INITIAL EVALUATION ADULT - ADDITIONAL COMMENTS
CT Spine: Redemonstration of L2 total laminectomy and posterior fusion of T11-L4 as described.Lucency surrounds the bilateral L4 pedicle screws, to 4 mm on the left and 3 mm on the right, consistent with aseptic or septic hardware loosening. There is no evidence for hardware fracture.Redemonstration of marked comminuted compression fracture of L2. Similar retropulsed bone occupies 40% of the spinal canal diameter.Clumping of the nerve roots at the L2-L3 level, and to a lesser degree at the L4-L5 level. This is similar in appearance to MRI from 7/13/2018, given differences in modality. Findings may represent the presence of arachnoiditis. The presence of leptomeningeal disease is considered much less likely, given the relative stability of the findings.Mild to moderate right neural foraminal narrowing at L5-S1 due to similar appearing foraminal disc protrusion and ligamentous hypertrophy.
CT Spine: Redemonstration of L2 total laminectomy and posterior fusion of T11-L4 as described.Lucency surrounds the bilateral L4 pedicle screws, to 4 mm on the left and 3 mm on the right, consistent with aseptic or septic hardware loosening. There is no evidence for hardware fracture.Redemonstration of marked comminuted compression fracture of L2. Similar retropulsed bone occupies 40% of the spinal canal diameter.Clumping of the nerve roots at the L2-L3 level, and to a lesser degree at the L4-L5 level. This is similar in appearance to MRI from 7/13/2018, given differences in modality. Findings may represent the presence of arachnoiditis. The presence of leptomeningeal disease is considered much less likely, given the relative stability of the findings.Mild to moderate right neural foraminal narrowing at L5-S1 due to similar appearing foraminal disc protrusion and ligamentous hypertrophy.

## 2019-09-05 NOTE — PHYSICAL THERAPY INITIAL EVALUATION ADULT - PRECAUTIONS/LIMITATIONS, REHAB EVAL
Obtained last EKG and Echo from cardiologist office - in chart (EF 50-54%). CXR: No radiographic evidence of mechanical bowel obstruction.Mild air-filled gastric distention./spinal precautions/fall precautions/surgical precautions
surgical precautions/fall precautions/spinal precautions/Obtained last EKG and Echo from cardiologist office - in chart (EF 50-54%). CXR: No radiographic evidence of mechanical bowel obstruction.Mild air-filled gastric distention.

## 2019-09-05 NOTE — OCCUPATIONAL THERAPY INITIAL EVALUATION ADULT - BED MOBILITY LIMITATIONS, REHAB EVAL
decreased ability to use arms for pushing/pulling/decreased ability to use legs for bridging/pushing
decreased ability to use legs for bridging/pushing/decreased ability to use arms for pushing/pulling/impaired ability to control trunk for mobility

## 2019-09-05 NOTE — PROGRESS NOTE ADULT - PROBLEM SELECTOR PLAN 1
PT/OT-WBAT, brace for support  IS  DVT PPx-venodynes  Pain Control  Continue Current Tx.  Lactulose to facilitate bowel movements    Johann Guzmán PA-C  Team Pager: #[phone]

## 2019-09-05 NOTE — OCCUPATIONAL THERAPY INITIAL EVALUATION ADULT - IMPAIRED TRANSFERS: SIT/STAND, REHAB EVAL
impaired postural control/impaired motor control/decreased ROM/decreased strength
decreased ROM/decreased strength/impaired balance/impaired motor control/impaired postural control

## 2019-09-05 NOTE — OCCUPATIONAL THERAPY INITIAL EVALUATION ADULT - PHYSICAL ASSIST/NONPHYSICAL ASSIST, REHAB EVAL
supervision/verbal cues/nonverbal cues (demo/gestures)
1 person assist/nonverbal cues (demo/gestures)/verbal cues

## 2019-09-05 NOTE — PROGRESS NOTE ADULT - ASSESSMENT
A/P: 68M s/p T11-S1 Lami/Fusion.    S/p T11-S1 Lami/Fusion:S/p ALIF     Ortho spine f/up noted.  Pain control through Dilaudid PCA      DM II:  Lantus/Humalog/FSSS  Endo f/up noted.    Lt shoulder pain:  S/p steroid injection      HTN:  Cw lasix/Losartan    CKD IV:  BMP/Cr stable

## 2019-09-05 NOTE — PHYSICAL THERAPY INITIAL EVALUATION ADULT - IMPAIRED TRANSFERS: SIT/STAND, REHAB EVAL
decreased strength/pain/decreased ROM/impaired balance
impaired balance/pain/decreased ROM/decreased strength

## 2019-09-05 NOTE — DISCHARGE NOTE PROVIDER - PROVIDER TOKENS
PROVIDER:[TOKEN:[[id number]]],PROVIDER:[TOKEN:[[id number]]],PROVIDER:[TOKEN:[[id number]]],PROVIDER:[TOKEN:[[id number]]]

## 2019-09-05 NOTE — OCCUPATIONAL THERAPY INITIAL EVALUATION ADULT - PHYSICAL ASSIST/NONPHYSICAL ASSIST: SIT/STAND, REHAB EVAL
1 person assist/verbal cues/nonverbal cues (demo/gestures)
verbal cues/2 person assist/nonverbal cues (demo/gestures)

## 2019-09-05 NOTE — OCCUPATIONAL THERAPY INITIAL EVALUATION ADULT - IMPAIRMENTS CONTRIBUTING IMPAIRED BED MOBILITY, REHAB EVAL
decreased ROM/impaired balance/impaired motor control/impaired postural control/decreased strength
decreased ROM/impaired postural control/impaired balance/impaired motor control/pain/decreased strength

## 2019-09-05 NOTE — PHYSICAL THERAPY INITIAL EVALUATION ADULT - ADDITIONAL COMMENTS
Pt lives with son and wife in private home. Few steps to enter, first floor set-up inside home. Prior to admission pt was independent with all functional mobility and did not use an AD to ambulate. Pt owns RW and SC however was not using any AD.
Pt lives with son and wife in private home. Few steps to enter, first floor set-up inside home. Prior to admission pt was independent with all functional mobility and did not use an AD to ambulate. Pt owns RW and SC however was not using any AD.

## 2019-09-05 NOTE — PROGRESS NOTE ADULT - ATTENDING COMMENTS
Debbie Robin MD   Pager # [phone]  On evenings and weekends, please call the office at [phone] or page endocrine fellow on call. Please note that this patient may be followed by different provider tomorrow. If no answer, contact the office.

## 2019-09-05 NOTE — PROGRESS NOTE ADULT - SUBJECTIVE AND OBJECTIVE BOX
Patient is a 68y old  Male who presents with a chief complaint of back pain (28 Aug 2019 14:15)      SUBJECTIVE / OVERNIGHT EVENTS:    Events noted.  CONSTITUTIONAL: Rt leg and lt shoulder pain  RESPIRATORY: No cough, wheezing,    CARDIOVASCULAR: No chest pain, palpitations,   GASTROINTESTINAL: No abdominal or epigastric pain.   NEUROLOGICAL: No headaches,     MEDICATIONS  (STANDING):  atorvastatin 10 milliGRAM(s) Oral at bedtime  ceFAZolin   IVPB 2000 milliGRAM(s) IV Intermittent every 8 hours  cholecalciferol 2000 Unit(s) Oral daily  docusate sodium 100 milliGRAM(s) Oral three times a day  ferrous    sulfate 325 milliGRAM(s) Oral daily  finasteride 5 milliGRAM(s) Oral daily  furosemide    Tablet 20 milliGRAM(s) Oral every 24 hours  insulin glargine Injectable (LANTUS) 27 Unit(s) SubCutaneous at bedtime  insulin lispro (HumaLOG) corrective regimen sliding scale   SubCutaneous three times a day before meals  insulin lispro (HumaLOG) corrective regimen sliding scale   SubCutaneous at bedtime  insulin lispro Injectable (HumaLOG) 9 Unit(s) SubCutaneous three times a day with meals  losartan 25 milliGRAM(s) Oral daily  pantoprazole    Tablet 40 milliGRAM(s) Oral before breakfast  pregabalin 100 milliGRAM(s) Oral two times a day  senna 2 Tablet(s) Oral at bedtime  sodium chloride 0.9%. 1000 milliLiter(s) (20 mL/Hr) IV Continuous <Continuous>  tamsulosin 0.4 milliGRAM(s) Oral two times a day    MEDICATIONS  (PRN):  cyclobenzaprine 5 milliGRAM(s) Oral three times a day PRN Muscle Spasm  guaiFENesin    Syrup 100 milliGRAM(s) Oral every 6 hours PRN congestion  magnesium hydroxide Suspension 30 milliLiter(s) Oral daily PRN Constipation  ondansetron Injectable 4 milliGRAM(s) IV Push every 6 hours PRN Nausea  oxyCODONE    IR 10 milliGRAM(s) Oral every 4 hours PRN Severe Pain (7 - 10)  oxyCODONE    IR 5 milliGRAM(s) Oral every 4 hours PRN Moderate Pain (4 - 6)  polyethylene glycol 3350 17 Gram(s) Oral daily PRN Constipation  traMADol 50 milliGRAM(s) Oral every 4 hours PRN Mild Pain (1 - 3)        CAPILLARY BLOOD GLUCOSE      POCT Blood Glucose.: 200 mg/dL (30 Aug 2019 17:25)  POCT Blood Glucose.: 205 mg/dL (30 Aug 2019 12:04)  POCT Blood Glucose.: 138 mg/dL (30 Aug 2019 08:22)  POCT Blood Glucose.: 201 mg/dL (29 Aug 2019 21:41)  POCT Blood Glucose.: 209 mg/dL (29 Aug 2019 18:41)    I&O's Summary    29 Aug 2019 07:01  -  30 Aug 2019 07:00  --------------------------------------------------------  IN: 990 mL / OUT: 3471 mL / NET: -2481 mL    30 Aug 2019 07:01  -  30 Aug 2019 18:16  --------------------------------------------------------  IN: 610 mL / OUT: 940 mL / NET: -330 mL        PHYSICAL EXAM:    NECK: Supple, No JVD  CHEST/LUNG: Clear to auscultation bilaterally; No wheezing.  HEART: Regular rate and rhythm; No murmurs, rubs, or gallops  ABDOMEN: Soft, Nontender, Nondistended; Bowel sounds present  EXTREMITIES:   No edema  NEUROLOGY: AAO       LABS:                        9.9    15.8  )-----------( 132      ( 29 Aug 2019 03:09 )             28.9     08-29    134<L>  |  102  |  27<H>  ----------------------------<  206<H>  4.6   |  21<L>  |  2.09<H>    Ca    8.8      29 Aug 2019 03:09  Phos  3.6     08-29  Mg     1.9     08-29      PT/INR - ( 28 Aug 2019 21:39 )   PT: 14.7 sec;   INR: 1.27 ratio         PTT - ( 28 Aug 2019 21:39 )  PTT:31.5 sec  CARDIAC MARKERS ( 29 Aug 2019 03:09 )  x     / x     / 2284 U/L / x     / 8.3 ng/mL  CARDIAC MARKERS ( 28 Aug 2019 21:39 )  x     / x     / 2565 U/L / x     / 9.8 ng/mL        CAPILLARY BLOOD GLUCOSE      POCT Blood Glucose.: 200 mg/dL (30 Aug 2019 17:25)  POCT Blood Glucose.: 205 mg/dL (30 Aug 2019 12:04)  POCT Blood Glucose.: 138 mg/dL (30 Aug 2019 08:22)  POCT Blood Glucose.: 201 mg/dL (29 Aug 2019 21:41)  POCT Blood Glucose.: 209 mg/dL (29 Aug 2019 18:41)                RADIOLOGY & ADDITIONAL TESTS:    Imaging Personally Reviewed:    Consultant(s) Notes Reviewed:      Care Discussed with Consultants/Other Providers:

## 2019-09-05 NOTE — PROGRESS NOTE ADULT - SUBJECTIVE AND OBJECTIVE BOX
Chief Complaint: f/u DM2    History:  Patient seen and examined at bedside. Tolerating po, does not have nausea or vomiting. Does have some lower abdominal pain. States that when he coughs, he feels some fluid leaking from below. Has some pain in the legs as well.     MEDICATIONS  (STANDING):  atorvastatin 10 milliGRAM(s) Oral at bedtime  ceFAZolin   IVPB 2000 milliGRAM(s) IV Intermittent every 8 hours  cholecalciferol 2000 Unit(s) Oral daily  docusate sodium 100 milliGRAM(s) Oral three times a day  ferrous    sulfate 325 milliGRAM(s) Oral daily  finasteride 5 milliGRAM(s) Oral daily  furosemide    Tablet 20 milliGRAM(s) Oral every 24 hours  insulin glargine Injectable (LANTUS) 25 Unit(s) SubCutaneous at bedtime  insulin lispro (HumaLOG) corrective regimen sliding scale   SubCutaneous three times a day before meals  insulin lispro (HumaLOG) corrective regimen sliding scale   SubCutaneous at bedtime  insulin lispro Injectable (HumaLOG) 8 Unit(s) SubCutaneous three times a day before meals  lactated ringers. 1000 milliLiter(s) (125 mL/Hr) IV Continuous <Continuous>  lactulose Syrup 10 Gram(s) Oral every 6 hours  losartan 25 milliGRAM(s) Oral daily  pantoprazole    Tablet 40 milliGRAM(s) Oral before breakfast  pregabalin 100 milliGRAM(s) Oral two times a day  senna 2 Tablet(s) Oral at bedtime  tamsulosin 0.4 milliGRAM(s) Oral two times a day    MEDICATIONS  (PRN):  acetaminophen   Tablet .. 650 milliGRAM(s) Oral every 6 hours PRN Temp greater or equal to 38C (100.4F), Mild Pain (1 - 3)  cyclobenzaprine 5 milliGRAM(s) Oral three times a day PRN Muscle Spasm  diazepam    Tablet 5 milliGRAM(s) Oral every 12 hours PRN Anxiety  guaiFENesin    Syrup 100 milliGRAM(s) Oral every 6 hours PRN congestion  HYDROmorphone  Injectable 0.5 milliGRAM(s) SubCutaneous every 6 hours PRN breakthrough pain  magnesium hydroxide Suspension 30 milliLiter(s) Oral daily PRN Constipation  ondansetron Injectable 4 milliGRAM(s) IV Push every 6 hours PRN Nausea  oxyCODONE    IR 5 milliGRAM(s) Oral every 4 hours PRN Moderate Pain (4 - 6)  oxyCODONE    IR 10 milliGRAM(s) Oral every 4 hours PRN Severe Pain (7 - 10)  polyethylene glycol 3350 17 Gram(s) Oral daily PRN Constipation  traMADol 50 milliGRAM(s) Oral every 4 hours PRN Mild Pain (1 - 3)      Allergies  No Known Allergies    PHYSICAL EXAM:  VITALS: T(C): 37.7 (09-05-19 @ 09:12)  T(F): 99.8 (09-05-19 @ 09:12), Max: 99.9 (09-05-19 @ 00:06)  HR: 100 (09-05-19 @ 09:12) (97 - 106)  BP: 154/89 (09-05-19 @ 09:12) (111/65 - 175/94)  RR:  (18 - 18)  SpO2:  (92% - 97%)  Wt(kg): --  GENERAL: NAD, well-developed  RESPIRATORY: Clear to auscultation bilaterally; No rales, rhonchi, wheezing, or rubs  CARDIOVASCULAR: Regular rate and rhythm; No murmurs  GI: Soft, nontender, non distended  PSYCH: Alert and oriented x 3, reactive affect    POCT Blood Glucose.: 198 mg/dL (09-05-19 @ 11:54)  POCT Blood Glucose.: 153 mg/dL (09-05-19 @ 08:06) H 8, H 1  POCT Blood Glucose.: 182 mg/dL (09-04-19 @ 21:48) L 25  POCT Blood Glucose.: 185 mg/dL (09-04-19 @ 17:10) H 7, H 1  POCT Blood Glucose.: 162 mg/dL (09-04-19 @ 13:26) H 7, H 1  POCT Blood Glucose.: 146 mg/dL (09-04-19 @ 08:29) H 7  POCT Blood Glucose.: 272 mg/dL (09-03-19 @ 21:41)  POCT Blood Glucose.: 192 mg/dL (09-03-19 @ 17:51)  POCT Blood Glucose.: 159 mg/dL (09-02-19 @ 21:35)  POCT Blood Glucose.: 175 mg/dL (09-02-19 @ 16:31)  POCT Blood Glucose.: 148 mg/dL (09-02-19 @ 12:55)      09-05    131<L>  |  98  |  32<H>  ----------------------------<  151<H>  4.1   |  20<L>  |  1.66<H>    EGFR if : 49<L>  EGFR if non : 42<L>    Ca    8.3<L>      09-05          Hemoglobin A1C, Whole Blood: 9.4 % <H> [4.0 - 5.6] (08-14-19 @ 00:28)

## 2019-09-05 NOTE — PROGRESS NOTE ADULT - PROBLEM SELECTOR PLAN 1
- Test BG qac and qhs   - C/w Lantus dose 25 units qhs  - C/w Humalog 8 units before meals; if post-prandial BG values persistently above 180 mg/dL, may need to increase dose to 9 units  -Continue low dose Humalog correction scales qac and qhs

## 2019-09-05 NOTE — OCCUPATIONAL THERAPY INITIAL EVALUATION ADULT - MANUAL MUSCLE TESTING RESULTS, REHAB EVAL
L shoulder 3-/5, L wrist, elbow, digits 3/5, BLE 3/5, RUE 3/5/grossly assessed due to
grossly assessed due to/BUE 3/5, BLE 3/5

## 2019-09-05 NOTE — PHYSICAL THERAPY INITIAL EVALUATION ADULT - PERTINENT HX OF CURRENT PROBLEM, REHAB EVAL
67y/o M  from [facility], [facility]  spinal stenosis s/p T10-L4 laminectomy and fusion 5/2016, chronic L2 fracture, loosening screws. Pt. still having low back pain that radiates to bilateral lower extremities R>L, has been treated with PT, NSAID's, APRYL without much relief - pt. presents to [email or web address] today for scheduled T10-Pelvis Posterior Revision Laminectomy and Spinal Fusion on 8/27/19. Pt. with SAINI - which is unchanged for many years.
69y/o M  from [facility], [facility]  spinal stenosis s/p T10-L4 laminectomy and fusion 5/2016, chronic L2 fracture, loosening screws. Pt. still having low back pain that radiates to bilateral lower extremities R>L, has been treated with PT, NSAID's, APRYL without much relief - pt. presents to [facility] today for scheduled T10-Pelvis Posterior Revision Laminectomy and Spinal Fusion on 8/27/19. Pt. with SAINI - which is unchanged for many years.

## 2019-09-05 NOTE — PROGRESS NOTE ADULT - ASSESSMENT
Jorge is a very pleasant 68 Year-Old Gentleman history of Spinal stenosis of lumbosacral region, now s/p ALIF of L4-5, L5-S1 with Vascular Spine Exposure.     - Hemoglobin and hematocrit stable.  - Will sign off, please do not hesitate to contact for any Vascular questions or concerns.   - Excellent care per Orthopedics appreciated.     Vascular Surgery Pager #[phone]

## 2019-09-05 NOTE — OCCUPATIONAL THERAPY INITIAL EVALUATION ADULT - PERTINENT HX OF CURRENT PROBLEM, REHAB EVAL
69 yo Greenlandic speaking male from [facility], PMH CAD s/p CABG X 4 2015, CHF - no recent events, DM2, BPH s/p TURP 2017, hospitalized in 3/2019 with UTI 2/2 urinary retention - following up with urology, spinal stenosis s/p T10-L4 laminectomy and fusion 5/2016, chronic L2 fracture, loosening screws. Pt. still having low back pain that radiates to bilateral lower extremities R>L, has been treated with PT.
67 yo Martiniquais speaking male from [facility], PMH CAD s/p CABG X 4 2015, CHF - no recent events, DM2, BPH s/p TURP 2017, hospitalized in 3/2019 with UTI 2/2 urinary retention - following up with urology, spinal stenosis s/p T10-L4 laminectomy and fusion 5/2016, chronic L2 fracture, loosening screws. Pt. still having low back pain that radiates to bilateral lower extremities R>L, has been treated with PT.

## 2019-09-05 NOTE — DISCHARGE NOTE PROVIDER - NSDCFUADDINST_GEN_ALL_CORE_FT
Keep dressings clean.  Dressing may be removed within 2 weeks.  Then dressings as needed,    Physical therapy for ambulation WBAT with brace Keep dressings clean.  Dressing may be removed within 2 weeks )Aquacel).  Then dressings as needed,    Physical therapy for ambulation WBAT with brace    May begin Baby  ASA (81mg) 10days after 2nd surgery

## 2019-09-05 NOTE — PHYSICAL THERAPY INITIAL EVALUATION ADULT - ACTIVE RANGE OF MOTION EXAMINATION, REHAB EVAL
bilateral upper extremity Active ROM was WFL (within functional limits)/bilateral  lower extremity Active ROM was WFL (within functional limits)
Right UE Active ROM was WFL (within functional limits)/L shoulder flexion to 90 degrees actively 150 degrees passively/bilateral  lower extremity Active ROM was WFL (within functional limits)

## 2019-09-05 NOTE — PROGRESS NOTE ADULT - ASSESSMENT
69 y/o M w/h/o uncontrolled  T2DM>on insulin basal/bolus PTA. DM c/b CAD> s/p CABG and  CKD3. H/o spinal stenosis s/p T10-L4 Laminectomy in 2016  now admitted for revision of laminectomy and spinal fusion on 8/27 and now requiring anterior lumbar interbody fusion 9/3/19. Pt tolerating PO, BG now improving. Will continue with basal bolus insulin. BG goal 100-180 mg/dL.

## 2019-09-05 NOTE — PHYSICAL THERAPY INITIAL EVALUATION ADULT - IMPAIRMENTS FOUND, PT EVAL
gait, locomotion, and balance/aerobic capacity/endurance
gait, locomotion, and balance/aerobic capacity/endurance/muscle strength

## 2019-09-05 NOTE — OCCUPATIONAL THERAPY INITIAL EVALUATION ADULT - IMPAIRED TRANSFERS: BED/CHAIR, REHAB EVAL
decreased ROM/impaired balance/impaired motor control/impaired postural control/decreased strength
decreased ROM/decreased strength/pain/impaired postural control/impaired balance/impaired motor control

## 2019-09-05 NOTE — OCCUPATIONAL THERAPY INITIAL EVALUATION ADULT - PHYSICAL ASSIST/NONPHYSICAL ASSIST:DRESS LOWER BODY, OT EVAL
verbal cues/nonverbal cues (demo/gestures)/1 person assist
1 person assist/verbal cues/nonverbal cues (demo/gestures)

## 2019-09-05 NOTE — PHYSICAL THERAPY INITIAL EVALUATION ADULT - BED MOBILITY TRAINING, PT EVAL
GOAL: Pt will perform bed mobility independently in 4 weeks
GOAL: Pt will be independent with bed mobility in 2wks.

## 2019-09-05 NOTE — PROGRESS NOTE ADULT - SUBJECTIVE AND OBJECTIVE BOX
Vascular Surgery Progress Note     Subjective/24hour Events:   Patient seen and examined.   No acute events overnight.   Hemoglobin and hematocrit overall stable.     Vital Signs:  Vital Signs Last 24 Hrs  T(C): 37.7 (05 Sep 2019 09:12), Max: 37.7 (05 Sep 2019 00:06)  T(F): 99.8 (05 Sep 2019 09:12), Max: 99.9 (05 Sep 2019 00:06)  HR: 100 (05 Sep 2019 09:12) (97 - 106)  BP: 154/89 (05 Sep 2019 09:12) (68/29 - 175/94)  BP(mean): --  RR: 18 (05 Sep 2019 09:12) (18 - 18)  SpO2: 97% (05 Sep 2019 09:12) (92% - 97%)    CAPILLARY BLOOD GLUCOSE      POCT Blood Glucose.: 153 mg/dL (05 Sep 2019 08:06)  POCT Blood Glucose.: 182 mg/dL (04 Sep 2019 21:48)  POCT Blood Glucose.: 185 mg/dL (04 Sep 2019 17:10)  POCT Blood Glucose.: 162 mg/dL (04 Sep 2019 13:26)      I&O's Detail    04 Sep 2019 07:01  -  05 Sep 2019 07:00  --------------------------------------------------------  IN:    IV PiggyBack: 200 mL    lactated ringers.: 500 mL    Oral Fluid: 1060 mL  Total IN: 1760 mL    OUT:    Bulb: 25 mL    Indwelling Catheter - Urethral: 4300 mL  Total OUT: 4325 mL    Total NET: -2565 mL          MEDICATIONS  (STANDING):  atorvastatin 10 milliGRAM(s) Oral at bedtime  ceFAZolin   IVPB 2000 milliGRAM(s) IV Intermittent every 8 hours  cholecalciferol 2000 Unit(s) Oral daily  docusate sodium 100 milliGRAM(s) Oral three times a day  ferrous    sulfate 325 milliGRAM(s) Oral daily  finasteride 5 milliGRAM(s) Oral daily  furosemide    Tablet 20 milliGRAM(s) Oral every 24 hours  insulin glargine Injectable (LANTUS) 25 Unit(s) SubCutaneous at bedtime  insulin lispro (HumaLOG) corrective regimen sliding scale   SubCutaneous three times a day before meals  insulin lispro (HumaLOG) corrective regimen sliding scale   SubCutaneous at bedtime  insulin lispro Injectable (HumaLOG) 8 Unit(s) SubCutaneous three times a day before meals  lactated ringers. 1000 milliLiter(s) (125 mL/Hr) IV Continuous <Continuous>  lactulose Syrup 10 Gram(s) Oral every 6 hours  losartan 25 milliGRAM(s) Oral daily  pantoprazole    Tablet 40 milliGRAM(s) Oral before breakfast  pregabalin 100 milliGRAM(s) Oral two times a day  senna 2 Tablet(s) Oral at bedtime  tamsulosin 0.4 milliGRAM(s) Oral two times a day    MEDICATIONS  (PRN):  acetaminophen   Tablet .. 650 milliGRAM(s) Oral every 6 hours PRN Temp greater or equal to 38C (100.4F), Mild Pain (1 - 3)  cyclobenzaprine 5 milliGRAM(s) Oral three times a day PRN Muscle Spasm  diazepam    Tablet 5 milliGRAM(s) Oral every 12 hours PRN Anxiety  guaiFENesin    Syrup 100 milliGRAM(s) Oral every 6 hours PRN congestion  HYDROmorphone  Injectable 0.5 milliGRAM(s) SubCutaneous every 6 hours PRN breakthrough pain  magnesium hydroxide Suspension 30 milliLiter(s) Oral daily PRN Constipation  ondansetron Injectable 4 milliGRAM(s) IV Push every 6 hours PRN Nausea  oxyCODONE    IR 5 milliGRAM(s) Oral every 4 hours PRN Moderate Pain (4 - 6)  oxyCODONE    IR 10 milliGRAM(s) Oral every 4 hours PRN Severe Pain (7 - 10)  polyethylene glycol 3350 17 Gram(s) Oral daily PRN Constipation  traMADol 50 milliGRAM(s) Oral every 4 hours PRN Mild Pain (1 - 3)      Physical Exam:  Constitutional: NAD, A&O x 3  Respiratory: non labored breathing   Gastrointestinal: abd soft, ND, mild tenderness around incision at left lower quadrant abd. incision with dressing in place, dressing clean, intact, no strikethrough bleeding, ELBERT with 25cc SS.   Vascular: + palpable femoral pulses b/l, palp DP/PT b/l      Labs:    09-05    131<L>  |  98  |  32<H>  ----------------------------<  151<H>  4.1   |  20<L>  |  1.66<H>    Ca    8.3<L>      05 Sep 2019 07:03                              9.3    17.34 )-----------( 259      ( 05 Sep 2019 09:39 )             28.2

## 2019-09-05 NOTE — PHYSICAL THERAPY INITIAL EVALUATION ADULT - TRANSFER TRAINING, PT EVAL
GOAL: Pt will perform all transfers I with appropriate assistive device in 4 weeks
GOAL: Pt will perform sit<>stand transfer independently with least restrictive device in 2wks.

## 2019-09-05 NOTE — PHYSICAL THERAPY INITIAL EVALUATION ADULT - PLANNED THERAPY INTERVENTIONS, PT EVAL
balance training/gait training/strengthening/bed mobility training/transfer training
balance training/bed mobility training/strengthening/gait training/transfer training

## 2019-09-05 NOTE — PHYSICAL THERAPY INITIAL EVALUATION ADULT - STRENGTHENING, PT EVAL
GOAL: Pt will improved B UE/LE strength to for increased limb stability, to improve gait, and facilitate stair negotiation in 4 weeks
GOAL: Pt will improve BUE/BLE strength by 1/2 grade in 2wks to improve overall functional mobility

## 2019-09-05 NOTE — OCCUPATIONAL THERAPY INITIAL EVALUATION ADULT - MUSCLE TONE ASSESSMENT, REHAB EVAL
right-side extremities/normal/left-side extremities
left-side extremities/right-side extremities/normal

## 2019-09-06 LAB
ANION GAP SERPL CALC-SCNC: 14 MMOL/L — SIGNIFICANT CHANGE UP (ref 5–17)
BASOPHILS # BLD AUTO: 0.03 K/UL — SIGNIFICANT CHANGE UP (ref 0–0.2)
BASOPHILS NFR BLD AUTO: 0.2 % — SIGNIFICANT CHANGE UP (ref 0–2)
BUN SERPL-MCNC: 34 MG/DL — HIGH (ref 7–23)
CALCIUM SERPL-MCNC: 8.8 MG/DL — SIGNIFICANT CHANGE UP (ref 8.4–10.5)
CHLORIDE SERPL-SCNC: 99 MMOL/L — SIGNIFICANT CHANGE UP (ref 96–108)
CO2 SERPL-SCNC: 21 MMOL/L — LOW (ref 22–31)
CREAT SERPL-MCNC: 1.58 MG/DL — HIGH (ref 0.5–1.3)
EOSINOPHIL # BLD AUTO: 0.04 K/UL — SIGNIFICANT CHANGE UP (ref 0–0.5)
EOSINOPHIL NFR BLD AUTO: 0.2 % — SIGNIFICANT CHANGE UP (ref 0–6)
GLUCOSE BLDC GLUCOMTR-MCNC: 151 MG/DL — HIGH (ref 70–99)
GLUCOSE BLDC GLUCOMTR-MCNC: 182 MG/DL — HIGH (ref 70–99)
GLUCOSE BLDC GLUCOMTR-MCNC: 204 MG/DL — HIGH (ref 70–99)
GLUCOSE BLDC GLUCOMTR-MCNC: 215 MG/DL — HIGH (ref 70–99)
GLUCOSE SERPL-MCNC: 179 MG/DL — HIGH (ref 70–99)
HCT VFR BLD CALC: 27.4 % — LOW (ref 39–50)
HGB BLD-MCNC: 9.2 G/DL — LOW (ref 13–17)
IMM GRANULOCYTES NFR BLD AUTO: 0.9 % — SIGNIFICANT CHANGE UP (ref 0–1.5)
LYMPHOCYTES # BLD AUTO: 0.55 K/UL — LOW (ref 1–3.3)
LYMPHOCYTES # BLD AUTO: 3.1 % — LOW (ref 13–44)
MCHC RBC-ENTMCNC: 29.2 PG — SIGNIFICANT CHANGE UP (ref 27–34)
MCHC RBC-ENTMCNC: 33.6 GM/DL — SIGNIFICANT CHANGE UP (ref 32–36)
MCV RBC AUTO: 87 FL — SIGNIFICANT CHANGE UP (ref 80–100)
MONOCYTES # BLD AUTO: 1.13 K/UL — HIGH (ref 0–0.9)
MONOCYTES NFR BLD AUTO: 6.3 % — SIGNIFICANT CHANGE UP (ref 2–14)
NEUTROPHILS # BLD AUTO: 16.05 K/UL — HIGH (ref 1.8–7.4)
NEUTROPHILS NFR BLD AUTO: 89.3 % — HIGH (ref 43–77)
PLATELET # BLD AUTO: 328 K/UL — SIGNIFICANT CHANGE UP (ref 150–400)
POTASSIUM SERPL-MCNC: 3.8 MMOL/L — SIGNIFICANT CHANGE UP (ref 3.5–5.3)
POTASSIUM SERPL-SCNC: 3.8 MMOL/L — SIGNIFICANT CHANGE UP (ref 3.5–5.3)
RBC # BLD: 3.15 M/UL — LOW (ref 4.2–5.8)
RBC # FLD: 13.7 % — SIGNIFICANT CHANGE UP (ref 10.3–14.5)
SODIUM SERPL-SCNC: 134 MMOL/L — LOW (ref 135–145)
WBC # BLD: 17.97 K/UL — HIGH (ref 3.8–10.5)
WBC # FLD AUTO: 17.97 K/UL — HIGH (ref 3.8–10.5)

## 2019-09-06 PROCEDURE — 99222 1ST HOSP IP/OBS MODERATE 55: CPT

## 2019-09-06 PROCEDURE — 99232 SBSQ HOSP IP/OBS MODERATE 35: CPT

## 2019-09-06 RX ORDER — INSULIN LISPRO 100/ML
9 VIAL (ML) SUBCUTANEOUS
Refills: 0 | Status: DISCONTINUED | OUTPATIENT
Start: 2019-09-06 | End: 2019-09-08

## 2019-09-06 RX ORDER — GLYCERIN ADULT
1 SUPPOSITORY, RECTAL RECTAL ONCE
Refills: 0 | Status: COMPLETED | OUTPATIENT
Start: 2019-09-06 | End: 2019-09-08

## 2019-09-06 RX ORDER — INSULIN GLARGINE 100 [IU]/ML
26 INJECTION, SOLUTION SUBCUTANEOUS AT BEDTIME
Refills: 0 | Status: DISCONTINUED | OUTPATIENT
Start: 2019-09-06 | End: 2019-09-11

## 2019-09-06 RX ORDER — SIMETHICONE 80 MG/1
80 TABLET, CHEWABLE ORAL
Refills: 0 | Status: DISCONTINUED | OUTPATIENT
Start: 2019-09-06 | End: 2019-09-11

## 2019-09-06 RX ADMIN — Medication 20 MILLIGRAM(S): at 06:09

## 2019-09-06 RX ADMIN — LACTULOSE 10 GRAM(S): 10 SOLUTION ORAL at 17:25

## 2019-09-06 RX ADMIN — SENNA PLUS 2 TABLET(S): 8.6 TABLET ORAL at 22:22

## 2019-09-06 RX ADMIN — Medication 100 MILLIGRAM(S): at 06:09

## 2019-09-06 RX ADMIN — Medication 325 MILLIGRAM(S): at 12:25

## 2019-09-06 RX ADMIN — PANTOPRAZOLE SODIUM 40 MILLIGRAM(S): 20 TABLET, DELAYED RELEASE ORAL at 06:09

## 2019-09-06 RX ADMIN — Medication 1: at 08:11

## 2019-09-06 RX ADMIN — Medication 2: at 17:26

## 2019-09-06 RX ADMIN — OXYCODONE HYDROCHLORIDE 10 MILLIGRAM(S): 5 TABLET ORAL at 13:54

## 2019-09-06 RX ADMIN — Medication 100 MILLIGRAM(S): at 22:22

## 2019-09-06 RX ADMIN — Medication 8 UNIT(S): at 12:24

## 2019-09-06 RX ADMIN — Medication 100 MILLIGRAM(S): at 15:24

## 2019-09-06 RX ADMIN — Medication 100 MILLIGRAM(S): at 15:26

## 2019-09-06 RX ADMIN — ATORVASTATIN CALCIUM 10 MILLIGRAM(S): 80 TABLET, FILM COATED ORAL at 22:22

## 2019-09-06 RX ADMIN — Medication 9 UNIT(S): at 17:26

## 2019-09-06 RX ADMIN — SIMETHICONE 80 MILLIGRAM(S): 80 TABLET, CHEWABLE ORAL at 20:36

## 2019-09-06 RX ADMIN — Medication 8 UNIT(S): at 08:11

## 2019-09-06 RX ADMIN — Medication 2: at 12:24

## 2019-09-06 RX ADMIN — Medication 100 MILLIGRAM(S): at 17:31

## 2019-09-06 RX ADMIN — TAMSULOSIN HYDROCHLORIDE 0.4 MILLIGRAM(S): 0.4 CAPSULE ORAL at 06:09

## 2019-09-06 RX ADMIN — OXYCODONE HYDROCHLORIDE 10 MILLIGRAM(S): 5 TABLET ORAL at 12:24

## 2019-09-06 RX ADMIN — OXYCODONE HYDROCHLORIDE 10 MILLIGRAM(S): 5 TABLET ORAL at 16:46

## 2019-09-06 RX ADMIN — Medication 100 MILLIGRAM(S): at 06:16

## 2019-09-06 RX ADMIN — Medication 2000 UNIT(S): at 12:25

## 2019-09-06 RX ADMIN — INSULIN GLARGINE 26 UNIT(S): 100 INJECTION, SOLUTION SUBCUTANEOUS at 22:22

## 2019-09-06 RX ADMIN — FINASTERIDE 5 MILLIGRAM(S): 5 TABLET, FILM COATED ORAL at 12:26

## 2019-09-06 RX ADMIN — MAGNESIUM HYDROXIDE 30 MILLILITER(S): 400 TABLET, CHEWABLE ORAL at 00:05

## 2019-09-06 RX ADMIN — TAMSULOSIN HYDROCHLORIDE 0.4 MILLIGRAM(S): 0.4 CAPSULE ORAL at 17:27

## 2019-09-06 RX ADMIN — LOSARTAN POTASSIUM 25 MILLIGRAM(S): 100 TABLET, FILM COATED ORAL at 06:09

## 2019-09-06 NOTE — PROGRESS NOTE ADULT - SUBJECTIVE AND OBJECTIVE BOX
Patient is a 68y old  Male who presents with a chief complaint of back pain (28 Aug 2019 14:15)      SUBJECTIVE / OVERNIGHT EVENTS:    Events noted.  CONSTITUTIONAL: Feels better  RESPIRATORY: No cough, wheezing,    CARDIOVASCULAR: No chest pain, palpitations,   GASTROINTESTINAL: No abdominal or epigastric pain.   NEUROLOGICAL: No headaches,     MEDICATIONS  (STANDING):  atorvastatin 10 milliGRAM(s) Oral at bedtime  ceFAZolin   IVPB 2000 milliGRAM(s) IV Intermittent every 8 hours  cholecalciferol 2000 Unit(s) Oral daily  docusate sodium 100 milliGRAM(s) Oral three times a day  ferrous    sulfate 325 milliGRAM(s) Oral daily  finasteride 5 milliGRAM(s) Oral daily  furosemide    Tablet 20 milliGRAM(s) Oral every 24 hours  insulin glargine Injectable (LANTUS) 27 Unit(s) SubCutaneous at bedtime  insulin lispro (HumaLOG) corrective regimen sliding scale   SubCutaneous three times a day before meals  insulin lispro (HumaLOG) corrective regimen sliding scale   SubCutaneous at bedtime  insulin lispro Injectable (HumaLOG) 9 Unit(s) SubCutaneous three times a day with meals  losartan 25 milliGRAM(s) Oral daily  pantoprazole    Tablet 40 milliGRAM(s) Oral before breakfast  pregabalin 100 milliGRAM(s) Oral two times a day  senna 2 Tablet(s) Oral at bedtime  sodium chloride 0.9%. 1000 milliLiter(s) (20 mL/Hr) IV Continuous <Continuous>  tamsulosin 0.4 milliGRAM(s) Oral two times a day    MEDICATIONS  (PRN):  cyclobenzaprine 5 milliGRAM(s) Oral three times a day PRN Muscle Spasm  guaiFENesin    Syrup 100 milliGRAM(s) Oral every 6 hours PRN congestion  magnesium hydroxide Suspension 30 milliLiter(s) Oral daily PRN Constipation  ondansetron Injectable 4 milliGRAM(s) IV Push every 6 hours PRN Nausea  oxyCODONE    IR 10 milliGRAM(s) Oral every 4 hours PRN Severe Pain (7 - 10)  oxyCODONE    IR 5 milliGRAM(s) Oral every 4 hours PRN Moderate Pain (4 - 6)  polyethylene glycol 3350 17 Gram(s) Oral daily PRN Constipation  traMADol 50 milliGRAM(s) Oral every 4 hours PRN Mild Pain (1 - 3)        CAPILLARY BLOOD GLUCOSE      POCT Blood Glucose.: 200 mg/dL (30 Aug 2019 17:25)  POCT Blood Glucose.: 205 mg/dL (30 Aug 2019 12:04)  POCT Blood Glucose.: 138 mg/dL (30 Aug 2019 08:22)  POCT Blood Glucose.: 201 mg/dL (29 Aug 2019 21:41)  POCT Blood Glucose.: 209 mg/dL (29 Aug 2019 18:41)    I&O's Summary    29 Aug 2019 07:01  -  30 Aug 2019 07:00  --------------------------------------------------------  IN: 990 mL / OUT: 3471 mL / NET: -2481 mL    30 Aug 2019 07:01  -  30 Aug 2019 18:16  --------------------------------------------------------  IN: 610 mL / OUT: 940 mL / NET: -330 mL        PHYSICAL EXAM:    NECK: Supple, No JVD  CHEST/LUNG: Clear to auscultation bilaterally; No wheezing.  HEART: Regular rate and rhythm; No murmurs, rubs, or gallops  ABDOMEN: Soft, Nontender, Nondistended; Bowel sounds present  EXTREMITIES:   No edema  NEUROLOGY: AAO       LABS:                        9.9    15.8  )-----------( 132      ( 29 Aug 2019 03:09 )             28.9     08-29    134<L>  |  102  |  27<H>  ----------------------------<  206<H>  4.6   |  21<L>  |  2.09<H>    Ca    8.8      29 Aug 2019 03:09  Phos  3.6     08-29  Mg     1.9     08-29      PT/INR - ( 28 Aug 2019 21:39 )   PT: 14.7 sec;   INR: 1.27 ratio         PTT - ( 28 Aug 2019 21:39 )  PTT:31.5 sec  CARDIAC MARKERS ( 29 Aug 2019 03:09 )  x     / x     / 2284 U/L / x     / 8.3 ng/mL  CARDIAC MARKERS ( 28 Aug 2019 21:39 )  x     / x     / 2565 U/L / x     / 9.8 ng/mL        CAPILLARY BLOOD GLUCOSE      POCT Blood Glucose.: 200 mg/dL (30 Aug 2019 17:25)  POCT Blood Glucose.: 205 mg/dL (30 Aug 2019 12:04)  POCT Blood Glucose.: 138 mg/dL (30 Aug 2019 08:22)  POCT Blood Glucose.: 201 mg/dL (29 Aug 2019 21:41)  POCT Blood Glucose.: 209 mg/dL (29 Aug 2019 18:41)                RADIOLOGY & ADDITIONAL TESTS:    Imaging Personally Reviewed:    Consultant(s) Notes Reviewed:      Care Discussed with Consultants/Other Providers:

## 2019-09-06 NOTE — PROGRESS NOTE ADULT - ASSESSMENT
67 y/o M w/h/o uncontrolled  T2DM>on insulin basal/bolus PTA. DM c/b CAD> s/p CABG and  CKD3. H/o spinal stenosis s/p T10-L4 Laminectomy in 2016  now admitted fro revision of laminectomy  and spinal fusion on 8/27 and now requiring anterior lumbar interbody fusion 9/3/19. Pt tolerating POs with prandial BG going up as PO improves. Will increase premeal Humalog to keep BG at goal of 100s to 180s. No hypoglycemia. Spoke to pt and daughter about the need to have portion control regarding carbs if eating food from home and hospital. They verbalized understanding. Creat improving but WBC up> on antibiotic 67 y/o M w/h/o uncontrolled  T2DM>on insulin basal/bolus PTA. DM c/b CAD> s/p CABG and  CKD3. H/o spinal stenosis s/p T10-L4 Laminectomy in 2016  now admitted fro revision of laminectomy  and spinal fusion on 8/27 and now requiring anterior lumbar interbody fusion 9/3/19. Pt tolerating POs with prandial BG in high 100s most of the time. Will increase basal/bolus by 1 unit to keep BG <180s. Creat improving but WBC still up> on antibiotic

## 2019-09-06 NOTE — PROGRESS NOTE ADULT - ASSESSMENT
68M T11-S1 Lami/Fusion POD 10 (8/27/19), ALIF 9/3 POD #3. Recovering appropriately on floor, Drains and dressing with appropriate output and appearance    - Continue dressing  - Care per primary, Orthopedic surgery    Plastic surgery  [phone] 68M T11-S1 Lami/Fusion POD 10 (8/27/19), ALIF 9/3 POD #3. Recovering appropriately on floor.    - Continue dressing  - Care per primary, Orthopedic surgery    Plastic surgery  [phone]

## 2019-09-06 NOTE — PROGRESS NOTE ADULT - ASSESSMENT
A/P: 68M s/p ALIF  Analgesia  DVT ppx  WBAT  PT/OT  Peters to remain in per Urology  Drains per plastics, monitor/record output  Antibiotics until drain removed  FU labs  DC planning

## 2019-09-06 NOTE — CONSULT NOTE ADULT - CONSULT REASON
Evaluate Rehabilitation Needs
Medical Eval
Q1h Neurochecks s/p T11-L4 revision laminectomy, L4-S1 posterior spinal fusion
DM

## 2019-09-06 NOTE — PROGRESS NOTE ADULT - SUBJECTIVE AND OBJECTIVE BOX
Pt S/E at bedside, no acute events overnight, pain controlled    Vital Signs Last 24 Hrs  T(C): 36.8 (06 Sep 2019 05:21), Max: 37.7 (05 Sep 2019 09:12)  T(F): 98.2 (06 Sep 2019 05:21), Max: 99.8 (05 Sep 2019 09:12)  HR: 91 (06 Sep 2019 05:21) (86 - 100)  BP: 149/81 (06 Sep 2019 05:21) (120/81 - 154/89)  BP(mean): --  RR: 18 (06 Sep 2019 05:21) (18 - 18)  SpO2: 96% (06 Sep 2019 05:21) (92% - 98%)    Gen: NAD    Spine:  Dressing clean dry intact  +EHL/FHL/TA/GS  +AIN/PIN/M/R/U/Msc/Ax  SILT L3-S1  SILT C5-T1  +DP/PT Pulses  +Radial Pulse  Compartments soft  No calf TTP B/L

## 2019-09-06 NOTE — PROGRESS NOTE ADULT - ASSESSMENT
A/P: 68M s/p T11-S1 Lami/Fusion.    S/p T11-S1 Lami/Fusion:S/p ALIF     Ortho spine f/up noted.  Pain control through Dilaudid PCA      DM II:  Lantus/Humalog/FSSS  Endo f/up noted.    Lt shoulder pain:  S/p steroid injection      HTN:  Cw lasix/Losartan    CKD IV:  BMP/Cr stable    Dw family

## 2019-09-06 NOTE — PROGRESS NOTE ADULT - SUBJECTIVE AND OBJECTIVE BOX
DIABETES FOLLOW UP NOTE: Saw pt earlier today  INTERVAL HX: 67 y/o M w/h/o uncontrolled  T2DM>on insulin basal/bolus PTA. DM c/b CAD> s/p CABG and  CKD3. H/o spinal stenosis s/p T10-L4 Laminectomy in 2016  now admitted fro revision of laminectomy and spinal fusion on 8/27 and requiring anterior lumbar interbody fusion 9/3/19. Pt tolerating POs with FBG at goal as he is starting to eat again. C/o surgical pain. Daughter at bedside assiting with lunch. Eating food from home and hospital. No hypoglycemia    Review of Systems:  General: Positive surgical pain.    Cardiovascular: No chest pain, palpitations  Respiratory: No SOB, no cough  GI: No nausea, vomiting, abdominal pain  Endocrine: no polyuria, polydipsia or S&Sx of hypoglycemia    Allergies    No Known Allergies    Intolerances      MEDICATIONS:  atorvastatin 10 milliGRAM(s) Oral at bedtime  cholecalciferol 2000 Unit(s) Oral daily  finasteride 5 milliGRAM(s) Oral daily  ceFAZolin   IVPB 2000 milliGRAM(s) IV Intermittent every 8 hours  insulin glargine Injectable (LANTUS) 25 Unit(s) SubCutaneous at bedtime  insulin lispro (HumaLOG) corrective regimen sliding scale   SubCutaneous three times a day before meals  insulin lispro (HumaLOG) corrective regimen sliding scale   SubCutaneous at bedtime  insulin lispro Injectable (HumaLOG) 7 Unit(s) SubCutaneous three times a day before meals    PHYSICAL EXAM:  Vital Signs Last 24 Hrs  T(C): 36.9 (09-06-19 @ 14:27), Max: 37.4 (09-05-19 @ 15:49)  T(F): 98.5 (09-06-19 @ 14:27), Max: 99.3 (09-05-19 @ 15:49)  HR: 97 (09-06-19 @ 14:27) (86 - 100)  BP: 160/90 (09-06-19 @ 14:27) (120/81 - 160/90)  BP(mean): --  RR: 18 (09-06-19 @ 14:27) (18 - 18)  SpO2: 97% (09-06-19 @ 14:27) (92% - 98%)  GENERAL: Male laying in bed in NAD. Daughter at bedside  Abdomen: Soft, nontender, non distended. Obese, Abdominal dressing D/I  Extremities: Warm, no edema in all 4 exts. Venodyne boots on  NEURO: A&Ox3    LABS:  POCT Blood Glucose.: 204 mg/dL (09-06-19 @ 12:23)  POCT Blood Glucose.: 182 mg/dL (09-06-19 @ 07:58)  POCT Blood Glucose.: 159 mg/dL (09-05-19 @ 21:35)  POCT Blood Glucose.: 181 mg/dL (09-05-19 @ 17:22)  POCT Blood Glucose.: 198 mg/dL (09-05-19 @ 11:54)  POCT Blood Glucose.: 153 mg/dL (09-05-19 @ 08:06)  POCT Blood Glucose.: 182 mg/dL (09-04-19 @ 21:48)  POCT Blood Glucose.: 185 mg/dL (09-04-19 @ 17:10)                           9.2    17.97 )-----------( 328      ( 06 Sep 2019 10:30 )             27.4     09-06    134<L>  |  99  |  34<H>  ----------------------------<  179<H>  3.8   |  21<L>  |  1.58<H>    Ca    8.8      06 Sep 2019 07:01    Hemoglobin A1C, Whole Blood: 9.4 % <H> [4.0 - 5.6] (08-14-19 @ 00:28)      08-29 Chol 75 LDL 17 HDL 29<L> Trig 143 DIABETES FOLLOW UP NOTE: Saw pt earlier today  INTERVAL HX: 67 y/o M w/h/o uncontrolled  T2DM>on insulin basal/bolus PTA. DM c/b CAD> s/p CABG and  CKD3. H/o spinal stenosis s/p T10-L4 Laminectomy in 2016  now admitted fro revision of laminectomy and spinal fusion on 8/27 and requiring anterior lumbar interbody fusion 9/3/19. Pt tolerating POs with glycemic control fair noting BG in high 100s most of the time. No hypoglycemia.     Review of Systems:  General: Positive surgical pain.    Cardiovascular: No chest pain, palpitations  Respiratory: No SOB, no cough  GI: No nausea, vomiting, abdominal pain  Endocrine: no polyuria, polydipsia or S&Sx of hypoglycemia    Allergies    No Known Allergies    Intolerances      MEDICATIONS:  atorvastatin 10 milliGRAM(s) Oral at bedtime  cholecalciferol 2000 Unit(s) Oral daily  finasteride 5 milliGRAM(s) Oral daily  ceFAZolin   IVPB 2000 milliGRAM(s) IV Intermittent every 8 hours  insulin glargine Injectable (LANTUS) 25 Unit(s) SubCutaneous at bedtime  insulin lispro (HumaLOG) corrective regimen sliding scale   SubCutaneous three times a day before meals  insulin lispro (HumaLOG) corrective regimen sliding scale   SubCutaneous at bedtime  insulin lispro Injectable (HumaLOG)8 Unit(s) SubCutaneous three times a day before meals  lactulose Syrup 10 Gram(s) Oral every 6 hours    PHYSICAL EXAM:  Vital Signs Last 24 Hrs  T(C): 36.9 (09-06-19 @ 14:27), Max: 37.4 (09-05-19 @ 15:49)  T(F): 98.5 (09-06-19 @ 14:27), Max: 99.3 (09-05-19 @ 15:49)  HR: 97 (09-06-19 @ 14:27) (86 - 100)  BP: 160/90 (09-06-19 @ 14:27) (120/81 - 160/90)  BP(mean): --  RR: 18 (09-06-19 @ 14:27) (18 - 18)  SpO2: 97% (09-06-19 @ 14:27) (92% - 98%)  GENERAL: Male sitting in chair in NAD.   Chest: Chest brace on.  Abdomen: Soft, nontender, non distended. Obese, Abdominal dressing D/I  Extremities: Warm, no edema in all 4 exts.   NEURO: A&Ox3    LABS:  POCT Blood Glucose.: 204 mg/dL (09-06-19 @ 12:23)  POCT Blood Glucose.: 182 mg/dL (09-06-19 @ 07:58)  POCT Blood Glucose.: 159 mg/dL (09-05-19 @ 21:35)  POCT Blood Glucose.: 181 mg/dL (09-05-19 @ 17:22)  POCT Blood Glucose.: 198 mg/dL (09-05-19 @ 11:54)  POCT Blood Glucose.: 153 mg/dL (09-05-19 @ 08:06)  POCT Blood Glucose.: 182 mg/dL (09-04-19 @ 21:48)  POCT Blood Glucose.: 185 mg/dL (09-04-19 @ 17:10)                                   9.2    17.97 )-----------( 328      ( 06 Sep 2019 10:30 )             27.4     09-06    134<L>  |  99  |  34<H>  ----------------------------<  179<H>  3.8   |  21<L>  |  1.58<H>    Ca    8.8      06 Sep 2019 07:01    Hemoglobin A1C, Whole Blood: 9.4 % <H> [4.0 - 5.6] (08-14-19 @ 00:28)      08-29 Chol 75 LDL 17 HDL 29<L> Trig 143

## 2019-09-06 NOTE — CONSULT NOTE ADULT - SUBJECTIVE AND OBJECTIVE BOX
Patient is a 68y old  Male who presents with a chief complaint of laminectomy and fusion (05 Sep 2019 12:29)    Admission HPI:  69 yo [place]itanian speaking male from [facility], PMH CAD s/p CABG X 4 2015, CHF - no recent events, DM2, BPH s/p TURP 2017, hospitalized in 3/2019 with UTI 2/2 urinary retention - following up with urology, spinal stenosis s/p T10-L4 laminectomy and fusion 5/2016, chronic L2 fracture, loosening screws. Pt. still having low back pain that radiates to bilateral lower extremities R>L, has been treated with PT, NSAID's, APRYL without much relief - pt. presents to PST today for scheduled T10-Pelvis Posterior Revision Laminectomy and Spinal Fusion on 8/27/19. Pt. with SAINI - which is unchanged for many years, denies recent fever, chills, chest pain, SOB, changes in bowel/urinary habits.  Obtained last EKG and Echo from cardiologist office - in chart (EF 50-54%)  Pt. will continue on aspirin during reynaldo-op period, pt. will see PCP pre-op (13 Aug 2019 16:11)    Interval History:  Patient underwent L4-5, L5-S1 ALIF on 9/3.  Post-op had shoulder pain- responded well to injection on 9/4.  Blood sugars followed by endocrine.    REVIEW OF SYSTEMS: No chest pain, shortness of breath, nausea, vomiting or diarhea; other ROS neg     PAST MEDICAL & SURGICAL HISTORY  ETOH abuse  Chronic low back pain  Lumbar compression fracture  Acute UTI  Acute CHF  LBBB (left bundle branch block)  Lumbar disc disease  Transient cerebral ischemia, unspecified type  Coronary artery disease involving native coronary artery of native heart without angina pectoris  Dyslipidemia  Benign prostatic hypertrophy  Hypertension  Type 2 diabetes mellitus  S/P TURP  S/P lumbar laminectomy  Status post coronary artery bypass graft    FUNCTIONAL HISTORY:   Lives w wife in home w NICHOLAS.  PTA Independent    CURRENT FUNCTIONAL STATUS:  Mod A transfers, min A gait.     FAMILY HISTORY   N/C    RECENT LABS/IMAGING  CBC Full  -  ( 06 Sep 2019 10:30 )  WBC Count : 17.97 K/uL  RBC Count : 3.15 M/uL  Hemoglobin : 9.2 g/dL  Hematocrit : 27.4 %  Platelet Count - Automated : 328 K/uL  Mean Cell Volume : 87.0 fl  Mean Cell Hemoglobin : 29.2 pg  Mean Cell Hemoglobin Concentration : 33.6 gm/dL  Auto Neutrophil # : 16.05 K/uL  Auto Lymphocyte # : 0.55 K/uL  Auto Monocyte # : 1.13 K/uL  Auto Eosinophil # : 0.04 K/uL  Auto Basophil # : 0.03 K/uL  Auto Neutrophil % : 89.3 %  Auto Lymphocyte % : 3.1 %  Auto Monocyte % : 6.3 %  Auto Eosinophil % : 0.2 %  Auto Basophil % : 0.2 %    09-06    134<L>  |  99  |  34<H>  ----------------------------<  179<H>  3.8   |  21<L>  |  1.58<H>    Ca    8.8      06 Sep 2019 07:01    VITALS  T(C): 37.1 (09-06-19 @ 08:35), Max: 37.4 (09-05-19 @ 15:49)  HR: 87 (09-06-19 @ 08:35) (86 - 100)  BP: 131/86 (09-06-19 @ 08:35) (120/81 - 154/86)  RR: 18 (09-06-19 @ 08:35) (18 - 18)  SpO2: 95% (09-06-19 @ 08:35) (92% - 98%)  Wt(kg): --    ALLERGIES  No Known Allergies    MEDICATIONS   acetaminophen   Tablet .. 650 milliGRAM(s) Oral every 6 hours PRN  atorvastatin 10 milliGRAM(s) Oral at bedtime  ceFAZolin   IVPB 2000 milliGRAM(s) IV Intermittent every 8 hours  cholecalciferol 2000 Unit(s) Oral daily  cyclobenzaprine 5 milliGRAM(s) Oral three times a day PRN  diazepam    Tablet 5 milliGRAM(s) Oral every 12 hours PRN  docusate sodium 100 milliGRAM(s) Oral three times a day  ferrous    sulfate 325 milliGRAM(s) Oral daily  finasteride 5 milliGRAM(s) Oral daily  furosemide    Tablet 20 milliGRAM(s) Oral every 24 hours  glycerin Suppository - Adult 1 Suppository(s) Rectal once  guaiFENesin    Syrup 100 milliGRAM(s) Oral every 6 hours PRN  HYDROmorphone  Injectable 0.5 milliGRAM(s) SubCutaneous every 6 hours PRN  insulin glargine Injectable (LANTUS) 25 Unit(s) SubCutaneous at bedtime  insulin lispro (HumaLOG) corrective regimen sliding scale   SubCutaneous three times a day before meals  insulin lispro (HumaLOG) corrective regimen sliding scale   SubCutaneous at bedtime  insulin lispro Injectable (HumaLOG) 8 Unit(s) SubCutaneous three times a day before meals  lactated ringers. 1000 milliLiter(s) IV Continuous <Continuous>  lactulose Syrup 10 Gram(s) Oral every 6 hours  losartan 25 milliGRAM(s) Oral daily  magnesium hydroxide Suspension 30 milliLiter(s) Oral daily PRN  ondansetron Injectable 4 milliGRAM(s) IV Push every 6 hours PRN  oxyCODONE    IR 5 milliGRAM(s) Oral every 4 hours PRN  oxyCODONE    IR 10 milliGRAM(s) Oral every 4 hours PRN  pantoprazole    Tablet 40 milliGRAM(s) Oral before breakfast  polyethylene glycol 3350 17 Gram(s) Oral daily PRN  pregabalin 100 milliGRAM(s) Oral two times a day  senna 2 Tablet(s) Oral at bedtime  tamsulosin 0.4 milliGRAM(s) Oral two times a day  traMADol 50 milliGRAM(s) Oral every 4 hours PRN      ----------------------------------------------------------------------------------------  PHYSICAL EXAM  Constitutional - NAD, Comfortable  HEENT - NCAT, EOMI  Neck - Supple, No limited ROM  Chest - CTA bilaterally, No wheeze, No rhonchi, No crackles  Cardiovascular - RRR, S1S2, No murmurs  Abdomen - BS+, Soft, NTND  Extremities - No C/C/E, No calf tenderness   Neurologic Exam -                    Cognitive - Awake, Alert, AAO to self, place, date, year, situation     Communication - Fluent, No dysarthria, no aphasia     Cranial Nerves - CN 2-12 intact     Motor - No focal deficits      Sensory - Intact to LT     Reflexes - DTR Intact, No primitive reflexive       Psychiatric - Mood stable, Affect WNL      Impression:  69 yo with functional deficits secondary to diagnosis of lumbar stenosis.     Plan:  PT- ROM, Bed Mob, Transfers, Amb w AD   OT- ADLs  Prec- Falls, Cardiac, Pulm  DVT Prophylaxis- ASA  Skin- Turn q2 h  Dispo- Patient is a 68y old  Male who presents with a chief complaint of laminectomy and fusion (05 Sep 2019 12:29)    Admission HPI:  67 yo Botswanan speaking male from [facility], PMH CAD s/p CABG X 4 2015, CHF - no recent events, DM2, BPH s/p TURP 2017, hospitalized in 3/2019 with UTI 2/2 urinary retention - following up with urology, spinal stenosis s/p T10-L4 laminectomy and fusion 5/2016, chronic L2 fracture, loosening screws. Pt. still having low back pain that radiates to bilateral lower extremities R>L, has been treated with PT, NSAID's, APRYL without much relief - pt. presents to PST today for scheduled T10-Pelvis Posterior Revision Laminectomy and Spinal Fusion on 8/27/19. Pt. with SAINI - which is unchanged for many years, denies recent fever, chills, chest pain, SOB, changes in bowel/urinary habits.  Obtained last EKG and Echo from cardiologist office - in chart (EF 50-54%)  Pt. will continue on aspirin during reynaldo-op period, pt. will see PCP pre-op (13 Aug 2019 16:11)    Interval History:  On 8/27 underwent T11-L4 revision and fusion.  Patient underwent L4-5, L5-S1 ALIF on 9/3.  Post-op had shoulder pain- responded well to injection on 9/4.  Blood sugars followed by endocrine.    REVIEW OF SYSTEMS: No chest pain, shortness of breath, nausea, vomiting or diarhea; other ROS neg     PAST MEDICAL & SURGICAL HISTORY  ETOH abuse  Chronic low back pain  Lumbar compression fracture  Acute UTI  Acute CHF  LBBB (left bundle branch block)  Lumbar disc disease  Transient cerebral ischemia, unspecified type  Coronary artery disease involving native coronary artery of native heart without angina pectoris  Dyslipidemia  Benign prostatic hypertrophy  Hypertension  Type 2 diabetes mellitus  S/P TURP  S/P lumbar laminectomy  Status post coronary artery bypass graft    FUNCTIONAL HISTORY:   Lives w wife in home w NICHOLAS.  PTA Independent    CURRENT FUNCTIONAL STATUS:  Mod A transfers, min A gait.     FAMILY HISTORY   N/C    RECENT LABS/IMAGING  CBC Full  -  ( 06 Sep 2019 10:30 )  WBC Count : 17.97 K/uL  RBC Count : 3.15 M/uL  Hemoglobin : 9.2 g/dL  Hematocrit : 27.4 %  Platelet Count - Automated : 328 K/uL  Mean Cell Volume : 87.0 fl  Mean Cell Hemoglobin : 29.2 pg  Mean Cell Hemoglobin Concentration : 33.6 gm/dL  Auto Neutrophil # : 16.05 K/uL  Auto Lymphocyte # : 0.55 K/uL  Auto Monocyte # : 1.13 K/uL  Auto Eosinophil # : 0.04 K/uL  Auto Basophil # : 0.03 K/uL  Auto Neutrophil % : 89.3 %  Auto Lymphocyte % : 3.1 %  Auto Monocyte % : 6.3 %  Auto Eosinophil % : 0.2 %  Auto Basophil % : 0.2 %    09-06    134<L>  |  99  |  34<H>  ----------------------------<  179<H>  3.8   |  21<L>  |  1.58<H>    Ca    8.8      06 Sep 2019 07:01    VITALS  T(C): 37.1 (09-06-19 @ 08:35), Max: 37.4 (09-05-19 @ 15:49)  HR: 87 (09-06-19 @ 08:35) (86 - 100)  BP: 131/86 (09-06-19 @ 08:35) (120/81 - 154/86)  RR: 18 (09-06-19 @ 08:35) (18 - 18)  SpO2: 95% (09-06-19 @ 08:35) (92% - 98%)  Wt(kg): --    ALLERGIES  No Known Allergies    MEDICATIONS   acetaminophen   Tablet .. 650 milliGRAM(s) Oral every 6 hours PRN  atorvastatin 10 milliGRAM(s) Oral at bedtime  ceFAZolin   IVPB 2000 milliGRAM(s) IV Intermittent every 8 hours  cholecalciferol 2000 Unit(s) Oral daily  cyclobenzaprine 5 milliGRAM(s) Oral three times a day PRN  diazepam    Tablet 5 milliGRAM(s) Oral every 12 hours PRN  docusate sodium 100 milliGRAM(s) Oral three times a day  ferrous    sulfate 325 milliGRAM(s) Oral daily  finasteride 5 milliGRAM(s) Oral daily  furosemide    Tablet 20 milliGRAM(s) Oral every 24 hours  glycerin Suppository - Adult 1 Suppository(s) Rectal once  guaiFENesin    Syrup 100 milliGRAM(s) Oral every 6 hours PRN  HYDROmorphone  Injectable 0.5 milliGRAM(s) SubCutaneous every 6 hours PRN  insulin glargine Injectable (LANTUS) 25 Unit(s) SubCutaneous at bedtime  insulin lispro (HumaLOG) corrective regimen sliding scale   SubCutaneous three times a day before meals  insulin lispro (HumaLOG) corrective regimen sliding scale   SubCutaneous at bedtime  insulin lispro Injectable (HumaLOG) 8 Unit(s) SubCutaneous three times a day before meals  lactated ringers. 1000 milliLiter(s) IV Continuous <Continuous>  lactulose Syrup 10 Gram(s) Oral every 6 hours  losartan 25 milliGRAM(s) Oral daily  magnesium hydroxide Suspension 30 milliLiter(s) Oral daily PRN  ondansetron Injectable 4 milliGRAM(s) IV Push every 6 hours PRN  oxyCODONE    IR 5 milliGRAM(s) Oral every 4 hours PRN  oxyCODONE    IR 10 milliGRAM(s) Oral every 4 hours PRN  pantoprazole    Tablet 40 milliGRAM(s) Oral before breakfast  polyethylene glycol 3350 17 Gram(s) Oral daily PRN  pregabalin 100 milliGRAM(s) Oral two times a day  senna 2 Tablet(s) Oral at bedtime  tamsulosin 0.4 milliGRAM(s) Oral two times a day  traMADol 50 milliGRAM(s) Oral every 4 hours PRN      ----------------------------------------------------------------------------------------  PHYSICAL EXAM  Constitutional - NAD, Comfortable  HEENT - NCAT, EOMI  Neck - Supple, No limited ROM  Chest - CTA bilaterally, No wheeze, No rhonchi, No crackles  Cardiovascular - RRR, S1S2, No murmurs  Abdomen - BS+, Soft, NTND  Extremities - No C/C/E, No calf tenderness   Neurologic Exam -                    Cognitive - Awake, Alert, AAO to self, place, date, year, situation     Communication - Fluent, No dysarthria, no aphasia     Cranial Nerves - CN 2-12 intact     Motor - No focal deficits      Sensory - Intact to LT     Reflexes - DTR Intact, No primitive reflexive       Psychiatric - Mood stable, Affect WNL      Impression:  67 yo with functional deficits secondary to diagnosis of lumbar stenosis.     Plan:  PT- ROM, Bed Mob, Transfers, Amb w AD   OT- ADLs  Prec- Falls, Cardiac, Pulm  DVT Prophylaxis- ASA  Skin- Turn q2 h  Dispo- Patient is a 68y old  Male who presents with a chief complaint of laminectomy and fusion (05 Sep 2019 12:29)    Admission HPI:  67 yo Moldovan speaking male from [facility], PMH CAD s/p CABG X 4 2015, CHF - no recent events, DM2, BPH s/p TURP 2017, hospitalized in 3/2019 with UTI 2/2 urinary retention - following up with urology, spinal stenosis s/p T10-L4 laminectomy and fusion 5/2016, chronic L2 fracture, loosening screws. Pt. still having low back pain that radiates to bilateral lower extremities R>L, has been treated with PT, NSAID's, APRYL without much relief - pt. presents to PST today for scheduled T10-Pelvis Posterior Revision Laminectomy and Spinal Fusion on 8/27/19. Pt. with SAINI - which is unchanged for many years, denies recent fever, chills, chest pain, SOB, changes in bowel/urinary habits.  Obtained last EKG and Echo from cardiologist office - in chart (EF 50-54%)  Pt. will continue on aspirin during reynaldo-op period, pt. will see PCP pre-op (13 Aug 2019 16:11)    Interval History:  On 8/27 underwent T11-L4 revision and fusion.  Patient underwent L4-5, L5-S1 ALIF on 9/3.  Post-op had shoulder pain- responded well to injection on 9/4.  Blood sugars followed by endocrine.  Daughter and wife at bedside.    REVIEW OF SYSTEMS: Back pain, + poor balance, No chest pain, shortness of breath, nausea, vomiting or diarhea; other ROS neg     PAST MEDICAL & SURGICAL HISTORY  ETOH abuse  Chronic low back pain  Lumbar compression fracture  Acute UTI  Acute CHF  LBBB (left bundle branch block)  Lumbar disc disease  Transient cerebral ischemia, unspecified type  Coronary artery disease involving native coronary artery of native heart without angina pectoris  Dyslipidemia  Benign prostatic hypertrophy  Hypertension  Type 2 diabetes mellitus  S/P TURP  S/P lumbar laminectomy  Status post coronary artery bypass graft    FUNCTIONAL HISTORY:   Lives w wife in home w NICHOLAS.  PTA Independent    CURRENT FUNCTIONAL STATUS:  Mod A transfers, min A gait.     FAMILY HISTORY   N/C    RECENT LABS/IMAGING  CBC Full  -  ( 06 Sep 2019 10:30 )  WBC Count : 17.97 K/uL  RBC Count : 3.15 M/uL  Hemoglobin : 9.2 g/dL  Hematocrit : 27.4 %  Platelet Count - Automated : 328 K/uL  Mean Cell Volume : 87.0 fl  Mean Cell Hemoglobin : 29.2 pg  Mean Cell Hemoglobin Concentration : 33.6 gm/dL  Auto Neutrophil # : 16.05 K/uL  Auto Lymphocyte # : 0.55 K/uL  Auto Monocyte # : 1.13 K/uL  Auto Eosinophil # : 0.04 K/uL  Auto Basophil # : 0.03 K/uL  Auto Neutrophil % : 89.3 %  Auto Lymphocyte % : 3.1 %  Auto Monocyte % : 6.3 %  Auto Eosinophil % : 0.2 %  Auto Basophil % : 0.2 %    09-06    134<L>  |  99  |  34<H>  ----------------------------<  179<H>  3.8   |  21<L>  |  1.58<H>    Ca    8.8      06 Sep 2019 07:01    VITALS  T(C): 37.1 (09-06-19 @ 08:35), Max: 37.4 (09-05-19 @ 15:49)  HR: 87 (09-06-19 @ 08:35) (86 - 100)  BP: 131/86 (09-06-19 @ 08:35) (120/81 - 154/86)  RR: 18 (09-06-19 @ 08:35) (18 - 18)  SpO2: 95% (09-06-19 @ 08:35) (92% - 98%)  Wt(kg): --    ALLERGIES  No Known Allergies    MEDICATIONS   acetaminophen   Tablet .. 650 milliGRAM(s) Oral every 6 hours PRN  atorvastatin 10 milliGRAM(s) Oral at bedtime  ceFAZolin   IVPB 2000 milliGRAM(s) IV Intermittent every 8 hours  cholecalciferol 2000 Unit(s) Oral daily  cyclobenzaprine 5 milliGRAM(s) Oral three times a day PRN  diazepam    Tablet 5 milliGRAM(s) Oral every 12 hours PRN  docusate sodium 100 milliGRAM(s) Oral three times a day  ferrous    sulfate 325 milliGRAM(s) Oral daily  finasteride 5 milliGRAM(s) Oral daily  furosemide    Tablet 20 milliGRAM(s) Oral every 24 hours  glycerin Suppository - Adult 1 Suppository(s) Rectal once  guaiFENesin    Syrup 100 milliGRAM(s) Oral every 6 hours PRN  HYDROmorphone  Injectable 0.5 milliGRAM(s) SubCutaneous every 6 hours PRN  insulin glargine Injectable (LANTUS) 25 Unit(s) SubCutaneous at bedtime  insulin lispro (HumaLOG) corrective regimen sliding scale   SubCutaneous three times a day before meals  insulin lispro (HumaLOG) corrective regimen sliding scale   SubCutaneous at bedtime  insulin lispro Injectable (HumaLOG) 8 Unit(s) SubCutaneous three times a day before meals  lactated ringers. 1000 milliLiter(s) IV Continuous <Continuous>  lactulose Syrup 10 Gram(s) Oral every 6 hours  losartan 25 milliGRAM(s) Oral daily  magnesium hydroxide Suspension 30 milliLiter(s) Oral daily PRN  ondansetron Injectable 4 milliGRAM(s) IV Push every 6 hours PRN  oxyCODONE    IR 5 milliGRAM(s) Oral every 4 hours PRN  oxyCODONE    IR 10 milliGRAM(s) Oral every 4 hours PRN  pantoprazole    Tablet 40 milliGRAM(s) Oral before breakfast  polyethylene glycol 3350 17 Gram(s) Oral daily PRN  pregabalin 100 milliGRAM(s) Oral two times a day  senna 2 Tablet(s) Oral at bedtime  tamsulosin 0.4 milliGRAM(s) Oral two times a day  traMADol 50 milliGRAM(s) Oral every 4 hours PRN      ----------------------------------------------------------------------------------------  PHYSICAL EXAM  Constitutional - NAD, Comfortable  HEENT - NCAT, EOMI  Neck - Supple, No limited ROM  Chest - CTA bilaterally, No wheeze, No rhonchi, No crackles  Cardiovascular - RRR, S1S2, No murmurs  Abdomen - BS+, Soft, NTND  Extremities - No C/C/E, No calf tenderness   Neurologic Exam -                  Motor 4+/5 bl LE and 5/5 bl UEs      Sensation intact    No clonus  Psychiatric - Mood stable, Affect WNL      Impression:  67 yo with functional deficits secondary to diagnosis of lumbar stenosis.     Plan:  PT- ROM, Bed Mob, Transfers, Amb w AD   OT- ADLs  Prec- Falls, Cardiac, Pulm  DVT Prophylaxis- ASA  Skin- Turn q2 h  Dispo- Acute Rehab- can tolerate 3h/d PT/OT/SLP and requires daily physician visits

## 2019-09-06 NOTE — PROGRESS NOTE ADULT - SUBJECTIVE AND OBJECTIVE BOX
Plastic Surgery     SUBJECTIVE:  The patient was seen and examined. No acute events overnight. Pain controlled.    OBJECTIVE:   Vital Signs Last 24 Hrs  T(C): 36.8 (06 Sep 2019 05:21), Max: 37.7 (05 Sep 2019 09:12)  T(F): 98.2 (06 Sep 2019 05:21), Max: 99.8 (05 Sep 2019 09:12)  HR: 91 (06 Sep 2019 05:21) (86 - 100)  BP: 149/81 (06 Sep 2019 05:21) (120/81 - 154/89)  BP(mean): --  RR: 18 (06 Sep 2019 05:21) (18 - 18)  SpO2: 96% (06 Sep 2019 05:21) (92% - 98%)    General: NAD, Lying in bed comfortably  Back: No collections, drain sites CDI, dressing CDI without strikethrough. Remaining [facility]    09-05    131<L>  |  98  |  32<H>  ----------------------------<  151<H>  4.1   |  20<L>  |  1.66<H>    Ca    8.3<L>      05 Sep 2019 07:03                            9.3    17.34 )-----------( 259      ( 05 Sep 2019 09:39 )             28.2

## 2019-09-06 NOTE — PROGRESS NOTE ADULT - PROBLEM SELECTOR PLAN 1
-Test BG ac and hs   -C/w Lantus dose  25 units tonight   -Humalog 8 units ac meals   -Continue low dose Humalog correction scales ac and hs  -Plan discussed with pt's daughter/team.  Contact info: [phone] (24/7). pager [phone] -Test BG ac and hs   -Change Lantus to 26  units qhs  -Change Humalog to 9 units ac meals   -Continue low dose Humalog correction scales ac and hs  -Plan discussed with pt/team/RN.  Contact info: [phone] (24/7). pager [phone]

## 2019-09-07 LAB
GLUCOSE BLDC GLUCOMTR-MCNC: 123 MG/DL — HIGH (ref 70–99)
GLUCOSE BLDC GLUCOMTR-MCNC: 123 MG/DL — HIGH (ref 70–99)
GLUCOSE BLDC GLUCOMTR-MCNC: 205 MG/DL — HIGH (ref 70–99)
GLUCOSE BLDC GLUCOMTR-MCNC: 238 MG/DL — HIGH (ref 70–99)

## 2019-09-07 RX ADMIN — Medication 325 MILLIGRAM(S): at 12:38

## 2019-09-07 RX ADMIN — Medication 20 MILLIGRAM(S): at 06:00

## 2019-09-07 RX ADMIN — LOSARTAN POTASSIUM 25 MILLIGRAM(S): 100 TABLET, FILM COATED ORAL at 06:00

## 2019-09-07 RX ADMIN — LACTULOSE 10 GRAM(S): 10 SOLUTION ORAL at 12:38

## 2019-09-07 RX ADMIN — OXYCODONE HYDROCHLORIDE 10 MILLIGRAM(S): 5 TABLET ORAL at 21:10

## 2019-09-07 RX ADMIN — Medication 9 UNIT(S): at 12:38

## 2019-09-07 RX ADMIN — OXYCODONE HYDROCHLORIDE 10 MILLIGRAM(S): 5 TABLET ORAL at 21:40

## 2019-09-07 RX ADMIN — Medication 2: at 18:16

## 2019-09-07 RX ADMIN — ATORVASTATIN CALCIUM 10 MILLIGRAM(S): 80 TABLET, FILM COATED ORAL at 21:04

## 2019-09-07 RX ADMIN — Medication 100 MILLIGRAM(S): at 06:01

## 2019-09-07 RX ADMIN — OXYCODONE HYDROCHLORIDE 10 MILLIGRAM(S): 5 TABLET ORAL at 13:10

## 2019-09-07 RX ADMIN — Medication 100 MILLIGRAM(S): at 12:38

## 2019-09-07 RX ADMIN — PANTOPRAZOLE SODIUM 40 MILLIGRAM(S): 20 TABLET, DELAYED RELEASE ORAL at 06:00

## 2019-09-07 RX ADMIN — Medication 100 MILLIGRAM(S): at 18:15

## 2019-09-07 RX ADMIN — Medication 9 UNIT(S): at 08:08

## 2019-09-07 RX ADMIN — LACTULOSE 10 GRAM(S): 10 SOLUTION ORAL at 18:15

## 2019-09-07 RX ADMIN — Medication 100 MILLIGRAM(S): at 21:05

## 2019-09-07 RX ADMIN — Medication 100 MILLIGRAM(S): at 06:00

## 2019-09-07 RX ADMIN — LACTULOSE 10 GRAM(S): 10 SOLUTION ORAL at 23:24

## 2019-09-07 RX ADMIN — OXYCODONE HYDROCHLORIDE 10 MILLIGRAM(S): 5 TABLET ORAL at 12:39

## 2019-09-07 RX ADMIN — OXYCODONE HYDROCHLORIDE 10 MILLIGRAM(S): 5 TABLET ORAL at 06:38

## 2019-09-07 RX ADMIN — Medication 2000 UNIT(S): at 12:38

## 2019-09-07 RX ADMIN — TAMSULOSIN HYDROCHLORIDE 0.4 MILLIGRAM(S): 0.4 CAPSULE ORAL at 06:01

## 2019-09-07 RX ADMIN — Medication 9 UNIT(S): at 18:16

## 2019-09-07 RX ADMIN — OXYCODONE HYDROCHLORIDE 10 MILLIGRAM(S): 5 TABLET ORAL at 06:01

## 2019-09-07 RX ADMIN — FINASTERIDE 5 MILLIGRAM(S): 5 TABLET, FILM COATED ORAL at 12:38

## 2019-09-07 RX ADMIN — INSULIN GLARGINE 26 UNIT(S): 100 INJECTION, SOLUTION SUBCUTANEOUS at 22:35

## 2019-09-07 RX ADMIN — SENNA PLUS 2 TABLET(S): 8.6 TABLET ORAL at 21:04

## 2019-09-07 RX ADMIN — TAMSULOSIN HYDROCHLORIDE 0.4 MILLIGRAM(S): 0.4 CAPSULE ORAL at 18:15

## 2019-09-07 NOTE — PROGRESS NOTE ADULT - SUBJECTIVE AND OBJECTIVE BOX
Patient is a 68y old  Male who presents with a chief complaint chronic back pain  S/P T11-S1 Laminectomy, fusion 8/27/19, ALIF POD#4  Patient comfortable  No complaints    T(C): 37 (09-07-19 @ 05:09), Max: 37.1 (09-06-19 @ 08:35)  HR: 80 (09-07-19 @ 05:09) (80 - 97)  BP: 128/84 (09-07-19 @ 05:09) (121/75 - 160/90)  RR: 18 (09-07-19 @ 05:09) (16 - 18)  SpO2: 97% (09-07-19 @ 05:09) (95% - 98%)    PHYSICAL EXAM:  NAD, Alert  Back: Dressing C/D/I; Peters in place, (+) Distal Pulses; No Calf tenderness B/L,             [ ] Lower extremeity                  PF          DF         EHL       FHL                                                                                            R        5/5        5/5        5/5       5/5                                                        L         5/5        5/5        5/5       5/5      LABS:                      9.2    17.97 )-----------( 328      ( 06 Sep 2019 10:30 )             27.4   09-06  134<L>  |  99  |  34<H>  ----------------------------<  179<H>  3.8   |  21<L>  |  1.58<H>  Ca    8.8      06 Sep 2019 07:01

## 2019-09-07 NOTE — PROGRESS NOTE ADULT - ASSESSMENT
67 y/o M S/P T11-S1 Laminectomy, fusion 8/27/19, ALIF POD#4, physical therapy, rehab   Sarika Navarro PA-C  Orthopaedic Surgery  Team pager [phone]  [facility] [phone]  [phone]

## 2019-09-07 NOTE — PROGRESS NOTE ADULT - ASSESSMENT
A/P: 68M s/p T11-S1 Lami/Fusion.    S/p T11-S1 Lami/Fusion:S/p ALIF     Ortho spine f/up noted.  Pain control through Dilaudid PCA      DM II:  Lantus/Humalog/FSSS  Endo f/up noted.    Lt shoulder pain:  S/p steroid injection      HTN:  Cw lasix/Losartan    CKD IV:  BMP/Cr stable    Dw family A/P: 68M s/p T11-S1 Lami/Fusion.    S/p T11-S1 Lami/Fusion:S/p ALIF     Ortho spine f/up noted.  Pain control through Dilaudid IV and Oxy IR      DM II:  Lantus/Humalog/FSSS  Endo f/up noted.    Lt shoulder pain:  S/p steroid injection      HTN:  Cw lasix/Losartan    CKD IV:  BMP/Cr stable    Dw family

## 2019-09-08 LAB
GLUCOSE BLDC GLUCOMTR-MCNC: 140 MG/DL — HIGH (ref 70–99)
GLUCOSE BLDC GLUCOMTR-MCNC: 146 MG/DL — HIGH (ref 70–99)
GLUCOSE BLDC GLUCOMTR-MCNC: 209 MG/DL — HIGH (ref 70–99)
GLUCOSE BLDC GLUCOMTR-MCNC: 99 MG/DL — SIGNIFICANT CHANGE UP (ref 70–99)

## 2019-09-08 PROCEDURE — 99232 SBSQ HOSP IP/OBS MODERATE 35: CPT

## 2019-09-08 PROCEDURE — 74018 RADEX ABDOMEN 1 VIEW: CPT | Mod: 26

## 2019-09-08 RX ORDER — GLUCAGON INJECTION, SOLUTION 0.5 MG/.1ML
1 INJECTION, SOLUTION SUBCUTANEOUS ONCE
Refills: 0 | Status: DISCONTINUED | OUTPATIENT
Start: 2019-09-08 | End: 2019-09-11

## 2019-09-08 RX ORDER — SODIUM CHLORIDE 9 MG/ML
1000 INJECTION, SOLUTION INTRAVENOUS
Refills: 0 | Status: DISCONTINUED | OUTPATIENT
Start: 2019-09-08 | End: 2019-09-11

## 2019-09-08 RX ORDER — DEXTROSE 50 % IN WATER 50 %
15 SYRINGE (ML) INTRAVENOUS ONCE
Refills: 0 | Status: DISCONTINUED | OUTPATIENT
Start: 2019-09-08 | End: 2019-09-11

## 2019-09-08 RX ORDER — DEXTROSE 50 % IN WATER 50 %
25 SYRINGE (ML) INTRAVENOUS ONCE
Refills: 0 | Status: DISCONTINUED | OUTPATIENT
Start: 2019-09-08 | End: 2019-09-11

## 2019-09-08 RX ORDER — DEXTROSE 50 % IN WATER 50 %
12.5 SYRINGE (ML) INTRAVENOUS ONCE
Refills: 0 | Status: DISCONTINUED | OUTPATIENT
Start: 2019-09-08 | End: 2019-09-11

## 2019-09-08 RX ORDER — SODIUM CHLORIDE 9 MG/ML
1000 INJECTION INTRAMUSCULAR; INTRAVENOUS; SUBCUTANEOUS
Refills: 0 | Status: DISCONTINUED | OUTPATIENT
Start: 2019-09-08 | End: 2019-09-11

## 2019-09-08 RX ORDER — INSULIN LISPRO 100/ML
10 VIAL (ML) SUBCUTANEOUS
Refills: 0 | Status: DISCONTINUED | OUTPATIENT
Start: 2019-09-08 | End: 2019-09-11

## 2019-09-08 RX ADMIN — Medication 2000 UNIT(S): at 12:29

## 2019-09-08 RX ADMIN — Medication 20 MILLIGRAM(S): at 05:33

## 2019-09-08 RX ADMIN — Medication 9 UNIT(S): at 13:04

## 2019-09-08 RX ADMIN — LACTULOSE 10 GRAM(S): 10 SOLUTION ORAL at 23:21

## 2019-09-08 RX ADMIN — Medication 100 MILLIGRAM(S): at 12:29

## 2019-09-08 RX ADMIN — Medication 325 MILLIGRAM(S): at 12:29

## 2019-09-08 RX ADMIN — Medication 10 MILLIGRAM(S): at 17:57

## 2019-09-08 RX ADMIN — OXYCODONE HYDROCHLORIDE 10 MILLIGRAM(S): 5 TABLET ORAL at 11:20

## 2019-09-08 RX ADMIN — PANTOPRAZOLE SODIUM 40 MILLIGRAM(S): 20 TABLET, DELAYED RELEASE ORAL at 06:31

## 2019-09-08 RX ADMIN — FINASTERIDE 5 MILLIGRAM(S): 5 TABLET, FILM COATED ORAL at 12:29

## 2019-09-08 RX ADMIN — SENNA PLUS 2 TABLET(S): 8.6 TABLET ORAL at 21:54

## 2019-09-08 RX ADMIN — SODIUM CHLORIDE 75 MILLILITER(S): 9 INJECTION INTRAMUSCULAR; INTRAVENOUS; SUBCUTANEOUS at 20:00

## 2019-09-08 RX ADMIN — Medication 1 SUPPOSITORY(S): at 17:58

## 2019-09-08 RX ADMIN — Medication 100 MILLIGRAM(S): at 05:32

## 2019-09-08 RX ADMIN — TAMSULOSIN HYDROCHLORIDE 0.4 MILLIGRAM(S): 0.4 CAPSULE ORAL at 05:33

## 2019-09-08 RX ADMIN — Medication 100 MILLIGRAM(S): at 21:54

## 2019-09-08 RX ADMIN — Medication 2: at 13:04

## 2019-09-08 RX ADMIN — OXYCODONE HYDROCHLORIDE 10 MILLIGRAM(S): 5 TABLET ORAL at 06:00

## 2019-09-08 RX ADMIN — Medication 100 MILLIGRAM(S): at 06:31

## 2019-09-08 RX ADMIN — OXYCODONE HYDROCHLORIDE 10 MILLIGRAM(S): 5 TABLET ORAL at 10:47

## 2019-09-08 RX ADMIN — OXYCODONE HYDROCHLORIDE 10 MILLIGRAM(S): 5 TABLET ORAL at 05:31

## 2019-09-08 RX ADMIN — Medication 10 UNIT(S): at 18:06

## 2019-09-08 RX ADMIN — Medication 100 MILLIGRAM(S): at 17:57

## 2019-09-08 RX ADMIN — LOSARTAN POTASSIUM 25 MILLIGRAM(S): 100 TABLET, FILM COATED ORAL at 05:33

## 2019-09-08 RX ADMIN — LACTULOSE 10 GRAM(S): 10 SOLUTION ORAL at 17:58

## 2019-09-08 RX ADMIN — TAMSULOSIN HYDROCHLORIDE 0.4 MILLIGRAM(S): 0.4 CAPSULE ORAL at 17:58

## 2019-09-08 RX ADMIN — Medication 9 UNIT(S): at 09:03

## 2019-09-08 RX ADMIN — INSULIN GLARGINE 26 UNIT(S): 100 INJECTION, SOLUTION SUBCUTANEOUS at 21:56

## 2019-09-08 RX ADMIN — ATORVASTATIN CALCIUM 10 MILLIGRAM(S): 80 TABLET, FILM COATED ORAL at 21:54

## 2019-09-08 RX ADMIN — LACTULOSE 10 GRAM(S): 10 SOLUTION ORAL at 12:29

## 2019-09-08 NOTE — PROGRESS NOTE ADULT - SUBJECTIVE AND OBJECTIVE BOX
Surgery Progress Note     Subjective/24hour Events:   Patient seen and examined. Patient has been complaining of bloating and sensation of feeling full quickly for the past 2-3 days, with persistent baseline abdominal distension. Patient has been passing gas and per patient had a bowel movement yesterday (per nursing, patient last had a bowel movement on 9/6, two days ago). Denies nausea or vomiting. Pain controlled.     Vital Signs:  Vital Signs Last 24 Hrs  T(C): 37.1 (08 Sep 2019 15:40), Max: 37.3 (08 Sep 2019 01:20)  T(F): 98.8 (08 Sep 2019 15:40), Max: 99.1 (08 Sep 2019 01:20)  HR: 91 (08 Sep 2019 15:40) (85 - 99)  BP: 119/71 (08 Sep 2019 15:40) (119/71 - 160/85)  BP(mean): --  RR: 18 (08 Sep 2019 15:40) (18 - 18)  SpO2: 98% (08 Sep 2019 15:40) (96% - 98%)    CAPILLARY BLOOD GLUCOSE      POCT Blood Glucose.: 140 mg/dL (08 Sep 2019 18:05)  POCT Blood Glucose.: 209 mg/dL (08 Sep 2019 12:34)  POCT Blood Glucose.: 146 mg/dL (08 Sep 2019 08:29)  POCT Blood Glucose.: 205 mg/dL (07 Sep 2019 21:58)      I&O's Detail    07 Sep 2019 07:01  -  08 Sep 2019 07:00  --------------------------------------------------------  IN:    Oral Fluid: 740 mL  Total IN: 740 mL    OUT:    Indwelling Catheter - Urethral: 5700 mL  Total OUT: 5700 mL    Total NET: -4960 mL      08 Sep 2019 07:01  -  08 Sep 2019 18:31  --------------------------------------------------------  IN:    Oral Fluid: 740 mL  Total IN: 740 mL    OUT:    Indwelling Catheter - Urethral: 1300 mL  Total OUT: 1300 mL    Total NET: -560 mL          MEDICATIONS  (STANDING):  atorvastatin 10 milliGRAM(s) Oral at bedtime  cholecalciferol 2000 Unit(s) Oral daily  dextrose 5%. 1000 milliLiter(s) (50 mL/Hr) IV Continuous <Continuous>  dextrose 50% Injectable 12.5 Gram(s) IV Push once  dextrose 50% Injectable 25 Gram(s) IV Push once  dextrose 50% Injectable 25 Gram(s) IV Push once  docusate sodium 100 milliGRAM(s) Oral three times a day  ferrous    sulfate 325 milliGRAM(s) Oral daily  finasteride 5 milliGRAM(s) Oral daily  furosemide    Tablet 20 milliGRAM(s) Oral every 24 hours  insulin glargine Injectable (LANTUS) 26 Unit(s) SubCutaneous at bedtime  insulin lispro (HumaLOG) corrective regimen sliding scale   SubCutaneous three times a day before meals  insulin lispro (HumaLOG) corrective regimen sliding scale   SubCutaneous at bedtime  insulin lispro Injectable (HumaLOG) 10 Unit(s) SubCutaneous three times a day before meals  lactated ringers. 1000 milliLiter(s) (125 mL/Hr) IV Continuous <Continuous>  lactulose Syrup 10 Gram(s) Oral every 6 hours  losartan 25 milliGRAM(s) Oral daily  pantoprazole    Tablet 40 milliGRAM(s) Oral before breakfast  pregabalin 100 milliGRAM(s) Oral two times a day  senna 2 Tablet(s) Oral at bedtime  tamsulosin 0.4 milliGRAM(s) Oral two times a day    MEDICATIONS  (PRN):  acetaminophen   Tablet .. 650 milliGRAM(s) Oral every 6 hours PRN Temp greater or equal to 38C (100.4F), Mild Pain (1 - 3)  bisacodyl Suppository 10 milliGRAM(s) Rectal daily PRN Constipation  cyclobenzaprine 5 milliGRAM(s) Oral three times a day PRN Muscle Spasm  dextrose 40% Gel 15 Gram(s) Oral once PRN Blood Glucose LESS THAN 70 milliGRAM(s)/deciLiter  diazepam    Tablet 5 milliGRAM(s) Oral every 12 hours PRN Anxiety  glucagon  Injectable 1 milliGRAM(s) IntraMuscular once PRN Glucose <70 milliGRAM(s)/deciLiter  guaiFENesin    Syrup 100 milliGRAM(s) Oral every 6 hours PRN congestion  HYDROmorphone  Injectable 0.5 milliGRAM(s) SubCutaneous every 6 hours PRN breakthrough pain  magnesium hydroxide Suspension 30 milliLiter(s) Oral daily PRN Constipation  ondansetron Injectable 4 milliGRAM(s) IV Push every 6 hours PRN Nausea  oxyCODONE    IR 5 milliGRAM(s) Oral every 4 hours PRN Moderate Pain (4 - 6)  oxyCODONE    IR 10 milliGRAM(s) Oral every 4 hours PRN Severe Pain (7 - 10)  polyethylene glycol 3350 17 Gram(s) Oral daily PRN Constipation  saline laxative (FLEET) Rectal Enema 1 Enema Rectal once PRN Persistent constipation  simethicone 80 milliGRAM(s) Chew two times a day PRN Gas  traMADol 50 milliGRAM(s) Oral every 4 hours PRN Mild Pain (1 - 3)      Physical Exam:  Constitutional: NAD, A&O x 3  Respiratory: non labored breathing   Gastrointestinal: abd soft, moderately distended, mild tenderness around incision at left lower quadrant abd. incision with Aquacel dressing in place, dressing appeared clean, intact, no strikethrough bleeding, Dressing was changed with 4x4 and Tegederm.    Vascular: + palpable femoral pulses b/l, palp DP/PT b/l              Imaging:  Abdominal x-ray pending

## 2019-09-08 NOTE — PROGRESS NOTE ADULT - ASSESSMENT
A/P:  67yo M s/p L4-S1 ALIF with vascular exposure to the anterior lumbar spine with 2-3 days of abdominal fullness for 2-3 days  -F/U abdominal x-ray, ordered  -F/U suppository and, if performed, fleet enema   -Recommend to minimize narcotic analgesic use  -NPO and IV fluids, replete electrolytes  -Mnitor GI function  -If vomits, patient will need NGT

## 2019-09-08 NOTE — PROGRESS NOTE ADULT - ASSESSMENT
69 y/o M w/h/o uncontrolled  T2DM>on insulin basal/bolus PTA. DM c/b CAD> s/p CABG and  CKD3. H/o spinal stenosis s/p T10-L4 Laminectomy in 2016  now admitted fro revision of laminectomy  and spinal fusion on 8/27 and now requiring anterior lumbar interbody fusion 9/3/19. Pt tolerating POs now w/prandial glucose values in 200s. BG goal (100-180mg/dl).

## 2019-09-08 NOTE — PROGRESS NOTE ADULT - REASON FOR ADMISSION
Laminectomy revision
Revision Laminectomy and Spinal Fusion T10-Pelvis
Back pain
Chronic back pain
Revision Laminectomy and Spinal Fusion T10-Pelvis
ALIF/Post lami
Laminectomy revision
Laminectomy revision
laminectomy and fusion

## 2019-09-08 NOTE — PROGRESS NOTE ADULT - SUBJECTIVE AND OBJECTIVE BOX
Patient is a 68y old  Male who presents with a chief complaint of Chronic back pain (08 Sep 2019 07:16)      SUBJECTIVE / OVERNIGHT EVENTS:    Events noted. Abdominal discomfort  CONSTITUTIONAL: No fever,  or fatigue  RESPIRATORY: No cough, wheezing,  No shortness of breath  CARDIOVASCULAR: No chest pain, palpitations,  NEUROLOGICAL: No headaches,     MEDICATIONS  (STANDING):  atorvastatin 10 milliGRAM(s) Oral at bedtime  cholecalciferol 2000 Unit(s) Oral daily  dextrose 5%. 1000 milliLiter(s) (50 mL/Hr) IV Continuous <Continuous>  dextrose 50% Injectable 12.5 Gram(s) IV Push once  dextrose 50% Injectable 25 Gram(s) IV Push once  dextrose 50% Injectable 25 Gram(s) IV Push once  docusate sodium 100 milliGRAM(s) Oral three times a day  ferrous    sulfate 325 milliGRAM(s) Oral daily  finasteride 5 milliGRAM(s) Oral daily  furosemide    Tablet 20 milliGRAM(s) Oral every 24 hours  insulin glargine Injectable (LANTUS) 26 Unit(s) SubCutaneous at bedtime  insulin lispro (HumaLOG) corrective regimen sliding scale   SubCutaneous three times a day before meals  insulin lispro (HumaLOG) corrective regimen sliding scale   SubCutaneous at bedtime  insulin lispro Injectable (HumaLOG) 10 Unit(s) SubCutaneous three times a day before meals  lactated ringers. 1000 milliLiter(s) (125 mL/Hr) IV Continuous <Continuous>  lactulose Syrup 10 Gram(s) Oral every 6 hours  losartan 25 milliGRAM(s) Oral daily  pantoprazole    Tablet 40 milliGRAM(s) Oral before breakfast  pregabalin 100 milliGRAM(s) Oral two times a day  senna 2 Tablet(s) Oral at bedtime  sodium chloride 0.9%. 1000 milliLiter(s) (75 mL/Hr) IV Continuous <Continuous>  tamsulosin 0.4 milliGRAM(s) Oral two times a day    MEDICATIONS  (PRN):  acetaminophen   Tablet .. 650 milliGRAM(s) Oral every 6 hours PRN Temp greater or equal to 38C (100.4F), Mild Pain (1 - 3)  bisacodyl Suppository 10 milliGRAM(s) Rectal daily PRN Constipation  cyclobenzaprine 5 milliGRAM(s) Oral three times a day PRN Muscle Spasm  dextrose 40% Gel 15 Gram(s) Oral once PRN Blood Glucose LESS THAN 70 milliGRAM(s)/deciLiter  diazepam    Tablet 5 milliGRAM(s) Oral every 12 hours PRN Anxiety  glucagon  Injectable 1 milliGRAM(s) IntraMuscular once PRN Glucose <70 milliGRAM(s)/deciLiter  guaiFENesin    Syrup 100 milliGRAM(s) Oral every 6 hours PRN congestion  HYDROmorphone  Injectable 0.5 milliGRAM(s) SubCutaneous every 6 hours PRN breakthrough pain  magnesium hydroxide Suspension 30 milliLiter(s) Oral daily PRN Constipation  ondansetron Injectable 4 milliGRAM(s) IV Push every 6 hours PRN Nausea  oxyCODONE    IR 5 milliGRAM(s) Oral every 4 hours PRN Moderate Pain (4 - 6)  oxyCODONE    IR 10 milliGRAM(s) Oral every 4 hours PRN Severe Pain (7 - 10)  polyethylene glycol 3350 17 Gram(s) Oral daily PRN Constipation  saline laxative (FLEET) Rectal Enema 1 Enema Rectal once PRN Persistent constipation  simethicone 80 milliGRAM(s) Chew two times a day PRN Gas  traMADol 50 milliGRAM(s) Oral every 4 hours PRN Mild Pain (1 - 3)        CAPILLARY BLOOD GLUCOSE      POCT Blood Glucose.: 140 mg/dL (08 Sep 2019 18:05)  POCT Blood Glucose.: 209 mg/dL (08 Sep 2019 12:34)  POCT Blood Glucose.: 146 mg/dL (08 Sep 2019 08:29)  POCT Blood Glucose.: 205 mg/dL (07 Sep 2019 21:58)    I&O's Summary    07 Sep 2019 07:01  -  08 Sep 2019 07:00  --------------------------------------------------------  IN: 740 mL / OUT: 5700 mL / NET: -4960 mL    08 Sep 2019 07:01  -  08 Sep 2019 21:43  --------------------------------------------------------  IN: 740 mL / OUT: 1300 mL / NET: -560 mL        PHYSICAL EXAM:    NECK: Supple, No JVD  CHEST/LUNG: Clear to auscultation bilaterally; No wheezing.  HEART: Regular rate and rhythm; No murmurs, rubs, or gallops  ABDOMEN: Soft, Nontender, Nondistended; Bowel sounds present  EXTREMITIES:   No edema  NEUROLOGY: AAO       LABS:                  CAPILLARY BLOOD GLUCOSE      POCT Blood Glucose.: 140 mg/dL (08 Sep 2019 18:05)  POCT Blood Glucose.: 209 mg/dL (08 Sep 2019 12:34)  POCT Blood Glucose.: 146 mg/dL (08 Sep 2019 08:29)  POCT Blood Glucose.: 205 mg/dL (07 Sep 2019 21:58)                RADIOLOGY & ADDITIONAL TESTS:    Imaging Personally Reviewed:    Consultant(s) Notes Reviewed:      Care Discussed with Consultants/Other Providers:

## 2019-09-08 NOTE — PROGRESS NOTE ADULT - PROBLEM SELECTOR PLAN 1
-test BG AC/HS  -c/w Lantus 26 units QHS  -Increase Humalog 10 units AC meals  -c/w Humalog low correction scale AC and Low HS scale  -Discharge plan: basal/bolus  pager: [phone]

## 2019-09-08 NOTE — PROGRESS NOTE ADULT - ASSESSMENT
69 y/o M s/p T11-S1 Laminectomy, fusion 8/27/19, ALIF POD#5, laxatives, muscle relaxant, physical therapy, rehab  Sarika Navarro PA-C  Orthopaedic Surgery  Team pager [phone]  [facility] [phone]  [phone]

## 2019-09-08 NOTE — PROGRESS NOTE ADULT - SUBJECTIVE AND OBJECTIVE BOX
Diabetes Follow up note:  Interval Hx:  67 y/o M w/h/o uncontrolled  T2DM>on insulin basal/bolus PTA. DM c/b CAD> s/p CABG and  CKD3. H/o spinal stenosis s/p T10-L4 Laminectomy in 2016  now admitted fro revision of laminectomy and spinal fusion on 8/27 and requiring anterior lumbar interbody fusion 9/3/19. BG values at goal in AM but having post-prandial hyperglycemia this weekend. Reports good appetite. Wife at bedside. Pt uses 10 units w/meals of rapid acting insulin at home.     Review of Systems:  General: no complaints.   GI: Tolerating POs without any N/V/D/ABD PAIN.  CV: No CP/SOB  ENDO: No S&Sx of hypoglycemia  MEDS:  atorvastatin 10 milliGRAM(s) Oral at bedtime  finasteride 5 milliGRAM(s) Oral daily  insulin glargine Injectable (LANTUS) 26 Unit(s) SubCutaneous at bedtime  insulin lispro (HumaLOG) corrective regimen sliding scale   SubCutaneous three times a day before meals  insulin lispro (HumaLOG) corrective regimen sliding scale   SubCutaneous at bedtime  insulin lispro Injectable (HumaLOG) 9 Unit(s) SubCutaneous three times a day before meals      Allergies    No Known Allergies        PE:  General: Male sitting in chair. NAD.   Vital Signs Last 24 Hrs  T(C): 36.7 (08 Sep 2019 13:06), Max: 37.3 (08 Sep 2019 01:20)  T(F): 98.1 (08 Sep 2019 13:06), Max: 99.1 (08 Sep 2019 01:20)  HR: 98 (08 Sep 2019 13:06) (85 - 99)  BP: 124/76 (08 Sep 2019 13:06) (121/81 - 160/85)  BP(mean): --  RR: 18 (08 Sep 2019 13:06) (18 - 18)  SpO2: 96% (08 Sep 2019 13:06) (96% - 97%)  Chest: brace in place.   Abd: Soft, NT,ND,   Extremities: Warm. no edema x 4 ext.   Neuro: A&O X3    LABS:    POCT Blood Glucose.: 209 mg/dL (09-08-19 @ 12:34)  POCT Blood Glucose.: 146 mg/dL (09-08-19 @ 08:29)  POCT Blood Glucose.: 205 mg/dL (09-07-19 @ 21:58)  POCT Blood Glucose.: 238 mg/dL (09-07-19 @ 18:14)  POCT Blood Glucose.: 123 mg/dL (09-07-19 @ 12:20)  POCT Blood Glucose.: 123 mg/dL (09-07-19 @ 07:45)  POCT Blood Glucose.: 151 mg/dL (09-06-19 @ 21:46)  POCT Blood Glucose.: 215 mg/dL (09-06-19 @ 17:07)  POCT Blood Glucose.: 204 mg/dL (09-06-19 @ 12:23)  POCT Blood Glucose.: 182 mg/dL (09-06-19 @ 07:58)  POCT Blood Glucose.: 159 mg/dL (09-05-19 @ 21:35)  POCT Blood Glucose.: 181 mg/dL (09-05-19 @ 17:22)                Hemoglobin A1C, Whole Blood: 9.4 % <H> [4.0 - 5.6] (08-14-19 @ 00:28)            Contact number: terry [phone] or [phone]

## 2019-09-08 NOTE — PROGRESS NOTE ADULT - I WAS PHYSICALLY PRESENT FOR THE KEY PORTIONS OF THE EVALUATION AND MANAGEMENT (E/M) SERVICE PROVIDED.  I AGREE WITH THE ABOVE HISTORY, PHYSICAL, AND PLAN WHICH I HAVE REVIEWED AND EDITED WHERE APPROPRIATE
Statement Selected
Oriented - self; Oriented - place; Oriented - time

## 2019-09-08 NOTE — PROGRESS NOTE ADULT - SUBJECTIVE AND OBJECTIVE BOX
Patient is a 68y old  Male who presents with a chief complaint of Back pain   Patient s/p T11-S1 Laminectomy, fusion 8/27/19, ALIF POD#5  Patient c/o pain in left buttock, radiating down back of left leg, constipation      T(C): 37.1 (09-08-19 @ 05:05), Max: 37.3 (09-08-19 @ 01:20)  HR: 85 (09-08-19 @ 05:05) (85 - 99)  BP: 145/87 (09-08-19 @ 05:05) (124/82 - 160/85)  RR: 18 (09-08-19 @ 05:05) (18 - 18)  SpO2: 96% (09-08-19 @ 05:05) (96% - 97%)    PHYSICAL EXAM:  NAD, Alert  Back: Dressing C/D/I; sensation grossly intact to light touch; (+) Distal Pulses; No Calf tenderness B/L, PAS              [ ] Lower extremeity                  PF          DF         EHL       FHL                                                                                            R        5/5        5/5        5/5       5/5                                                        L         5/5        5/5        5/5       5/5                          9.2    17.97 )-----------( 328      ( 06 Sep 2019 10:30 )             27.4

## 2019-09-08 NOTE — PROGRESS NOTE ADULT - ASSESSMENT
A/P: 68M s/p T11-S1 Lami/Fusion.    Abdominal discomfort:  AXR  Vascular Sx f/up noted.      S/p T11-S1 Lami/Fusion:S/p ALIF     Ortho spine f/up noted.  Pain control through Dilaudid IV and Oxy IR      DM II:  Lantus/Humalog/FSSS  Endo f/up noted.      HTN:  Cw lasix/Losartan    CKD IV:  BMP/Cr stable    Dw family

## 2019-09-09 LAB
GLUCOSE BLDC GLUCOMTR-MCNC: 180 MG/DL — HIGH (ref 70–99)
GLUCOSE BLDC GLUCOMTR-MCNC: 189 MG/DL — HIGH (ref 70–99)
GLUCOSE BLDC GLUCOMTR-MCNC: 196 MG/DL — HIGH (ref 70–99)
GLUCOSE BLDC GLUCOMTR-MCNC: 96 MG/DL — SIGNIFICANT CHANGE UP (ref 70–99)

## 2019-09-09 PROCEDURE — 99232 SBSQ HOSP IP/OBS MODERATE 35: CPT

## 2019-09-09 RX ADMIN — Medication 20 MILLIGRAM(S): at 05:07

## 2019-09-09 RX ADMIN — OXYCODONE HYDROCHLORIDE 10 MILLIGRAM(S): 5 TABLET ORAL at 10:10

## 2019-09-09 RX ADMIN — Medication 10 UNIT(S): at 17:18

## 2019-09-09 RX ADMIN — Medication 100 MILLIGRAM(S): at 22:03

## 2019-09-09 RX ADMIN — LACTULOSE 10 GRAM(S): 10 SOLUTION ORAL at 17:16

## 2019-09-09 RX ADMIN — OXYCODONE HYDROCHLORIDE 10 MILLIGRAM(S): 5 TABLET ORAL at 19:10

## 2019-09-09 RX ADMIN — OXYCODONE HYDROCHLORIDE 10 MILLIGRAM(S): 5 TABLET ORAL at 04:31

## 2019-09-09 RX ADMIN — INSULIN GLARGINE 26 UNIT(S): 100 INJECTION, SOLUTION SUBCUTANEOUS at 22:03

## 2019-09-09 RX ADMIN — Medication 100 MILLIGRAM(S): at 17:16

## 2019-09-09 RX ADMIN — TAMSULOSIN HYDROCHLORIDE 0.4 MILLIGRAM(S): 0.4 CAPSULE ORAL at 17:16

## 2019-09-09 RX ADMIN — OXYCODONE HYDROCHLORIDE 10 MILLIGRAM(S): 5 TABLET ORAL at 22:27

## 2019-09-09 RX ADMIN — LACTULOSE 10 GRAM(S): 10 SOLUTION ORAL at 13:29

## 2019-09-09 RX ADMIN — LOSARTAN POTASSIUM 25 MILLIGRAM(S): 100 TABLET, FILM COATED ORAL at 05:07

## 2019-09-09 RX ADMIN — Medication 100 MILLIGRAM(S): at 05:07

## 2019-09-09 RX ADMIN — OXYCODONE HYDROCHLORIDE 10 MILLIGRAM(S): 5 TABLET ORAL at 14:03

## 2019-09-09 RX ADMIN — Medication 1: at 16:39

## 2019-09-09 RX ADMIN — OXYCODONE HYDROCHLORIDE 10 MILLIGRAM(S): 5 TABLET ORAL at 09:42

## 2019-09-09 RX ADMIN — Medication 1: at 17:18

## 2019-09-09 RX ADMIN — SENNA PLUS 2 TABLET(S): 8.6 TABLET ORAL at 22:03

## 2019-09-09 RX ADMIN — Medication 2000 UNIT(S): at 13:29

## 2019-09-09 RX ADMIN — Medication 10 UNIT(S): at 16:38

## 2019-09-09 RX ADMIN — OXYCODONE HYDROCHLORIDE 10 MILLIGRAM(S): 5 TABLET ORAL at 22:57

## 2019-09-09 RX ADMIN — PANTOPRAZOLE SODIUM 40 MILLIGRAM(S): 20 TABLET, DELAYED RELEASE ORAL at 06:23

## 2019-09-09 RX ADMIN — FINASTERIDE 5 MILLIGRAM(S): 5 TABLET, FILM COATED ORAL at 13:29

## 2019-09-09 RX ADMIN — OXYCODONE HYDROCHLORIDE 10 MILLIGRAM(S): 5 TABLET ORAL at 14:30

## 2019-09-09 RX ADMIN — TAMSULOSIN HYDROCHLORIDE 0.4 MILLIGRAM(S): 0.4 CAPSULE ORAL at 05:07

## 2019-09-09 RX ADMIN — Medication 100 MILLIGRAM(S): at 13:29

## 2019-09-09 RX ADMIN — Medication 325 MILLIGRAM(S): at 13:29

## 2019-09-09 RX ADMIN — OXYCODONE HYDROCHLORIDE 10 MILLIGRAM(S): 5 TABLET ORAL at 05:00

## 2019-09-09 RX ADMIN — OXYCODONE HYDROCHLORIDE 10 MILLIGRAM(S): 5 TABLET ORAL at 18:36

## 2019-09-09 RX ADMIN — ATORVASTATIN CALCIUM 10 MILLIGRAM(S): 80 TABLET, FILM COATED ORAL at 22:03

## 2019-09-09 NOTE — PROGRESS NOTE ADULT - ASSESSMENT
A/P:  67yo M s/p L4-S1 ALIF with vascular exposure to the anterior lumbar spine with 2-3 days of abdominal fullness, symptoms improving with bowel movements and suppository   -Continue current bowel regimen and lactulose   -Suppository every 2 days is okay per vascular team  -Fleet enema is constipation persists after suppository   -Recommend to minimize narcotic analgesic use  -Patient can resume regular diet as tolerated

## 2019-09-09 NOTE — PROGRESS NOTE ADULT - SUBJECTIVE AND OBJECTIVE BOX
Surgery Progress Note     Subjective/24hour Events:   Patient seen and examined. Patient feeling less bloated and full, requesting regular diet. Patient has been passing gas and had multiple bowel movements last night after suppository. Denies nausea or vomiting. Pain controlled.     Vital Signs:  Vital Signs Last 24 Hrs  T(C): 36.8 (09 Sep 2019 08:10), Max: 37.1 (08 Sep 2019 15:40)  T(F): 98.2 (09 Sep 2019 08:10), Max: 98.8 (08 Sep 2019 15:40)  HR: 84 (09 Sep 2019 08:10) (77 - 98)  BP: 150/87 (09 Sep 2019 08:10) (117/84 - 151/85)  BP(mean): --  RR: 18 (09 Sep 2019 08:10) (18 - 18)  SpO2: 97% (09 Sep 2019 08:10) (95% - 98%)    CAPILLARY BLOOD GLUCOSE      POCT Blood Glucose.: 96 mg/dL (09 Sep 2019 09:33)  POCT Blood Glucose.: 99 mg/dL (08 Sep 2019 21:49)  POCT Blood Glucose.: 140 mg/dL (08 Sep 2019 18:05)  POCT Blood Glucose.: 209 mg/dL (08 Sep 2019 12:34)      I&O's Detail    08 Sep 2019 07:01  -  09 Sep 2019 07:00  --------------------------------------------------------  IN:    Oral Fluid: 740 mL    sodium chloride 0.9%.: 1050 mL  Total IN: 1790 mL    OUT:    Indwelling Catheter - Urethral: 3200 mL  Total OUT: 3200 mL    Total NET: -1410 mL          MEDICATIONS  (STANDING):  atorvastatin 10 milliGRAM(s) Oral at bedtime  cholecalciferol 2000 Unit(s) Oral daily  dextrose 5%. 1000 milliLiter(s) (50 mL/Hr) IV Continuous <Continuous>  dextrose 50% Injectable 12.5 Gram(s) IV Push once  dextrose 50% Injectable 25 Gram(s) IV Push once  dextrose 50% Injectable 25 Gram(s) IV Push once  docusate sodium 100 milliGRAM(s) Oral three times a day  ferrous    sulfate 325 milliGRAM(s) Oral daily  finasteride 5 milliGRAM(s) Oral daily  furosemide    Tablet 20 milliGRAM(s) Oral every 24 hours  insulin glargine Injectable (LANTUS) 26 Unit(s) SubCutaneous at bedtime  insulin lispro (HumaLOG) corrective regimen sliding scale   SubCutaneous three times a day before meals  insulin lispro (HumaLOG) corrective regimen sliding scale   SubCutaneous at bedtime  insulin lispro Injectable (HumaLOG) 10 Unit(s) SubCutaneous three times a day before meals  lactulose Syrup 10 Gram(s) Oral every 6 hours  losartan 25 milliGRAM(s) Oral daily  pantoprazole    Tablet 40 milliGRAM(s) Oral before breakfast  pregabalin 100 milliGRAM(s) Oral two times a day  senna 2 Tablet(s) Oral at bedtime  sodium chloride 0.9%. 1000 milliLiter(s) (75 mL/Hr) IV Continuous <Continuous>  tamsulosin 0.4 milliGRAM(s) Oral two times a day    MEDICATIONS  (PRN):  acetaminophen   Tablet .. 650 milliGRAM(s) Oral every 6 hours PRN Temp greater or equal to 38C (100.4F), Mild Pain (1 - 3)  bisacodyl Suppository 10 milliGRAM(s) Rectal daily PRN Constipation  cyclobenzaprine 5 milliGRAM(s) Oral three times a day PRN Muscle Spasm  dextrose 40% Gel 15 Gram(s) Oral once PRN Blood Glucose LESS THAN 70 milliGRAM(s)/deciLiter  diazepam    Tablet 5 milliGRAM(s) Oral every 12 hours PRN Anxiety  glucagon  Injectable 1 milliGRAM(s) IntraMuscular once PRN Glucose <70 milliGRAM(s)/deciLiter  guaiFENesin    Syrup 100 milliGRAM(s) Oral every 6 hours PRN congestion  HYDROmorphone  Injectable 0.5 milliGRAM(s) SubCutaneous every 6 hours PRN breakthrough pain  magnesium hydroxide Suspension 30 milliLiter(s) Oral daily PRN Constipation  ondansetron Injectable 4 milliGRAM(s) IV Push every 6 hours PRN Nausea  oxyCODONE    IR 5 milliGRAM(s) Oral every 4 hours PRN Moderate Pain (4 - 6)  oxyCODONE    IR 10 milliGRAM(s) Oral every 4 hours PRN Severe Pain (7 - 10)  polyethylene glycol 3350 17 Gram(s) Oral daily PRN Constipation  saline laxative (FLEET) Rectal Enema 1 Enema Rectal once PRN Persistent constipation  simethicone 80 milliGRAM(s) Chew two times a day PRN Gas  traMADol 50 milliGRAM(s) Oral every 4 hours PRN Mild Pain (1 - 3)      Physical Exam:  Constitutional: NAD, A&O x 3  Respiratory: non labored breathing   Gastrointestinal: abd soft, distension improved, mild tenderness around incision at left lower quadrant abd. incision with tegederm and 4x4's in place, CDI, no strikethrough bleeding  Vascular: + palpable femoral pulses b/l, palp DP/PT b/l      Imaging:  EXAM:  XR ABDOMEN PORTABLE URGENT 1V                        PROCEDURE DATE:  09/08/2019    INTERPRETATION:  CLINICAL INDICATION: Abdominal distension.  TECHNIQUE: AP view of the abdomen.   COMPARISON: Abdominal x-ray 8/22/2019  FINDINGS:  Partially imaged sternotomy wires and surgical clips overlying the left   hemithorax.  Surgical clips overlying the left hemiabdomen. Orthopedic hardware   consisting of bilateral rods and interlocking screws along the   thoracolumbar spine. Additional screws and linear densities are present   about the lower lumbar spine representing anterior lumbar interbody   fusion.  Air and stool within the loops of bowel and rectum. Nonobstructive bowel   gas pattern. Rectal tube present.  No acute displaced fracture.   IMPRESSION:   Nonobstructive bowel gas pattern.

## 2019-09-09 NOTE — PROGRESS NOTE ADULT - SUBJECTIVE AND OBJECTIVE BOX
Diabetes Follow up note: Saw pt earlier today  Interval Hx: 69 y/o M w/h/o uncontrolled  T2DM>on insulin basal/bolus PTA. DM c/b CAD> s/p CABG and  CKD3. H/o spinal stenosis s/p T10-L4 Laminectomy in 2016  now admitted fro revision of laminectomy and spinal fusion on 8/27 and requiring anterior lumbar interbody fusion 9/3/19. BG values coming down in the last 24 hours between 90s to low 100s. Pt reports tolerating POs but was constipated yesterday and placed NPO for abd x ray (negative for ileus) . Pt received meal time insulin last night which likely caused BG to come down to 90s. Pt was also NPO this am but starting to eat at lunch since he had 2 BMs since yesterday and feels hungry. On bowel regimen. No hypoglycemia.      Review of Systems:  General: On and off surgical pain. C/o LE numbness> primary team aware and following   GI: Tolerating POs without any N/V/D/ABD PAIN.  CV: No CP/SOB  ENDO: No S&Sx of hypoglycemia      MEDS:  atorvastatin 10 milliGRAM(s) Oral at bedtime  finasteride 5 milliGRAM(s) Oral daily  insulin glargine Injectable (LANTUS) 26 Unit(s) SubCutaneous at bedtime  insulin lispro (HumaLOG) corrective regimen sliding scale   SubCutaneous three times a day before meals  insulin lispro (HumaLOG) corrective regimen sliding scale   SubCutaneous at bedtime  insulin lispro Injectable (HumaLOG) 10 Unit(s) SubCutaneous three times a day before meals  cholecalciferol 2000 Unit(s) Oral daily  lactulose Syrup 10 Gram(s) Oral every 6 hours      Allergies    No Known Allergies        PE:  General: Male sitting in chair. NAD. Daughter at bedside  Vital Signs Last 24 Hrs  T(C): 36.9 (09-09-19 @ 17:03), Max: 36.9 (09-09-19 @ 01:00)  T(F): 98.5 (09-09-19 @ 17:03), Max: 98.5 (09-09-19 @ 17:03)  HR: 84 (09-09-19 @ 17:03) (77 - 92)  BP: 151/93 (09-09-19 @ 17:03) (117/84 - 151/93)  BP(mean): --  RR: 18 (09-09-19 @ 17:03) (18 - 18)  SpO2: 98% (09-09-19 @ 17:03) (95% - 98%)  Chest: brace in place.   Abd: Soft, NT, ND, central adiposity   Extremities: Warm. no edema x 4 ext.   Neuro: A&O X3. Tunisian speaking. Encounter in [place]ian. Pt also speaks English    LABS:  POCT Blood Glucose.: 196 mg/dL (09-09-19 @ 17:12)  POCT Blood Glucose.: 180 mg/dL (09-09-19 @ 15:54)  POCT Blood Glucose.: 96 mg/dL (09-09-19 @ 09:33)  POCT Blood Glucose.: 99 mg/dL (09-08-19 @ 21:49)  POCT Blood Glucose.: 140 mg/dL (09-08-19 @ 18:05) NPO  POCT Blood Glucose.: 209 mg/dL (09-08-19 @ 12:34)  POCT Blood Glucose.: 146 mg/dL (09-08-19 @ 08:29)  POCT Blood Glucose.: 205 mg/dL (09-07-19 @ 21:58)  POCT Blood Glucose.: 238 mg/dL (09-07-19 @ 18:14)                         9.2    17.97 )-----------( 328      ( 06 Sep 2019 10:30 )             27.4     09-06    134<L>  |  99  |  34<H>  ----------------------------<  179<H>  3.8   |  21<L>  |  1.58<H>    Ca    8.8      06 Sep 2019 07:01      Hemoglobin A1C, Whole Blood: 9.4 % <H> [4.0 - 5.6] (08-14-19 @ 00:28)

## 2019-09-09 NOTE — PROGRESS NOTE ADULT - SUBJECTIVE AND OBJECTIVE BOX
No acute events overnight. Pain well controlled with pain medications.    Vital Signs Last 24 Hrs  T(C): 36.9 (09 Sep 2019 01:00), Max: 37.1 (08 Sep 2019 15:40)  T(F): 98.4 (09 Sep 2019 01:00), Max: 98.8 (08 Sep 2019 15:40)  HR: 88 (09 Sep 2019 01:00) (84 - 98)  BP: 121/71 (09 Sep 2019 01:00) (119/71 - 151/85)  BP(mean): --  RR: 18 (09 Sep 2019 01:00) (18 - 18)  SpO2: 95% (09 Sep 2019 01:00) (95% - 98%)    Exam:  Gen: NAD  Motor: 5/5 EHL/FHL/TA/Gastrocnemius  Sensory: SILT DP/SP/S/S/T nerve distributions  Dressing (Anterior and Posterior): Clean, Dry, Intact    A/P: 68 year old male s/p T11-S1 PSF followed by ALIF  - Pain Control  - PT/OT, OOB  - NPO, IVF, Feleet Enema PRN per Vascular for abdominal distension  - Follow up Vascular  - Discharge Planning

## 2019-09-09 NOTE — PROGRESS NOTE ADULT - ASSESSMENT
A/P: 68M s/p T11-S1 Lami/Fusion.    Abdominal discomfort:  AXR: Nonobst disease  Vascular Sx f/up noted.      S/p T11-S1 Lami/Fusion:S/p ALIF     Ortho spine f/up noted.  Pain control through Dilaudid IV and Oxy IR      DM II:  Lantus/Humalog/FSSS  Endo f/up noted.      HTN:  Cw lasix/Losartan    CKD IV:  BMP/Cr stable    Dw family

## 2019-09-09 NOTE — PROGRESS NOTE ADULT - SUBJECTIVE AND OBJECTIVE BOX
Patient is a 68y old  Male who presents with a chief complaint of Chronic back pain (08 Sep 2019 07:16)      SUBJECTIVE / OVERNIGHT EVENTS:    Events noted.  CONSTITUTIONAL: No fever,  or fatigue  RESPIRATORY: No cough, wheezing,  No shortness of breath  CARDIOVASCULAR: No chest pain, palpitations, dizziness, or leg swelling  GASTROINTESTINAL: No abdominal or epigastric pain. No nausea, vomiting.  NEUROLOGICAL: No headaches,     MEDICATIONS  (STANDING):  atorvastatin 10 milliGRAM(s) Oral at bedtime  cholecalciferol 2000 Unit(s) Oral daily  dextrose 5%. 1000 milliLiter(s) (50 mL/Hr) IV Continuous <Continuous>  dextrose 50% Injectable 12.5 Gram(s) IV Push once  dextrose 50% Injectable 25 Gram(s) IV Push once  dextrose 50% Injectable 25 Gram(s) IV Push once  docusate sodium 100 milliGRAM(s) Oral three times a day  ferrous    sulfate 325 milliGRAM(s) Oral daily  finasteride 5 milliGRAM(s) Oral daily  furosemide    Tablet 20 milliGRAM(s) Oral every 24 hours  insulin glargine Injectable (LANTUS) 26 Unit(s) SubCutaneous at bedtime  insulin lispro (HumaLOG) corrective regimen sliding scale   SubCutaneous three times a day before meals  insulin lispro (HumaLOG) corrective regimen sliding scale   SubCutaneous at bedtime  insulin lispro Injectable (HumaLOG) 10 Unit(s) SubCutaneous three times a day before meals  lactulose Syrup 10 Gram(s) Oral every 6 hours  losartan 25 milliGRAM(s) Oral daily  pantoprazole    Tablet 40 milliGRAM(s) Oral before breakfast  pregabalin 100 milliGRAM(s) Oral two times a day  senna 2 Tablet(s) Oral at bedtime  sodium chloride 0.9%. 1000 milliLiter(s) (75 mL/Hr) IV Continuous <Continuous>  tamsulosin 0.4 milliGRAM(s) Oral two times a day    MEDICATIONS  (PRN):  acetaminophen   Tablet .. 650 milliGRAM(s) Oral every 6 hours PRN Temp greater or equal to 38C (100.4F), Mild Pain (1 - 3)  bisacodyl Suppository 10 milliGRAM(s) Rectal daily PRN Constipation  cyclobenzaprine 5 milliGRAM(s) Oral three times a day PRN Muscle Spasm  dextrose 40% Gel 15 Gram(s) Oral once PRN Blood Glucose LESS THAN 70 milliGRAM(s)/deciLiter  diazepam    Tablet 5 milliGRAM(s) Oral every 12 hours PRN Anxiety  glucagon  Injectable 1 milliGRAM(s) IntraMuscular once PRN Glucose <70 milliGRAM(s)/deciLiter  guaiFENesin    Syrup 100 milliGRAM(s) Oral every 6 hours PRN congestion  HYDROmorphone  Injectable 0.5 milliGRAM(s) SubCutaneous every 6 hours PRN breakthrough pain  magnesium hydroxide Suspension 30 milliLiter(s) Oral daily PRN Constipation  ondansetron Injectable 4 milliGRAM(s) IV Push every 6 hours PRN Nausea  oxyCODONE    IR 5 milliGRAM(s) Oral every 4 hours PRN Moderate Pain (4 - 6)  oxyCODONE    IR 10 milliGRAM(s) Oral every 4 hours PRN Severe Pain (7 - 10)  polyethylene glycol 3350 17 Gram(s) Oral daily PRN Constipation  saline laxative (FLEET) Rectal Enema 1 Enema Rectal once PRN Persistent constipation  simethicone 80 milliGRAM(s) Chew two times a day PRN Gas  traMADol 50 milliGRAM(s) Oral every 4 hours PRN Mild Pain (1 - 3)        CAPILLARY BLOOD GLUCOSE      POCT Blood Glucose.: 196 mg/dL (09 Sep 2019 17:12)  POCT Blood Glucose.: 180 mg/dL (09 Sep 2019 15:54)  POCT Blood Glucose.: 96 mg/dL (09 Sep 2019 09:33)  POCT Blood Glucose.: 99 mg/dL (08 Sep 2019 21:49)    I&O's Summary    08 Sep 2019 07:01  -  09 Sep 2019 07:00  --------------------------------------------------------  IN: 1790 mL / OUT: 3200 mL / NET: -1410 mL    09 Sep 2019 07:01  -  09 Sep 2019 21:02  --------------------------------------------------------  IN: 440 mL / OUT: 1000 mL / NET: -560 mL        PHYSICAL EXAM:  GENERAL: NAD  NECK: Supple, No JVD  CHEST/LUNG: Clear to auscultation bilaterally; No wheezing.  HEART: Regular rate and rhythm; No murmurs, rubs, or gallops  ABDOMEN: Soft, Nontender, Nondistended; Bowel sounds present  EXTREMITIES:   No edema  NEUROLOGY: AAO X 3      LABS:                  CAPILLARY BLOOD GLUCOSE      POCT Blood Glucose.: 196 mg/dL (09 Sep 2019 17:12)  POCT Blood Glucose.: 180 mg/dL (09 Sep 2019 15:54)  POCT Blood Glucose.: 96 mg/dL (09 Sep 2019 09:33)  POCT Blood Glucose.: 99 mg/dL (08 Sep 2019 21:49)                RADIOLOGY & ADDITIONAL TESTS:    Imaging Personally Reviewed:    Consultant(s) Notes Reviewed:      Care Discussed with Consultants/Other Providers:

## 2019-09-09 NOTE — PROGRESS NOTE ADULT - PROBLEM SELECTOR PLAN 1
-test BG AC/HS  -c/w Lantus 26 units QHS  -c/w Humalog 10 units AC meals  -c/w Humalog low correction scale AC and Low HS scale  -Discharge plan: basal/bolus  -Plan discussed with pt/team.  Contact info: [phone] (24/7). pager [phone]

## 2019-09-09 NOTE — PROGRESS NOTE ADULT - ASSESSMENT
67 y/o M w/h/o uncontrolled  T2DM>on insulin basal/bolus PTA. DM c/b CAD> s/p CABG and  CKD3. H/o spinal stenosis s/p T10-L4 Laminectomy in 2016  now admitted fro revision of laminectomy  and spinal fusion on 8/27 and now requiring anterior lumbar interbody fusion 9/3/19. Post op c/b constipation > on bowel regimen with positive BMs. Pt NPO since dinner time yesterday and this am with BG droping to <100s. However, BG now going up. Will wait until tomorrow and re assess for further insulin adjustments. No hypoglycemia and pt  tolerating POs. BG goal (100-180mg/dl).

## 2019-09-10 LAB
GLUCOSE BLDC GLUCOMTR-MCNC: 124 MG/DL — HIGH (ref 70–99)
GLUCOSE BLDC GLUCOMTR-MCNC: 128 MG/DL — HIGH (ref 70–99)
GLUCOSE BLDC GLUCOMTR-MCNC: 161 MG/DL — HIGH (ref 70–99)
GLUCOSE BLDC GLUCOMTR-MCNC: 171 MG/DL — HIGH (ref 70–99)

## 2019-09-10 PROCEDURE — 99232 SBSQ HOSP IP/OBS MODERATE 35: CPT

## 2019-09-10 RX ORDER — OXYCODONE HYDROCHLORIDE 5 MG/1
10 TABLET ORAL EVERY 4 HOURS
Refills: 0 | Status: DISCONTINUED | OUTPATIENT
Start: 2019-09-10 | End: 2019-09-11

## 2019-09-10 RX ADMIN — LACTULOSE 10 GRAM(S): 10 SOLUTION ORAL at 06:05

## 2019-09-10 RX ADMIN — OXYCODONE HYDROCHLORIDE 10 MILLIGRAM(S): 5 TABLET ORAL at 03:30

## 2019-09-10 RX ADMIN — Medication 100 MILLIGRAM(S): at 22:15

## 2019-09-10 RX ADMIN — OXYCODONE HYDROCHLORIDE 10 MILLIGRAM(S): 5 TABLET ORAL at 20:18

## 2019-09-10 RX ADMIN — Medication 1 ENEMA: at 15:30

## 2019-09-10 RX ADMIN — TAMSULOSIN HYDROCHLORIDE 0.4 MILLIGRAM(S): 0.4 CAPSULE ORAL at 18:15

## 2019-09-10 RX ADMIN — OXYCODONE HYDROCHLORIDE 10 MILLIGRAM(S): 5 TABLET ORAL at 10:47

## 2019-09-10 RX ADMIN — PANTOPRAZOLE SODIUM 40 MILLIGRAM(S): 20 TABLET, DELAYED RELEASE ORAL at 06:05

## 2019-09-10 RX ADMIN — HYDROMORPHONE HYDROCHLORIDE 0.5 MILLIGRAM(S): 2 INJECTION INTRAMUSCULAR; INTRAVENOUS; SUBCUTANEOUS at 06:05

## 2019-09-10 RX ADMIN — LACTULOSE 10 GRAM(S): 10 SOLUTION ORAL at 12:46

## 2019-09-10 RX ADMIN — Medication 325 MILLIGRAM(S): at 12:47

## 2019-09-10 RX ADMIN — POLYETHYLENE GLYCOL 3350 17 GRAM(S): 17 POWDER, FOR SOLUTION ORAL at 06:04

## 2019-09-10 RX ADMIN — Medication 5 MILLIGRAM(S): at 22:15

## 2019-09-10 RX ADMIN — Medication 100 MILLIGRAM(S): at 06:05

## 2019-09-10 RX ADMIN — OXYCODONE HYDROCHLORIDE 10 MILLIGRAM(S): 5 TABLET ORAL at 02:59

## 2019-09-10 RX ADMIN — Medication 10 UNIT(S): at 13:21

## 2019-09-10 RX ADMIN — TAMSULOSIN HYDROCHLORIDE 0.4 MILLIGRAM(S): 0.4 CAPSULE ORAL at 06:05

## 2019-09-10 RX ADMIN — OXYCODONE HYDROCHLORIDE 10 MILLIGRAM(S): 5 TABLET ORAL at 09:47

## 2019-09-10 RX ADMIN — OXYCODONE HYDROCHLORIDE 10 MILLIGRAM(S): 5 TABLET ORAL at 20:50

## 2019-09-10 RX ADMIN — Medication 2000 UNIT(S): at 12:47

## 2019-09-10 RX ADMIN — Medication 100 MILLIGRAM(S): at 18:15

## 2019-09-10 RX ADMIN — SENNA PLUS 2 TABLET(S): 8.6 TABLET ORAL at 22:15

## 2019-09-10 RX ADMIN — FINASTERIDE 5 MILLIGRAM(S): 5 TABLET, FILM COATED ORAL at 12:47

## 2019-09-10 RX ADMIN — Medication 10 UNIT(S): at 18:25

## 2019-09-10 RX ADMIN — Medication 1: at 18:25

## 2019-09-10 RX ADMIN — Medication 100 MILLIGRAM(S): at 13:20

## 2019-09-10 RX ADMIN — LACTULOSE 10 GRAM(S): 10 SOLUTION ORAL at 18:16

## 2019-09-10 RX ADMIN — LOSARTAN POTASSIUM 25 MILLIGRAM(S): 100 TABLET, FILM COATED ORAL at 06:05

## 2019-09-10 RX ADMIN — Medication 10 UNIT(S): at 09:48

## 2019-09-10 RX ADMIN — ATORVASTATIN CALCIUM 10 MILLIGRAM(S): 80 TABLET, FILM COATED ORAL at 22:15

## 2019-09-10 RX ADMIN — Medication 20 MILLIGRAM(S): at 06:05

## 2019-09-10 RX ADMIN — Medication 100 MILLIGRAM(S): at 06:04

## 2019-09-10 RX ADMIN — INSULIN GLARGINE 26 UNIT(S): 100 INJECTION, SOLUTION SUBCUTANEOUS at 22:15

## 2019-09-10 RX ADMIN — HYDROMORPHONE HYDROCHLORIDE 0.5 MILLIGRAM(S): 2 INJECTION INTRAMUSCULAR; INTRAVENOUS; SUBCUTANEOUS at 06:35

## 2019-09-10 NOTE — PROGRESS NOTE ADULT - ASSESSMENT
A/P: 69 y/o M POD# 7 s/p T11-S1 ALIF; s/p T11-S1 Lami/Fusion 8/27    DVT ppx - Venodynes  WBAT  Pain management prn  Peters to gravity  Diabetic diet as tolerated  Appreciate GI Note  Discharge planning to Acute Rehab      IMMANUEL Eric  Orthopedic Surgery  [phone]

## 2019-09-10 NOTE — CHART NOTE - NSCHARTNOTEFT_GEN_A_CORE
Patient stable and okay to discharge from a vascular surgery standpoint. Please do not hesitate to contact us if any questions arise, [phone]

## 2019-09-10 NOTE — PROGRESS NOTE ADULT - SUBJECTIVE AND OBJECTIVE BOX
Diabetes Follow up note: Saw pt earlier today  Interval Hx: 67 y/o M w/h/o uncontrolled  T2DM>on insulin basal/bolus PTA. DM c/b CAD> s/p CABG and  CKD3. H/o spinal stenosis s/p T10-L4 Laminectomy in 2016  now admitted fro revision of laminectomy and spinal fusion on 8/27 and requiring anterior lumbar interbody fusion 9/3/19. BG values coming down in the last 24 hours between 90s to low 100s. Pt reports tolerating POs with glycemic control mostly at goal while on present insulin regimen.  No hypoglycemia. On bowel regimen. Pt had BMs yesterday but is concerned he will get constipated again.     Review of Systems:  General: On and off surgical pain. LE numbness improving slowly.   GI: Tolerating POs without any N/V/D/ABD PAIN.  CV: No CP/SOB  ENDO: No S&Sx of hypoglycemia      MEDS:  atorvastatin 10 milliGRAM(s) Oral at bedtime  finasteride 5 milliGRAM(s) Oral daily  insulin glargine Injectable (LANTUS) 26 Unit(s) SubCutaneous at bedtime  insulin lispro (HumaLOG) corrective regimen sliding scale   SubCutaneous three times a day before meals  insulin lispro (HumaLOG) corrective regimen sliding scale   SubCutaneous at bedtime  insulin lispro Injectable (HumaLOG) 10 Unit(s) SubCutaneous three times a day before meals  cholecalciferol 2000 Unit(s) Oral daily  lactulose Syrup 10 Gram(s) Oral every 6 hours      Allergies    No Known Allergies        PE:  General: Male sitting in chair. NAD.  Vital Signs Last 24 Hrs  T(C): 36.8 (09-10-19 @ 13:45), Max: 37.1 (09-09-19 @ 20:00)  T(F): 98.2 (09-10-19 @ 13:45), Max: 98.7 (09-09-19 @ 20:00)  HR: 105 (09-10-19 @ 13:45) (84 - 105)  BP: 148/86 (09-10-19 @ 13:45) (134/78 - 154/85)  BP(mean): --  RR: 18 (09-10-19 @ 13:45) (17 - 18)  SpO2: 98% (09-10-19 @ 13:45) (96% - 100%)  Chest: brace in place.   Abd: Soft, NT, ND, central adiposity, Abdominal dressing D&I   Extremities: Warm. no edema x 4 ext.   Neuro: A&O X3. [place] speaking. Encounter in [place]. Pt also speaks English    LABS:  POCT Blood Glucose.: 128 mg/dL (09-10-19 @ 12:48)  POCT Blood Glucose.: 124 mg/dL (09-10-19 @ 08:30)  POCT Blood Glucose.: 189 mg/dL (09-09-19 @ 21:17)  POCT Blood Glucose.: 196 mg/dL (09-09-19 @ 17:12)  POCT Blood Glucose.: 180 mg/dL (09-09-19 @ 15:54)  POCT Blood Glucose.: 96 mg/dL (09-09-19 @ 09:33)  POCT Blood Glucose.: 99 mg/dL (09-08-19 @ 21:49)  POCT Blood Glucose.: 140 mg/dL (09-08-19 @ 18:05)                           9.2    17.97 )-----------( 328      ( 06 Sep 2019 10:30 )             27.4     09-06    134<L>  |  99  |  34<H>  ----------------------------<  179<H>  3.8   |  21<L>  |  1.58<H>    Ca    8.8      06 Sep 2019 07:01      Hemoglobin A1C, Whole Blood: 9.4 % <H> [4.0 - 5.6] (08-14-19 @ 00:28)

## 2019-09-10 NOTE — PROGRESS NOTE ADULT - ASSESSMENT
A/P: 68M s/p T11-S1 Lami/Fusion.        S/p T11-S1 Lami/Fusion:S/p ALIF     Ortho spine f/up noted.  Pain control through Dilaudid IV and Oxy IR      DM II:  Lantus/Humalog/FSSS  Endo f/up noted.      HTN:  Cw lasix/Losartan    CKD IV:  BMP/Cr stable    Dw family

## 2019-09-10 NOTE — PROGRESS NOTE ADULT - SUBJECTIVE AND OBJECTIVE BOX
Patient is a 68y old  Male who presents with a chief complaint of Chronic back pain (08 Sep 2019 07:16)      SUBJECTIVE / OVERNIGHT EVENTS:    Events noted.  CONSTITUTIONAL: No fever,  or fatigue  RESPIRATORY: No cough, wheezing,  No shortness of breath  CARDIOVASCULAR: No chest pain, palpitations, dizziness, or leg swelling  GASTROINTESTINAL: No abdominal or epigastric pain.   NEUROLOGICAL: No headaches,     MEDICATIONS  (STANDING):  atorvastatin 10 milliGRAM(s) Oral at bedtime  cholecalciferol 2000 Unit(s) Oral daily  dextrose 5%. 1000 milliLiter(s) (50 mL/Hr) IV Continuous <Continuous>  dextrose 50% Injectable 12.5 Gram(s) IV Push once  dextrose 50% Injectable 25 Gram(s) IV Push once  dextrose 50% Injectable 25 Gram(s) IV Push once  docusate sodium 100 milliGRAM(s) Oral three times a day  ferrous    sulfate 325 milliGRAM(s) Oral daily  finasteride 5 milliGRAM(s) Oral daily  furosemide    Tablet 20 milliGRAM(s) Oral every 24 hours  insulin glargine Injectable (LANTUS) 26 Unit(s) SubCutaneous at bedtime  insulin lispro (HumaLOG) corrective regimen sliding scale   SubCutaneous three times a day before meals  insulin lispro (HumaLOG) corrective regimen sliding scale   SubCutaneous at bedtime  insulin lispro Injectable (HumaLOG) 10 Unit(s) SubCutaneous three times a day before meals  lactulose Syrup 10 Gram(s) Oral every 6 hours  losartan 25 milliGRAM(s) Oral daily  pantoprazole    Tablet 40 milliGRAM(s) Oral before breakfast  pregabalin 100 milliGRAM(s) Oral two times a day  senna 2 Tablet(s) Oral at bedtime  sodium chloride 0.9%. 1000 milliLiter(s) (75 mL/Hr) IV Continuous <Continuous>  tamsulosin 0.4 milliGRAM(s) Oral two times a day    MEDICATIONS  (PRN):  acetaminophen   Tablet .. 650 milliGRAM(s) Oral every 6 hours PRN Temp greater or equal to 38C (100.4F), Mild Pain (1 - 3)  bisacodyl Suppository 10 milliGRAM(s) Rectal daily PRN Constipation  cyclobenzaprine 5 milliGRAM(s) Oral three times a day PRN Muscle Spasm  dextrose 40% Gel 15 Gram(s) Oral once PRN Blood Glucose LESS THAN 70 milliGRAM(s)/deciLiter  diazepam    Tablet 5 milliGRAM(s) Oral every 12 hours PRN Anxiety  glucagon  Injectable 1 milliGRAM(s) IntraMuscular once PRN Glucose <70 milliGRAM(s)/deciLiter  guaiFENesin    Syrup 100 milliGRAM(s) Oral every 6 hours PRN congestion  HYDROmorphone  Injectable 0.5 milliGRAM(s) SubCutaneous every 6 hours PRN breakthrough pain  magnesium hydroxide Suspension 30 milliLiter(s) Oral daily PRN Constipation  ondansetron Injectable 4 milliGRAM(s) IV Push every 6 hours PRN Nausea  oxyCODONE    IR 5 milliGRAM(s) Oral every 4 hours PRN Moderate Pain (4 - 6)  oxyCODONE    IR 10 milliGRAM(s) Oral every 4 hours PRN Severe Pain (7 - 10)  polyethylene glycol 3350 17 Gram(s) Oral daily PRN Constipation  saline laxative (FLEET) Rectal Enema 1 Enema Rectal once PRN Persistent constipation  simethicone 80 milliGRAM(s) Chew two times a day PRN Gas  traMADol 50 milliGRAM(s) Oral every 4 hours PRN Mild Pain (1 - 3)        CAPILLARY BLOOD GLUCOSE      POCT Blood Glucose.: 196 mg/dL (09 Sep 2019 17:12)  POCT Blood Glucose.: 180 mg/dL (09 Sep 2019 15:54)  POCT Blood Glucose.: 96 mg/dL (09 Sep 2019 09:33)  POCT Blood Glucose.: 99 mg/dL (08 Sep 2019 21:49)    I&O's Summary    08 Sep 2019 07:01  -  09 Sep 2019 07:00  --------------------------------------------------------  IN: 1790 mL / OUT: 3200 mL / NET: -1410 mL    09 Sep 2019 07:01  -  09 Sep 2019 21:02  --------------------------------------------------------  IN: 440 mL / OUT: 1000 mL / NET: -560 mL        PHYSICAL EXAM:    NECK: Supple, No JVD  CHEST/LUNG: Clear to auscultation bilaterally; No wheezing.  HEART: Regular rate and rhythm; No murmurs, rubs, or gallops  ABDOMEN: Soft, Nontender, Nondistended; Bowel sounds present  EXTREMITIES:   No edema  NEUROLOGY: AAO      LABS:                  CAPILLARY BLOOD GLUCOSE      POCT Blood Glucose.: 196 mg/dL (09 Sep 2019 17:12)  POCT Blood Glucose.: 180 mg/dL (09 Sep 2019 15:54)  POCT Blood Glucose.: 96 mg/dL (09 Sep 2019 09:33)  POCT Blood Glucose.: 99 mg/dL (08 Sep 2019 21:49)                RADIOLOGY & ADDITIONAL TESTS:    Imaging Personally Reviewed:    Consultant(s) Notes Reviewed:      Care Discussed with Consultants/Other Providers:

## 2019-09-10 NOTE — PROGRESS NOTE ADULT - SUBJECTIVE AND OBJECTIVE BOX
Patient resting. Reports was OOB to chair yesterday. Pain controlled.  Tolerated diet last night. +Flatus "a little."  No chest pain, SOB, abdominal pain, N/V.    T(C): 36.6 (09-10-19 @ 04:36), Max: 37.1 (09-09-19 @ 20:00)  HR: 85 (09-10-19 @ 04:36) (84 - 93)  BP: 134/78 (09-10-19 @ 04:36) (132/79 - 154/85)  RR: 18 (09-10-19 @ 04:36) (18 - 18)  SpO2: 100% (09-10-19 @ 04:36) (97% - 100%)      Exam:  Alert and Oriented, No Acute Distress, [place] speaking  Abd: Distended, soft, +BS, Dressing C/D/I, not rigid  Lower Extremities:  Calves Soft, Non-tender bilaterally  +PF/DF/EHL/FHL  SILT  +DP Pulse    Xray: Patient resting. Reports was OOB to chair yesterday. Pain controlled.  Tolerated diet last night. +Flatus "a little."  No chest pain, SOB, abdominal pain, N/V.    T(C): 36.6 (09-10-19 @ 04:36), Max: 37.1 (09-09-19 @ 20:00)  HR: 85 (09-10-19 @ 04:36) (84 - 93)  BP: 134/78 (09-10-19 @ 04:36) (132/79 - 154/85)  RR: 18 (09-10-19 @ 04:36) (18 - 18)  SpO2: 100% (09-10-19 @ 04:36) (97% - 100%)      Exam:  Alert and Oriented, No Acute Distress, Somali speaking  Abd: Distended, soft, +BS, Dressing C/D/I, not rigid  Lower Extremities:  Motor 5/5x4, Dulls sensation intact X4  Calves Soft, Non-tender bilaterally  : +Peters in place

## 2019-09-10 NOTE — PROGRESS NOTE ADULT - PROBLEM SELECTOR PROBLEM 1
Spinal stenosis of lumbar region at multiple levels
Type 2 diabetes mellitus with hyperglycemia, with long-term current use of insulin

## 2019-09-10 NOTE — PROGRESS NOTE ADULT - ASSESSMENT
69 y/o M w/h/o uncontrolled  T2DM>on insulin basal/bolus PTA. DM c/b CAD> s/p CABG and  CKD3. H/o spinal stenosis s/p T10-L4 Laminectomy in 2016  now admitted fro revision of laminectomy  and spinal fusion on 8/27 and now requiring anterior lumbar interbody fusion 9/3/19. Post op c/b constipation > on bowel regimen with positive BMs. Pt tolerating POs with BG mostly at goal while on present insulin regimen. No hypoglycemia.  BG goal (100-180mg/dl).

## 2019-09-10 NOTE — PROGRESS NOTE ADULT - PROBLEM SELECTOR PLAN 1
-test BG AC/HS  -c/w Lantus 26 units QHS  -c/w Humalog 10 units AC meals  -c/w Humalog low correction scale AC and Low HS scale  -Discharge plan: basal/bolus. Pt on Basaglar and Humalog at home  -Plan discussed with pt/team.  Contact info: [phone] (24/7). pager [phone]

## 2019-09-11 ENCOUNTER — INBOUND DOCUMENT (OUTPATIENT)
Age: 69
End: 2019-09-11

## 2019-09-11 ENCOUNTER — TRANSCRIPTION ENCOUNTER (OUTPATIENT)
Age: 69
End: 2019-09-11

## 2019-09-11 VITALS
DIASTOLIC BLOOD PRESSURE: 70 MMHG | TEMPERATURE: 98 F | SYSTOLIC BLOOD PRESSURE: 124 MMHG | RESPIRATION RATE: 18 BRPM | OXYGEN SATURATION: 96 % | HEART RATE: 94 BPM

## 2019-09-11 LAB
GLUCOSE BLDC GLUCOMTR-MCNC: 110 MG/DL — HIGH (ref 70–99)
GLUCOSE BLDC GLUCOMTR-MCNC: 139 MG/DL — HIGH (ref 70–99)
GLUCOSE BLDC GLUCOMTR-MCNC: 140 MG/DL — HIGH (ref 70–99)

## 2019-09-11 RX ORDER — FERROUS SULFATE 325(65) MG
0 TABLET ORAL
Qty: 0 | Refills: 0 | DISCHARGE
Start: 2019-09-11

## 2019-09-11 RX ORDER — PANTOPRAZOLE SODIUM 20 MG/1
1 TABLET, DELAYED RELEASE ORAL
Qty: 0 | Refills: 0 | DISCHARGE

## 2019-09-11 RX ORDER — INSULIN GLARGINE 100 [IU]/ML
26 INJECTION, SOLUTION SUBCUTANEOUS
Qty: 0 | Refills: 0 | DISCHARGE
Start: 2019-09-11

## 2019-09-11 RX ORDER — CHOLECALCIFEROL (VITAMIN D3) 125 MCG
1 CAPSULE ORAL
Qty: 0 | Refills: 0 | DISCHARGE

## 2019-09-11 RX ORDER — ACETAMINOPHEN 500 MG
2 TABLET ORAL
Qty: 0 | Refills: 0 | DISCHARGE
Start: 2019-09-11

## 2019-09-11 RX ORDER — OXYCODONE HYDROCHLORIDE 5 MG/1
1 TABLET ORAL
Qty: 0 | Refills: 0 | DISCHARGE
Start: 2019-09-11

## 2019-09-11 RX ORDER — FUROSEMIDE 40 MG
1 TABLET ORAL
Qty: 0 | Refills: 0 | DISCHARGE

## 2019-09-11 RX ORDER — SENNA PLUS 8.6 MG/1
2 TABLET ORAL
Qty: 0 | Refills: 0 | DISCHARGE
Start: 2019-09-11

## 2019-09-11 RX ORDER — FERROUS SULFATE 325(65) MG
1 TABLET ORAL
Qty: 0 | Refills: 0 | DISCHARGE

## 2019-09-11 RX ORDER — ATORVASTATIN CALCIUM 80 MG/1
1 TABLET, FILM COATED ORAL
Qty: 0 | Refills: 0 | DISCHARGE
Start: 2019-09-11

## 2019-09-11 RX ORDER — INSULIN GLARGINE 100 [IU]/ML
40 INJECTION, SOLUTION SUBCUTANEOUS
Qty: 0 | Refills: 0 | DISCHARGE

## 2019-09-11 RX ORDER — DIAZEPAM 5 MG
5 TABLET ORAL EVERY 12 HOURS
Refills: 0 | Status: DISCONTINUED | OUTPATIENT
Start: 2019-09-11 | End: 2019-09-11

## 2019-09-11 RX ORDER — ATORVASTATIN CALCIUM 80 MG/1
1 TABLET, FILM COATED ORAL
Qty: 0 | Refills: 0 | DISCHARGE

## 2019-09-11 RX ORDER — CHOLECALCIFEROL (VITAMIN D3) 125 MCG
2000 CAPSULE ORAL
Qty: 0 | Refills: 0 | DISCHARGE
Start: 2019-09-11

## 2019-09-11 RX ORDER — PANTOPRAZOLE SODIUM 20 MG/1
1 TABLET, DELAYED RELEASE ORAL
Qty: 0 | Refills: 0 | DISCHARGE
Start: 2019-09-11

## 2019-09-11 RX ORDER — DOCUSATE SODIUM 100 MG
1 CAPSULE ORAL
Qty: 0 | Refills: 0 | DISCHARGE
Start: 2019-09-11

## 2019-09-11 RX ORDER — SIMETHICONE 80 MG/1
1 TABLET, CHEWABLE ORAL
Qty: 0 | Refills: 0 | DISCHARGE
Start: 2019-09-11

## 2019-09-11 RX ORDER — CYCLOBENZAPRINE HYDROCHLORIDE 10 MG/1
1 TABLET, FILM COATED ORAL
Qty: 0 | Refills: 0 | DISCHARGE
Start: 2019-09-11

## 2019-09-11 RX ORDER — POLYETHYLENE GLYCOL 3350 17 G/17G
17 POWDER, FOR SOLUTION ORAL
Qty: 0 | Refills: 0 | DISCHARGE
Start: 2019-09-11

## 2019-09-11 RX ORDER — FUROSEMIDE 40 MG
1 TABLET ORAL
Qty: 0 | Refills: 0 | DISCHARGE
Start: 2019-09-11

## 2019-09-11 RX ADMIN — OXYCODONE HYDROCHLORIDE 10 MILLIGRAM(S): 5 TABLET ORAL at 18:29

## 2019-09-11 RX ADMIN — TAMSULOSIN HYDROCHLORIDE 0.4 MILLIGRAM(S): 0.4 CAPSULE ORAL at 17:49

## 2019-09-11 RX ADMIN — LACTULOSE 10 GRAM(S): 10 SOLUTION ORAL at 05:07

## 2019-09-11 RX ADMIN — LOSARTAN POTASSIUM 25 MILLIGRAM(S): 100 TABLET, FILM COATED ORAL at 05:07

## 2019-09-11 RX ADMIN — CYCLOBENZAPRINE HYDROCHLORIDE 5 MILLIGRAM(S): 10 TABLET, FILM COATED ORAL at 12:51

## 2019-09-11 RX ADMIN — FINASTERIDE 5 MILLIGRAM(S): 5 TABLET, FILM COATED ORAL at 11:59

## 2019-09-11 RX ADMIN — Medication 20 MILLIGRAM(S): at 05:07

## 2019-09-11 RX ADMIN — OXYCODONE HYDROCHLORIDE 10 MILLIGRAM(S): 5 TABLET ORAL at 05:37

## 2019-09-11 RX ADMIN — Medication 325 MILLIGRAM(S): at 11:59

## 2019-09-11 RX ADMIN — TAMSULOSIN HYDROCHLORIDE 0.4 MILLIGRAM(S): 0.4 CAPSULE ORAL at 05:07

## 2019-09-11 RX ADMIN — Medication 100 MILLIGRAM(S): at 05:07

## 2019-09-11 RX ADMIN — Medication 100 MILLIGRAM(S): at 13:21

## 2019-09-11 RX ADMIN — Medication 2000 UNIT(S): at 11:59

## 2019-09-11 RX ADMIN — Medication 10 UNIT(S): at 17:49

## 2019-09-11 RX ADMIN — LACTULOSE 10 GRAM(S): 10 SOLUTION ORAL at 12:00

## 2019-09-11 RX ADMIN — OXYCODONE HYDROCHLORIDE 10 MILLIGRAM(S): 5 TABLET ORAL at 17:59

## 2019-09-11 RX ADMIN — PANTOPRAZOLE SODIUM 40 MILLIGRAM(S): 20 TABLET, DELAYED RELEASE ORAL at 05:07

## 2019-09-11 RX ADMIN — OXYCODONE HYDROCHLORIDE 10 MILLIGRAM(S): 5 TABLET ORAL at 10:52

## 2019-09-11 RX ADMIN — Medication 10 UNIT(S): at 08:19

## 2019-09-11 RX ADMIN — OXYCODONE HYDROCHLORIDE 10 MILLIGRAM(S): 5 TABLET ORAL at 11:22

## 2019-09-11 RX ADMIN — Medication 100 MILLIGRAM(S): at 17:58

## 2019-09-11 RX ADMIN — Medication 10 UNIT(S): at 12:00

## 2019-09-11 RX ADMIN — OXYCODONE HYDROCHLORIDE 10 MILLIGRAM(S): 5 TABLET ORAL at 05:07

## 2019-09-11 RX ADMIN — MAGNESIUM HYDROXIDE 30 MILLILITER(S): 400 TABLET, CHEWABLE ORAL at 10:55

## 2019-09-11 NOTE — PROGRESS NOTE ADULT - SUBJECTIVE AND OBJECTIVE BOX
Patient seen/examined. Complains of some pain. Tolerated diet. No other issues overnight.         Exam:  Alert and Oriented, No Acute Distress, [place] speaking  Abd: Distended, soft, +BS, Dressing C/D/I, not rigid  Lower Extremities:  Motor 5/5x4, Dulls sensation intact X4  Calves Soft, Non-tender bilaterally  : +Peters in place

## 2019-09-11 NOTE — PROGRESS NOTE ADULT - PROVIDER SPECIALTY LIST ADULT
Anesthesia
Endocrinology
Internal Medicine
Neurosurgery
Orthopedics
Plastic Surgery
SICU
Vascular Surgery
Plastic Surgery
Plastic Surgery
Endocrinology
Internal Medicine
Plastic Surgery
Vascular Surgery

## 2019-09-11 NOTE — PROGRESS NOTE ADULT - ASSESSMENT
A/P: 69 y/o M POD# 7 s/p T11-S1 ALIF; s/p T11-S1 Lami/Fusion 8/27    DVT ppx - Venodynes  WBAT  Pain management prn  Peters to gravity  Diabetic diet as tolerated  Appreciate GI Note  Discharge planning to Acute Rehab

## 2019-09-11 NOTE — DISCHARGE NOTE NURSING/CASE MANAGEMENT/SOCIAL WORK - PATIENT PORTAL LINK FT
You can access the FollowMyHealth Patient Portal offered by [facility] by registering at the following website: [email or web address]. By joining [employer]’s FollowMyHealth portal, you will also be able to view your health information using other applications (apps) compatible with our system.

## 2019-09-23 ENCOUNTER — RX RENEWAL (OUTPATIENT)
Age: 69
End: 2019-09-23

## 2019-09-23 LAB — SURGICAL PATHOLOGY STUDY: SIGNIFICANT CHANGE UP

## 2019-09-30 ENCOUNTER — APPOINTMENT (OUTPATIENT)
Dept: ORTHOPEDIC SURGERY | Facility: CLINIC | Age: 69
End: 2019-09-30
Payer: MEDICARE

## 2019-09-30 ENCOUNTER — CHART COPY (OUTPATIENT)
Age: 69
End: 2019-09-30

## 2019-09-30 PROCEDURE — 72100 X-RAY EXAM L-S SPINE 2/3 VWS: CPT

## 2019-09-30 PROCEDURE — 99024 POSTOP FOLLOW-UP VISIT: CPT

## 2019-09-30 RX ORDER — PREGABALIN 150 MG/1
150 CAPSULE ORAL
Qty: 60 | Refills: 0 | Status: ACTIVE | COMMUNITY
Start: 2019-09-30 | End: 1900-01-01

## 2019-09-30 RX ORDER — OXYCODONE 10 MG/1
10 TABLET ORAL EVERY 6 HOURS
Qty: 120 | Refills: 0 | Status: ACTIVE | COMMUNITY
Start: 2019-09-30 | End: 1900-01-01

## 2019-10-08 ENCOUNTER — APPOINTMENT (OUTPATIENT)
Dept: VASCULAR SURGERY | Facility: CLINIC | Age: 69
End: 2019-10-08
Payer: MEDICARE

## 2019-10-08 VITALS
WEIGHT: 160 LBS | HEIGHT: 64 IN | BODY MASS INDEX: 27.31 KG/M2 | SYSTOLIC BLOOD PRESSURE: 148 MMHG | DIASTOLIC BLOOD PRESSURE: 90 MMHG | HEART RATE: 114 BPM | TEMPERATURE: 97.6 F

## 2019-10-08 DIAGNOSIS — M48.07 SPINAL STENOSIS, LUMBOSACRAL REGION: ICD-10-CM

## 2019-10-08 PROCEDURE — 99024 POSTOP FOLLOW-UP VISIT: CPT

## 2019-10-08 NOTE — HISTORY OF PRESENT ILLNESS
[FreeTextEntry1] : 67 y/o m 5 weeks s/p lumbar laminectomy and fusion with Dr. Koehler, anterior exposure of lumbosacral spine by Dr. Montiel. Pt is doing well, wearing brace and using walker to ambulate. He reports normal appetite and bowel pattern. Denies fever or chills. Reports pain is controlled w/ pain medication.

## 2019-10-08 NOTE — ASSESSMENT
[Medication Management] : medication management [FreeTextEntry1] : 69 y/o m 5 weeks s/p lumbar laminectomy and fusion via anterior approach. Doing well. Incision healing well, no s/s of infection. \par \par Medical Conservative Management\par f/u w/ ortho Dr. Koehler

## 2019-10-08 NOTE — REASON FOR VISIT
[Post Op: _________] : a [unfilled] post op visit [[employer] ] : provided by [employer]   [FreeTextEntry3] : see form  [TWNoteComboBox1] : [place]

## 2019-10-13 PROCEDURE — 85730 THROMBOPLASTIN TIME PARTIAL: CPT

## 2019-10-13 PROCEDURE — 85014 HEMATOCRIT: CPT

## 2019-10-13 PROCEDURE — 84295 ASSAY OF SERUM SODIUM: CPT

## 2019-10-13 PROCEDURE — 36430 TRANSFUSION BLD/BLD COMPNT: CPT

## 2019-10-13 PROCEDURE — 74018 RADEX ABDOMEN 1 VIEW: CPT

## 2019-10-13 PROCEDURE — 88304 TISSUE EXAM BY PATHOLOGIST: CPT

## 2019-10-13 PROCEDURE — 86850 RBC ANTIBODY SCREEN: CPT

## 2019-10-13 PROCEDURE — 86900 BLOOD TYPING SEROLOGIC ABO: CPT

## 2019-10-13 PROCEDURE — 93005 ELECTROCARDIOGRAM TRACING: CPT

## 2019-10-13 PROCEDURE — 97168 OT RE-EVAL EST PLAN CARE: CPT

## 2019-10-13 PROCEDURE — 82565 ASSAY OF CREATININE: CPT

## 2019-10-13 PROCEDURE — 82330 ASSAY OF CALCIUM: CPT

## 2019-10-13 PROCEDURE — 84100 ASSAY OF PHOSPHORUS: CPT

## 2019-10-13 PROCEDURE — P9016: CPT

## 2019-10-13 PROCEDURE — 86901 BLOOD TYPING SEROLOGIC RH(D): CPT

## 2019-10-13 PROCEDURE — 76000 FLUOROSCOPY <1 HR PHYS/QHP: CPT

## 2019-10-13 PROCEDURE — 82553 CREATINE MB FRACTION: CPT

## 2019-10-13 PROCEDURE — 85610 PROTHROMBIN TIME: CPT

## 2019-10-13 PROCEDURE — 97166 OT EVAL MOD COMPLEX 45 MIN: CPT

## 2019-10-13 PROCEDURE — 83735 ASSAY OF MAGNESIUM: CPT

## 2019-10-13 PROCEDURE — 82435 ASSAY OF BLOOD CHLORIDE: CPT

## 2019-10-13 PROCEDURE — 82962 GLUCOSE BLOOD TEST: CPT

## 2019-10-13 PROCEDURE — 97110 THERAPEUTIC EXERCISES: CPT

## 2019-10-13 PROCEDURE — 82947 ASSAY GLUCOSE BLOOD QUANT: CPT

## 2019-10-13 PROCEDURE — 97530 THERAPEUTIC ACTIVITIES: CPT

## 2019-10-13 PROCEDURE — 97116 GAIT TRAINING THERAPY: CPT

## 2019-10-13 PROCEDURE — 82803 BLOOD GASES ANY COMBINATION: CPT

## 2019-10-13 PROCEDURE — 73030 X-RAY EXAM OF SHOULDER: CPT

## 2019-10-13 PROCEDURE — 84484 ASSAY OF TROPONIN QUANT: CPT

## 2019-10-13 PROCEDURE — 97164 PT RE-EVAL EST PLAN CARE: CPT

## 2019-10-13 PROCEDURE — 88311 DECALCIFY TISSUE: CPT

## 2019-10-13 PROCEDURE — 85027 COMPLETE CBC AUTOMATED: CPT

## 2019-10-13 PROCEDURE — 84132 ASSAY OF SERUM POTASSIUM: CPT

## 2019-10-13 PROCEDURE — C1713: CPT

## 2019-10-13 PROCEDURE — 80048 BASIC METABOLIC PNL TOTAL CA: CPT

## 2019-10-13 PROCEDURE — 97162 PT EVAL MOD COMPLEX 30 MIN: CPT

## 2019-10-13 PROCEDURE — P9041: CPT

## 2019-10-13 PROCEDURE — 82550 ASSAY OF CK (CPK): CPT

## 2019-10-13 PROCEDURE — 83605 ASSAY OF LACTIC ACID: CPT

## 2019-10-13 PROCEDURE — C1889: CPT

## 2019-10-13 PROCEDURE — 86923 COMPATIBILITY TEST ELECTRIC: CPT

## 2019-10-13 PROCEDURE — 80061 LIPID PANEL: CPT

## 2019-10-28 ENCOUNTER — APPOINTMENT (OUTPATIENT)
Dept: ORTHOPEDIC SURGERY | Facility: CLINIC | Age: 69
End: 2019-10-28
Payer: MEDICARE

## 2019-10-28 DIAGNOSIS — M40.209 UNSPECIFIED KYPHOSIS, SITE UNSPECIFIED: ICD-10-CM

## 2019-10-28 PROCEDURE — 72100 X-RAY EXAM L-S SPINE 2/3 VWS: CPT

## 2019-10-28 PROCEDURE — 99024 POSTOP FOLLOW-UP VISIT: CPT

## 2019-12-09 ENCOUNTER — APPOINTMENT (OUTPATIENT)
Dept: ORTHOPEDIC SURGERY | Facility: CLINIC | Age: 69
End: 2019-12-09

## 2020-02-03 ENCOUNTER — APPOINTMENT (OUTPATIENT)
Dept: ORTHOPEDIC SURGERY | Facility: CLINIC | Age: 70
End: 2020-02-03
Payer: MEDICARE

## 2020-02-03 DIAGNOSIS — M70.62 TROCHANTERIC BURSITIS, LEFT HIP: ICD-10-CM

## 2020-02-03 PROCEDURE — 99214 OFFICE O/P EST MOD 30 MIN: CPT

## 2020-02-03 PROCEDURE — 72100 X-RAY EXAM L-S SPINE 2/3 VWS: CPT

## 2020-02-03 RX ORDER — CYCLOBENZAPRINE HYDROCHLORIDE 5 MG/1
5 TABLET, FILM COATED ORAL 3 TIMES DAILY
Qty: 90 | Refills: 1 | Status: ACTIVE | COMMUNITY
Start: 2020-02-03 | End: 1900-01-01

## 2020-02-03 RX ORDER — MELOXICAM 15 MG/1
15 TABLET ORAL
Qty: 30 | Refills: 1 | Status: ACTIVE | COMMUNITY
Start: 2020-02-03 | End: 1900-01-01

## 2020-05-04 ENCOUNTER — APPOINTMENT (OUTPATIENT)
Dept: ORTHOPEDIC SURGERY | Facility: CLINIC | Age: 70
End: 2020-05-04
Payer: MEDICARE

## 2020-05-04 DIAGNOSIS — M96.0 PSEUDARTHROSIS AFTER FUSION OR ARTHRODESIS: ICD-10-CM

## 2020-05-04 PROCEDURE — 72100 X-RAY EXAM L-S SPINE 2/3 VWS: CPT

## 2020-05-04 PROCEDURE — 99214 OFFICE O/P EST MOD 30 MIN: CPT

## 2020-05-04 RX ORDER — OXYCODONE AND ACETAMINOPHEN 5; 325 MG/1; MG/1
5-325 TABLET ORAL
Qty: 60 | Refills: 0 | Status: ACTIVE | COMMUNITY
Start: 2020-05-04 | End: 1900-01-01

## 2020-08-10 ENCOUNTER — APPOINTMENT (OUTPATIENT)
Dept: ORTHOPEDIC SURGERY | Facility: CLINIC | Age: 70
End: 2020-08-10

## 2020-12-15 PROBLEM — Z87.440 HISTORY OF URINARY TRACT INFECTION: Status: RESOLVED | Noted: 2017-06-16 | Resolved: 2020-12-15

## 2021-02-11 NOTE — PRE-ANESTHESIA EVALUATION ADULT - TEMPERATURE IN CELSIUS (DEGREES C)
Detail Level: Zone Render In Strict Bullet Format?: No Initiate Treatment: triamcinolone acetonide 0.1 % topical cream \\nSig: Apply to itchy rash BID x 2 wks\\n\\nDiflucan 200mg\\nSig: Take 1 PO x 2 days, then repeat in 2 wks 37.3

## 2021-05-10 ENCOUNTER — APPOINTMENT (OUTPATIENT)
Dept: ORTHOPEDIC SURGERY | Facility: CLINIC | Age: 71
End: 2021-05-10
Payer: MEDICARE

## 2021-05-10 DIAGNOSIS — M46.1 SACROILIITIS, NOT ELSEWHERE CLASSIFIED: ICD-10-CM

## 2021-05-10 PROCEDURE — 72100 X-RAY EXAM L-S SPINE 2/3 VWS: CPT

## 2021-05-10 PROCEDURE — 99072 ADDL SUPL MATRL&STAF TM PHE: CPT

## 2021-05-10 PROCEDURE — 99214 OFFICE O/P EST MOD 30 MIN: CPT

## 2021-05-10 RX ORDER — ALBUTEROL SULFATE 90 UG/1
108 (90 BASE) INHALANT RESPIRATORY (INHALATION)
Qty: 18 | Refills: 0 | Status: ACTIVE | COMMUNITY
Start: 2021-04-26

## 2021-05-10 RX ORDER — INSULIN ASPART 100 [IU]/ML
100 INJECTION, SOLUTION INTRAVENOUS; SUBCUTANEOUS
Qty: 36 | Refills: 0 | Status: COMPLETED | COMMUNITY
Start: 2021-04-15

## 2021-05-10 RX ORDER — FUROSEMIDE 40 MG/1
40 TABLET ORAL
Qty: 90 | Refills: 0 | Status: ACTIVE | COMMUNITY
Start: 2021-01-05

## 2021-05-10 RX ORDER — LOSARTAN POTASSIUM 100 MG/1
100 TABLET, FILM COATED ORAL
Qty: 90 | Refills: 0 | Status: ACTIVE | COMMUNITY
Start: 2020-07-06

## 2021-05-10 RX ORDER — FLUTICASONE FUROATE AND VILANTEROL TRIFENATATE 200; 25 UG/1; UG/1
200-25 POWDER RESPIRATORY (INHALATION)
Qty: 60 | Refills: 0 | Status: ACTIVE | COMMUNITY
Start: 2021-04-26

## 2021-05-10 RX ORDER — DAPAGLIFLOZIN 5 MG/1
5 TABLET, FILM COATED ORAL
Qty: 5 | Refills: 0 | Status: ACTIVE | COMMUNITY
Start: 2021-05-08

## 2021-05-10 RX ORDER — LANCETS
EACH MISCELLANEOUS
Qty: 1 | Refills: 0 | Status: ACTIVE | COMMUNITY
Start: 2021-02-22

## 2021-05-10 RX ORDER — AMLODIPINE BESYLATE 5 MG/1
5 TABLET ORAL
Qty: 90 | Refills: 0 | Status: ACTIVE | COMMUNITY
Start: 2020-09-24

## 2021-05-10 RX ORDER — INSULIN GLARGINE 100 [IU]/ML
100 INJECTION, SOLUTION SUBCUTANEOUS
Qty: 30 | Refills: 0 | Status: ACTIVE | COMMUNITY
Start: 2021-04-19

## 2021-05-10 RX ORDER — PREGABALIN 200 MG/1
200 CAPSULE ORAL
Qty: 60 | Refills: 0 | Status: ACTIVE | COMMUNITY
Start: 2021-01-24

## 2021-05-10 RX ORDER — DICLOFENAC SODIUM 1% 10 MG/G
1 GEL TOPICAL
Qty: 100 | Refills: 0 | Status: ACTIVE | COMMUNITY
Start: 2021-05-03

## 2021-05-10 RX ORDER — MULTIVIT-MIN/FOLIC/VIT K/LYCOP 400-300MCG
50 MCG TABLET ORAL
Qty: 30 | Refills: 0 | Status: ACTIVE | COMMUNITY
Start: 2020-08-18

## 2021-10-05 NOTE — PHYSICAL EXAM
[address] [facility] PRIMARY CARE  [address] [facility] [address]  Dept: [phone]    Pawel Kan is a 47 y.o. female who presents today for her medical conditions/complaints as noted below. Chief Complaint   Patient presents with   [address]     discuss labs, patient said she had lab work done due to feeling fatigue and, abdominal pain and rash re accuring. HPI:     HPI     Started first day of school--end ofAugust. Was blaming on sinus and migraines. Glucose is elevated at 110  Cholesterol is high at total 265, LDL of 163, grazyna of 277, HDL of 47 and ratio at 5.6     Tried several things on face in the nasolabial fold ---bumpy, flaking  LDL Cholesterol (mg/dL)   Date Value   10/01/2021 163 (H)   2021 167 (H)   03/15/2018 172 (H)       (goal LDL is <100)   AST (U/L)   Date Value   10/01/2021 21     ALT (U/L)   Date Value   10/01/2021 17     BUN (mg/dL)   Date Value   10/01/2021 14     BP Readings from Last 3 Encounters:   10/05/21 126/78   21 124/76   21 136/82          (goal 120/80)    Past Medical History:   Diagnosis Date    Asthma     Hypertension     MRSA infection     Osteoarthritis of right knee       Past Surgical History:   Procedure Laterality Date     SECTION      KNEE SURGERY Right     5 surgeries between -       No family history on file. Social History     Tobacco Use    Smoking status: Never Smoker    Smokeless tobacco: Never Used   Substance Use Topics    Alcohol use: No     Alcohol/week: 0.0 standard drinks      Current Outpatient Medications   Medication Sig Dispense Refill    metroNIDAZOLE (METROGEL) 1 % gel Apply topically daily. 45 g 1    rosuvastatin (CRESTOR) 10 MG tablet Take 1 tablet by mouth nightly 30 tablet 3    liraglutide-weight management 18 MG/3ML SOPN Inject SC into abdomen, thigh or upper arm once daily.    Week 1: 0.6mg daily  Week 2: 1.2mg daily  Week 3: 1.8 mg daily  Week 4: 2.4 mg daily  Week 5 and onward: 3.0mg 5 pen 0    lisinopril-hydroCHLOROthiazide (PRINZIDE;ZESTORETIC) 10-12.5 MG per tablet TAKE 1 TABLET BY MOUTH ONE TIME A DAY 30 tablet 5    fluticasone-salmeterol (ADVAIR) 500-50 MCG/DOSE diskus inhaler INHALE 1 INHALATION ORALLY EVERY TWELVE HOURS 60 each 5    montelukast (SINGULAIR) 10 MG tablet TAKE 1 TABLET BY MOUTH IN THE EVENING (Patient taking differently: as needed TAKE 1 TABLET BY MOUTH IN THE EVENING ) 30 tablet 5    celecoxib (CELEBREX) 200 MG capsule TAKE 1 CAPSULE BY MOUTH ONE TIME A DAY 30 capsule 5    pantoprazole (PROTONIX) 40 MG tablet TAKE 1 TABLET BY MOUTH ONE TIME A DAY 30 tablet 5    albuterol sulfate HFA (PROAIR HFA) 108 (90 Base) MCG/ACT inhaler Inhale 2 puffs into the lungs every 6 hours as needed for Wheezing 1 Inhaler 5    fluticasone (FLONASE) 50 MCG/ACT nasal spray 2 sprays by Nasal route daily 1 Bottle 5    Vitamins A & D (VITAMIN A & D PO) Take by mouth      b complex vitamins capsule Take 1 capsule by mouth daily      Probiotic Product (PROBIOTIC ADVANCED PO) Take by mouth      MAGNESIUM-OXIDE PO Take by mouth (Patient not taking: Reported on 10/5/2021)       No current facility-administered medications for this visit. Allergies   Allergen Reactions    Topamax [Topiramate] Other (See Comments)     Emotional lability    Penicillins Other (See Comments)     Was [age] when had a reaction    Eggs Or Egg-Derived Products Diarrhea     Abdominal cramping.        Health Maintenance   Topic Date Due    Hepatitis C screen  Never done    Cervical cancer screen  Never done    Colon cancer screen colonoscopy  Never done    Shingles Vaccine (1 of 2) 10/05/2022 (Originally 9/6/2020)    Flu vaccine (1) 12/04/2038 (Originally 9/1/2021)    A1C test (Diabetic or Prediabetic)  02/09/2022    Lipid screen  10/01/2022    Potassium monitoring  10/01/2022    Creatinine monitoring  10/01/2022    DTaP/Tdap/Td vaccine (2 - Td or Tdap) 10/15/2022    Breast cancer screen  06/18/2023    Pneumococcal 0-64 years Vaccine (2 of 2 - PPSV23) 09/06/2035    COVID-19 Vaccine  Completed    Hepatitis A vaccine  Aged Out    Hepatitis B vaccine  Aged Out    Hib vaccine  Aged Out    Meningococcal (ACWY) vaccine  Aged Out    HIV screen  Discontinued       Subjective:      Review of Systems   Constitutional: Positive for fatigue. Negative for chills, diaphoresis and fever. Unexpected weight change: gaining weight. Respiratory: Negative for cough, chest tightness and shortness of breath. Cardiovascular: Positive for leg swelling (feet and ankles). Gastrointestinal: Positive for abdominal pain (middle has resolved--was last week for a few days) and vomiting. Negative for blood in stool. Newly regular BMs   Genitourinary: Positive for menstrual problem. Skin: Positive for rash (on face ). Hair has gone straight    Neurological: Positive for headaches. Psychiatric/Behavioral:        Davis        Objective:     /78   Pulse 93   Ht 5' 5\" (1.651 m)   Wt 269 lb (122 kg)   SpO2 98%   BMI 44.76 kg/m²   Physical Exam  Vitals and nursing note reviewed. Constitutional:       General: She is not in acute distress. Appearance: She is well-developed. She is not ill-appearing. HENT:      Head: Normocephalic and atraumatic. Right Ear: External ear normal.      Left Ear: External ear normal.   Eyes:      General: No scleral icterus. Right eye: No discharge. Left eye: No discharge. Conjunctiva/sclera: Conjunctivae normal.      Pupils: Pupils are equal, round, and reactive to light. Neck:      Thyroid: No thyromegaly. Trachea: No tracheal deviation. Cardiovascular:      Rate and Rhythm: Normal rate and regular rhythm. Heart sounds: Normal heart sounds. Pulmonary:      Effort: Pulmonary effort is normal. No respiratory distress. Breath sounds: Normal breath sounds. No wheezing. Lymphadenopathy:      Cervical: No cervical adenopathy. Skin:     General: Skin is warm. Findings: No rash. Neurological:      Mental Status: She is alert and oriented to person, place, and time. Psychiatric:         Mood and Affect: Mood normal.         Behavior: Behavior normal.         Thought Content: Thought content normal.         Assessment:       Diagnosis Orders   1. Impaired fasting glucose  POCT glycosylated hemoglobin (Hb A1C)   2. Seborrheic dermatitis  metroNIDAZOLE (METROGEL) 1 % gel    Testosterone   3. Chronic fatigue  Follicle Stimulating Hormone    Luteinizing Hormone   4. Dyslipidemia  Lipid Panel    ALT    AST   5. Fatigue, unspecified type     6. Change in hair  Follicle Stimulating Hormone    Luteinizing Hormone    Testosterone   7. Perimenopause  Follicle Stimulating Hormone    Luteinizing Hormone    Testosterone        Plan:    Cleanance Cleansing Gel sample given  Metrogel daily  Increase Vit D to 4000U   Start low impact exercise daily  Start Saxenda for weight loss   Start generic Crestor  REcheck BW in 8 weeks   Check hormone BW in 8 weeks. No follow-ups on file.     Orders Placed This Encounter   Procedures    Follicle Stimulating Hormone     Standing Status:   Future     Standing Expiration Date:   10/5/2022    Luteinizing Hormone     Standing Status:   Future     Standing Expiration Date:   10/5/2022    Testosterone     Standing Status:   Future     Standing Expiration Date:   10/5/2022    Lipid Panel     Standing Status:   Future     Standing Expiration Date:   10/5/2022     Order Specific Question:   Is Patient Fasting?/# of Hours     Answer:   10-12 hours    ALT     Standing Status:   Future     Standing Expiration Date:   10/5/2022    AST     Standing Status:   Future     Standing Expiration Date:   10/5/2022    POCT glycosylated hemoglobin (Hb A1C)     Orders Placed This Encounter   Medications    metroNIDAZOLE (METROGEL) 1 % gel     Sig: Apply topically [2+] : left 2+ [Alert] : alert [Oriented to Person] : oriented to person [Oriented to Place] : oriented to place [Oriented to Time] : oriented to time [Calm] : calm [de-identified] : well in nad [Ankle Swelling (On Exam)] : not present [de-identified] : soft non tender. Abdominal LLQ incision well approximated. No drainage, erythema or s/s of infection.  [de-identified] : cooperative

## 2021-10-07 NOTE — H&P PST ADULT - GASTROINTESTINAL
Hide Additional Notes?: No Detail Level: Simple Detail Level: Zone Include Location In Plan?: Yes details… Soft, non-tender, no hepatosplenomegaly, normal bowel sounds detailed exam

## 2022-01-08 NOTE — H&P PST ADULT - ASSESSMENT
- - - CAPRINI SCORE [CLOT]    AGE RELATED RISK FACTORS                                                       MOBILITY RELATED FACTORS  [ ] Age 41-60 years                                            (1 Point)                  [ ] Bed rest                                                        (1 Point)  [x ] Age: 61-74 years                                           (2 Points)                 [ ] Plaster cast                                                   (2 Points)  [ ] Age= 75 years                                              (3 Points)                 [ ] Bed bound for more than 72 hours                 (2 Points)    DISEASE RELATED RISK FACTORS                                               GENDER SPECIFIC FACTORS  [ ] Edema in the lower extremities                       (1 Point)                  [ ] Pregnancy                                                     (1 Point)  [ ] Varicose veins                                               (1 Point)                  [ ] Post-partum < 6 weeks                                   (1 Point)             [x ] BMI > 25 Kg/m2                                            (1 Point)                  [ ] Hormonal therapy  or oral contraception          (1 Point)                 [ ] Sepsis (in the previous month)                        (1 Point)                  [ ] History of pregnancy complications                 (1 point)  [ ] Pneumonia or serious lung disease                                               [ ] Unexplained or recurrent                     (1 Point)           (in the previous month)                               (1 Point)  [ ] Abnormal pulmonary function test                     (1 Point)                 SURGERY RELATED RISK FACTORS  [ ] Acute myocardial infarction                              (1 Point)                 [ ]  Section                                             (1 Point)  [ ] Congestive heart failure (in the previous month)  (1 Point)               [ ] Minor surgery                                                  (1 Point)   [ ] Inflammatory bowel disease                             (1 Point)                 [ ] Arthroscopic surgery                                        (2 Points)  [ ] Central venous access                                      (2 Points)                [x ] General surgery lasting more than 45 minutes   (2 Points)       [ ] Stroke (in the previous month)                          (5 Points)               [ ] Elective arthroplasty                                         (5 Points)                                                                                                                                               HEMATOLOGY RELATED FACTORS                                                 TRAUMA RELATED RISK FACTORS  [ ] Prior episodes of VTE                                     (3 Points)                [ ] Fracture of the hip, pelvis, or leg                       (5 Points)  [ ] Positive family history for VTE                         (3 Points)                 [ ] Acute spinal cord injury (in the previous month)  (5 Points)  [ ] Prothrombin 28522 A                                     (3 Points)                 [ ] Paralysis  (less than 1 month)                             (5 Points)  [ ] Factor V Leiden                                             (3 Points)                  [ ] Multiple Trauma within 1 month                        (5 Points)  [ ] Lupus anticoagulants                                     (3 Points)                                                           [ ] Anticardiolipin antibodies                               (3 Points)                                                       [ ] High homocysteine in the blood                      (3 Points)                                             [ ] Other congenital or acquired thrombophilia      (3 Points)                                                [ ] Heparin induced thrombocytopenia                  (3 Points)                                          Total Score [       5   ]    Caprini Score 0 - 2:  Low Risk, No VTE Prophylaxis required for most patients, encourage ambulation  Caprini Score 3 - 6:  At Risk, pharmacologic VTE prophylaxis is indicated for most patients (in the absence of a contraindication)  Caprini Score Greater than or = 7:  High Risk, pharmacologic VTE prophylaxis is indicated for most patients (in the absence of a contraindication)

## 2022-02-01 ENCOUNTER — APPOINTMENT (OUTPATIENT)
Dept: UROLOGY | Facility: CLINIC | Age: 72
End: 2022-02-01
Payer: MEDICARE

## 2022-02-01 VITALS
HEIGHT: 64 IN | DIASTOLIC BLOOD PRESSURE: 76 MMHG | OXYGEN SATURATION: 92 % | RESPIRATION RATE: 16 BRPM | TEMPERATURE: 97.7 F | HEART RATE: 83 BPM | SYSTOLIC BLOOD PRESSURE: 107 MMHG | BODY MASS INDEX: 26.63 KG/M2 | WEIGHT: 156 LBS

## 2022-02-01 PROCEDURE — 99204 OFFICE O/P NEW MOD 45 MIN: CPT

## 2022-02-01 NOTE — HISTORY OF PRESENT ILLNESS
[FreeTextEntry1] : BRITANY JORGE is a 71 year M with a history of lumbar radiculitis status post laminectomy, type 2 diabetes, COPD, CHF, MI, CKD stage III (b/l 1.6 - 2.1) who presents with urinary retention. \par \par 18 days ago, he presented to [facility] ER with gross hematuria after straining to have a bowel movement.  We have incomplete records at this time, but reportedly a catheter was placed and he was monitored for postobstructive diuresis, and placed on antibiotics for a positive urine culture.  A CT scan was reportedly performed, the demonstrated "severe right-sided hydronephrosis, and moderate left hydronephrosis with a thickened bladder wall and enlarged prostate, suggestive of bladder outlet obstruction". He was discharged from the hospital with plans for outpatient follow-up. He is on Flomax and finasteride.\par \par He previously followed with Dr. Jacobsen, and was last seen in 2018. He has a history of BPH and urinary retention s/p TURP 6/28/17 by Dr. Jacobsen. Following surgery, he continued to have elevated PVRs and some LUTS. \par UDS 2/2018: QMax 8 -  mL, flow pattern staccato. No DO. Capacity 338 mL, normal sensation, possibly valsalva-voiding. \par \par He is a never smoker.  He reports a prior to this, he had "no issues voiding," but when questioned further, he reports that he wears a diaper both during the day and at night. He states that he voids every 2-3 hours but also has incontinence episodes between voids that he does not notice. \par \par CT Lumbar 2019: Bilateral hydronephrosis, left moderate, right mild

## 2022-02-01 NOTE — ASSESSMENT
[FreeTextEntry1] : BRITANY JORGE is a 71 year M with a history of of lumbar radiculitis status post laminectomy, type 2 diabetes, COPD, CHF, MI, CKD stage III (b/l 1.6 - 2.1), and BPH s/p TURP 2017 who presents with urinary retention with Peters catheter placed, and gross hematuria. \par \par #Urinary retention:\par - Unclear etiology.  History of TURP in the past, but also history of UDS from AFTER the TURP that is equivocal for bladder outlet obstruction versus detrusor underactivity.  Today, his bladder was sterilely filled with 180 mL, and he was able to void 130 cc with a PVR of 80 cc. He is very motivated to have his catheter removed. Given that he was able to void more than 50% of what is in his bladder, I am agreeable to this. I would like to get a follow-up renal ultrasound given that report of hydronephrosis seen on outside CT. He does have hydro seen on lumbar CT from 2019 (L>R) so some of this maybe chronic, but this should be followed. \par - BMP (baseline Cr. appears around 1.6 - 2.1)\par - Renal US\par - Continue flomax / finasteride\par - OSH records from [place]\par \par #Gross hematuria\par - Urine cytology, cystoscopy\par - Renal US - if creatinine is <1.5 will order CTU rather than renal US

## 2022-02-01 NOTE — PHYSICAL EXAM
[General Appearance - Well Developed] : well developed [General Appearance - Well Nourished] : well nourished [Heart Rate And Rhythm] : Heart rate and rhythm were normal [] : no respiratory distress [Respiration, Rhythm And Depth] : normal respiratory rhythm and effort [Bowel Sounds] : normal bowel sounds [Costovertebral Angle Tenderness] : no ~M costovertebral angle tenderness [Urethral Meatus] : meatus normal [Epididymis] : the epididymides were normal [Testes Tenderness] : no tenderness of the testes [Prostate Enlargement] : the prostate was not enlarged [Prostate Tenderness] : the prostate was not tender [No Prostate Nodules] : no prostate nodules [Normal Station and Gait] : the gait and station were normal for the patient's age [Skin Color & Pigmentation] : normal skin color and pigmentation [Oriented To Time, Place, And Person] : oriented to person, place, and time

## 2022-02-01 NOTE — HISTORY OF PRESENT ILLNESS
[FreeTextEntry1] : BRITANY JORGE is a 71 year M with a history of lumbar radiculitis status post laminectomy, type 2 diabetes, COPD, CHF, MI, CKD stage III (b/l 1.6 - 2.1) who presents with urinary retention. \par \par 18 days ago, he presented to [facility] ER with gross hematuria after straining to have a bowel movement.  We have incomplete records at this time, but reportedly a catheter was placed and he was monitored for postobstructive diuresis, and placed on antibiotics for a positive urine culture.  A CT scan was reportedly performed, the demonstrated "severe right-sided hydronephrosis, and moderate left hydronephrosis with a thickened bladder wall and enlarged prostate, suggestive of bladder outlet obstruction". He was discharged from the hospital with plans for outpatient follow-up. He is on Flomax and finasteride.\par \par He previously followed with Dr. Jacobsen, and was last seen in 2018. He has a history of BPH and urinary retention s/p TURP 6/28/17 by Dr. Jacobsen. Following surgery, he continued to have elevated PVRs and some LUTS. \par UDS 2/2018: QMax 8 -  mL, flow pattern staccato. No DO. Capacity 338 mL, normal sensation, possibly valsalva-voiding. \par \par He is a never smoker.  He reports a prior to this, he had "no issues voiding," but when questioned further, he reports that he wears a diaper both during the day and at night. He states that he voids every 2-3 hours but also has incontinence episodes between voids that he does not notice. \par \par CT Lumbar 2019: Bilateral hydronephrosis, left moderate, right mild

## 2022-02-01 NOTE — ASSESSMENT
[FreeTextEntry1] : BRITANY JORGE is a 71 year M with a history of of lumbar radiculitis status post laminectomy, type 2 diabetes, COPD, CHF, MI, CKD stage III (b/l 1.6 - 2.1), and BPH s/p TURP 2017 who presents with urinary retention with Peters catheter placed, and gross hematuria. \par \par #Urinary retention:\par - Unclear etiology.  History of TURP in the past, but also history of UDS from AFTER the TURP that is equivocal for bladder outlet obstruction versus detrusor underactivity.  Today, his bladder was sterilely filled with 180 mL, and he was able to void 130 cc with a PVR of 80 cc. He is very motivated to have his catheter removed. Given that he was able to void more than 50% of what is in his bladder, I am agreeable to this. I would like to get a follow-up renal ultrasound given that report of hydronephrosis seen on outside CT. He does have hydro seen on lumbar CT from 2019 (L>R) so some of this maybe chronic, but this should be followed. \par - BMP (baseline Cr. appears around 1.6 - 2.1)\par - Renal US\par - Continue flomax / finasteride\par - OSH records from [place]\par \par #Gross hematuria\par - Urine cytology, cystoscopy\par - Renal US - if creatinine is <1.5 will order CTU rather than renal US

## 2022-02-02 RX ORDER — OMEPRAZOLE 40 MG/1
40 CAPSULE, DELAYED RELEASE ORAL
Refills: 0 | Status: ACTIVE | COMMUNITY
Start: 2022-02-02

## 2022-02-03 LAB
ANION GAP SERPL CALC-SCNC: 17 MMOL/L
BUN SERPL-MCNC: 48 MG/DL
CALCIUM SERPL-MCNC: 9.4 MG/DL
CHLORIDE SERPL-SCNC: 96 MMOL/L
CO2 SERPL-SCNC: 20 MMOL/L
CREAT SERPL-MCNC: 1.96 MG/DL
GLUCOSE SERPL-MCNC: 228 MG/DL
POTASSIUM SERPL-SCNC: 5.2 MMOL/L
SODIUM SERPL-SCNC: 133 MMOL/L
URINE CYTOLOGY: NORMAL

## 2022-02-18 ENCOUNTER — RESULT REVIEW (OUTPATIENT)
Age: 72
End: 2022-02-18

## 2022-02-18 ENCOUNTER — OUTPATIENT (OUTPATIENT)
Dept: OUTPATIENT SERVICES | Facility: HOSPITAL | Age: 72
LOS: 1 days | End: 2022-02-18
Payer: COMMERCIAL

## 2022-02-18 ENCOUNTER — APPOINTMENT (OUTPATIENT)
Dept: ULTRASOUND IMAGING | Facility: CLINIC | Age: 72
End: 2022-02-18
Payer: MEDICARE

## 2022-02-18 DIAGNOSIS — Z90.79 ACQUIRED ABSENCE OF OTHER GENITAL ORGAN(S): Chronic | ICD-10-CM

## 2022-02-18 DIAGNOSIS — Z98.890 OTHER SPECIFIED POSTPROCEDURAL STATES: Chronic | ICD-10-CM

## 2022-02-18 DIAGNOSIS — Z95.1 PRESENCE OF AORTOCORONARY BYPASS GRAFT: Chronic | ICD-10-CM

## 2022-02-18 DIAGNOSIS — R31.0 GROSS HEMATURIA: ICD-10-CM

## 2022-02-18 PROCEDURE — 76775 US EXAM ABDO BACK WALL LIM: CPT | Mod: 26

## 2022-02-18 PROCEDURE — 76775 US EXAM ABDO BACK WALL LIM: CPT

## 2022-02-24 ENCOUNTER — APPOINTMENT (OUTPATIENT)
Dept: UROLOGY | Facility: CLINIC | Age: 72
End: 2022-02-24
Payer: MEDICARE

## 2022-02-24 VITALS
OXYGEN SATURATION: 91 % | SYSTOLIC BLOOD PRESSURE: 135 MMHG | HEART RATE: 97 BPM | RESPIRATION RATE: 14 BRPM | TEMPERATURE: 98.6 F | DIASTOLIC BLOOD PRESSURE: 76 MMHG | HEIGHT: 64 IN | WEIGHT: 156 LBS | BODY MASS INDEX: 26.63 KG/M2

## 2022-02-24 PROCEDURE — 52000 CYSTOURETHROSCOPY: CPT

## 2022-04-05 ENCOUNTER — APPOINTMENT (OUTPATIENT)
Dept: UROLOGY | Facility: CLINIC | Age: 72
End: 2022-04-05
Payer: MEDICARE

## 2022-04-05 PROCEDURE — 99213 OFFICE O/P EST LOW 20 MIN: CPT

## 2022-04-05 NOTE — REASON FOR VISIT
[Initial Visit ___] : [unfilled] is here today for an initial visit  for [unfilled] [[employer] ] : provided by [employer]   [Interpreters_IDNumber] : [id number]

## 2022-04-05 NOTE — HISTORY OF PRESENT ILLNESS
[FreeTextEntry1] : Mr Smith is a  71 y.o. M who presents for follow-up.\par \par To review, he was seen by me on Feb 1 after being seen with gross hematuria in the [place] ED. A CT scan was reportedly performed, the demonstrated "severe right-sided hydronephrosis, and moderate left hydronephrosis with a thickened bladder wall and enlarged prostate, suggestive of bladder outlet obstruction".\par \par I had recommended proceeding to the OR for cystoscopy, urethral dilation, possible bladder biopsy, right retrograde pyelogram, possible right ureteroscopy / biopsy, possible stent. \par \par He was subsequently admitted to [facility] for UTI / pneumonia. We do not have full records at this time, but he tells me a catheter was placed "with difficulty" which required "stretching" while he was awake. He has a 16F Pokagon-tipped catheter currently in place. \par \par He previously followed with Dr. Jacobsen, and was last seen in 2018. He has a history of BPH and urinary retention s/p TURP 6/28/17 by Dr. Jacobsen. Following surgery, he continued to have elevated PVRs and some LUTS. \par UDS 2/2018: QMax 8 -  mL, flow pattern staccato. No DO. Capacity 338 mL, normal sensation, possibly valsalva-voiding. \par \par He is a never smoker. He reports a prior to this, he had "no issues voiding," but when questioned further, he reports that he wears a diaper both during the day and at night. He states that he voids every 2-3 hours but also has incontinence episodes between voids that he does not notice. \par \par CT Lumbar 2019: Bilateral hydronephrosis, left moderate, right mild

## 2022-04-05 NOTE — PHYSICAL EXAM
[General Appearance - Well Developed] : well developed [Bowel Sounds] : normal bowel sounds [Urethral Meatus] : meatus normal [FreeTextEntry1] : 16F catheter draining yellow urine. left testicle indurated, tender. no fluctuance [Skin Color & Pigmentation] : normal skin color and pigmentation [Heart Rate And Rhythm] : Heart rate and rhythm were normal [] : no respiratory distress [Oriented To Time, Place, And Person] : oriented to person, place, and time

## 2022-04-05 NOTE — ASSESSMENT
[FreeTextEntry1] : BRITANY JORGE is a 71 year M with a history of of lumbar radiculitis status post laminectomy, type 2 diabetes, COPD, CHF, MI, CKD stage III (b/l 1.6 - 2.1), and BPH s/p TURP 2017 who presents with urinary retention with Peters catheter placed, and gross hematuria. \par \par - Reschedule for surgery - cystoscopy, urethral dilation, possible bladder biopsy, right retrograde pyelogram, possible URS\par - Levaquin x 10 days given epididym-oorchitis\par - UA / UCx\par - RV in 2 weeks for cath change\par - Will [facility]\par - Continue flomax / finasteride\par

## 2022-04-08 DIAGNOSIS — N45.1 EPIDIDYMITIS: ICD-10-CM

## 2022-04-08 LAB — BACTERIA UR CULT: ABNORMAL

## 2022-04-11 ENCOUNTER — APPOINTMENT (OUTPATIENT)
Dept: UROLOGY | Facility: HOSPITAL | Age: 72
End: 2022-04-11

## 2022-04-18 ENCOUNTER — OUTPATIENT (OUTPATIENT)
Dept: OUTPATIENT SERVICES | Facility: HOSPITAL | Age: 72
LOS: 1 days | End: 2022-04-18
Payer: COMMERCIAL

## 2022-04-18 VITALS
RESPIRATION RATE: 16 BRPM | WEIGHT: 160.94 LBS | HEART RATE: 78 BPM | SYSTOLIC BLOOD PRESSURE: 146 MMHG | OXYGEN SATURATION: 99 % | DIASTOLIC BLOOD PRESSURE: 91 MMHG | HEIGHT: 63 IN | TEMPERATURE: 98 F

## 2022-04-18 DIAGNOSIS — Z98.890 OTHER SPECIFIED POSTPROCEDURAL STATES: Chronic | ICD-10-CM

## 2022-04-18 DIAGNOSIS — N40.1 BENIGN PROSTATIC HYPERPLASIA WITH LOWER URINARY TRACT SYMPTOMS: ICD-10-CM

## 2022-04-18 DIAGNOSIS — R35.0 FREQUENCY OF MICTURITION: ICD-10-CM

## 2022-04-18 DIAGNOSIS — Z01.818 ENCOUNTER FOR OTHER PREPROCEDURAL EXAMINATION: ICD-10-CM

## 2022-04-18 DIAGNOSIS — E11.9 TYPE 2 DIABETES MELLITUS WITHOUT COMPLICATIONS: ICD-10-CM

## 2022-04-18 DIAGNOSIS — Z95.1 PRESENCE OF AORTOCORONARY BYPASS GRAFT: Chronic | ICD-10-CM

## 2022-04-18 DIAGNOSIS — R31.0 GROSS HEMATURIA: ICD-10-CM

## 2022-04-18 DIAGNOSIS — I25.10 ATHEROSCLEROTIC HEART DISEASE OF NATIVE CORONARY ARTERY WITHOUT ANGINA PECTORIS: ICD-10-CM

## 2022-04-18 DIAGNOSIS — Z90.79 ACQUIRED ABSENCE OF OTHER GENITAL ORGAN(S): Chronic | ICD-10-CM

## 2022-04-18 DIAGNOSIS — N39.41 URGE INCONTINENCE: ICD-10-CM

## 2022-04-18 LAB
A1C WITH ESTIMATED AVERAGE GLUCOSE RESULT: 9.5 % — HIGH (ref 4–5.6)
ANION GAP SERPL CALC-SCNC: 15 MMOL/L — SIGNIFICANT CHANGE UP (ref 5–17)
BUN SERPL-MCNC: 37 MG/DL — HIGH (ref 7–23)
CALCIUM SERPL-MCNC: 9 MG/DL — SIGNIFICANT CHANGE UP (ref 8.4–10.5)
CHLORIDE SERPL-SCNC: 103 MMOL/L — SIGNIFICANT CHANGE UP (ref 96–108)
CO2 SERPL-SCNC: 19 MMOL/L — LOW (ref 22–31)
CREAT SERPL-MCNC: 2.2 MG/DL — HIGH (ref 0.5–1.3)
EGFR: 31 ML/MIN/1.73M2 — LOW
ESTIMATED AVERAGE GLUCOSE: 226 MG/DL — HIGH (ref 68–114)
GLUCOSE SERPL-MCNC: 105 MG/DL — HIGH (ref 70–99)
HCT VFR BLD CALC: 36.3 % — LOW (ref 39–50)
HGB BLD-MCNC: 11.6 G/DL — LOW (ref 13–17)
MCHC RBC-ENTMCNC: 27 PG — SIGNIFICANT CHANGE UP (ref 27–34)
MCHC RBC-ENTMCNC: 32 GM/DL — SIGNIFICANT CHANGE UP (ref 32–36)
MCV RBC AUTO: 84.6 FL — SIGNIFICANT CHANGE UP (ref 80–100)
NRBC # BLD: 0 /100 WBCS — SIGNIFICANT CHANGE UP (ref 0–0)
PLATELET # BLD AUTO: 313 K/UL — SIGNIFICANT CHANGE UP (ref 150–400)
POTASSIUM SERPL-MCNC: 5.3 MMOL/L — SIGNIFICANT CHANGE UP (ref 3.5–5.3)
POTASSIUM SERPL-SCNC: 5.3 MMOL/L — SIGNIFICANT CHANGE UP (ref 3.5–5.3)
RBC # BLD: 4.29 M/UL — SIGNIFICANT CHANGE UP (ref 4.2–5.8)
RBC # FLD: 15.2 % — HIGH (ref 10.3–14.5)
SODIUM SERPL-SCNC: 137 MMOL/L — SIGNIFICANT CHANGE UP (ref 135–145)
WBC # BLD: 9.14 K/UL — SIGNIFICANT CHANGE UP (ref 3.8–10.5)
WBC # FLD AUTO: 9.14 K/UL — SIGNIFICANT CHANGE UP (ref 3.8–10.5)

## 2022-04-18 PROCEDURE — 80048 BASIC METABOLIC PNL TOTAL CA: CPT

## 2022-04-18 PROCEDURE — G0463: CPT

## 2022-04-18 PROCEDURE — 83036 HEMOGLOBIN GLYCOSYLATED A1C: CPT

## 2022-04-18 PROCEDURE — 71046 X-RAY EXAM CHEST 2 VIEWS: CPT

## 2022-04-18 PROCEDURE — 87086 URINE CULTURE/COLONY COUNT: CPT

## 2022-04-18 PROCEDURE — 71046 X-RAY EXAM CHEST 2 VIEWS: CPT | Mod: 26

## 2022-04-18 PROCEDURE — 85027 COMPLETE CBC AUTOMATED: CPT

## 2022-04-18 RX ORDER — LOSARTAN POTASSIUM 100 MG/1
1 TABLET, FILM COATED ORAL
Qty: 0 | Refills: 0 | DISCHARGE

## 2022-04-18 NOTE — H&P PST ADULT - ATTENDING COMMENTS
seen and examined  plan for cysto, urethral dilation  possible bladder biopsy  right RGP, possible stent, possible URS

## 2022-04-18 NOTE — H&P PST ADULT - PROBLEM SELECTOR PLAN 1
Cystoscopy, Urethral Dilation, Possible Bladder Biopsy, Right Retrograde Pyelogram, Possible Ureteroscopy, Possible Biopsy on 05/09/22.    pending.

## 2022-04-18 NOTE — H&P PST ADULT - HISTORY OF PRESENT ILLNESS
72 yo Kyrgyz speaking male from [facility], PMH CAD s/p CABG X 4 2015, CHF (EF 50-54% - Echo 2019), severe spinal stenosis s/p multiple fusions, DM2 on Insulin, BPH s/p TURP 2017, hospitalized in 3/2019 with UTI 2/2 urinary retention, h/o gross hematuria with urinary retention - hospitalized to [place] on 02/01/22 for UTI/pneumonia, 16F catheter currently in place. A CT scan was reportedly performed, the demonstrated "severe right-sided hydronephrosis, and moderate left hydronephrosis with a thickened bladder wall and enlarged prostate, suggestive of bladder outlet obstruction".    Patient is scheduled for Cystoscopy, Urethral Dilation, Possible Bladder Biopsy, Right Retrograde Pyelogram, Possible Ureteroscopy, Possible Biopsy.  70 yo Setswana speaking male from [facility], PMH CAD s/p CABG X 4 2015, CHF (EF 50-54% - Echo 2019), severe spinal stenosis s/p multiple fusions, DM2 on Insulin, BPH s/p TURP 2017, hospitalized in 3/2019 with UTI 2/2 urinary retention, h/o gross hematuria with urinary retention - hospitalized to [place] on 02/01/22 for UTI/pneumonia (16F catheter currently in place, pneumonia resolved, chest x-ray ordered at [facility], currently finishing abx for UTI, repeat urine culture ordered). A CT scan was reportedly performed, the demonstrated "severe right-sided hydronephrosis, and moderate left hydronephrosis with a thickened bladder wall and enlarged prostate, suggestive of bladder outlet obstruction".    Patient is scheduled for Cystoscopy, Urethral Dilation, Possible Bladder Biopsy, Right Retrograde Pyelogram, Possible Ureteroscopy, Possible Biopsy on 05/09/22.     Covid test 05/06/22. Denies current symptoms.  72 yo Amharic speaking male from [facility], PMH CAD s/p CABG X 4 2015, CHF (EF 50-54% - Echo 2019), severe spinal stenosis s/p multiple fusions, DM2 on Insulin, BPH s/p TURP 2017, hospitalized in 3/2019 with UTI 2/2 urinary retention, h/o gross hematuria with urinary retention - hospitalized to [place] on 02/01/22 for UTI/pneumonia (16F catheter currently in place, pneumonia resolved, chest x-ray ordered at [facility], currently finishing abx for UTI, repeat urine culture ordered). A CT scan was reportedly performed, the demonstrated "severe right-sided hydronephrosis, and moderate left hydronephrosis with a thickened bladder wall and enlarged prostate, suggestive of bladder outlet obstruction".    Patient is scheduled for Cystoscopy, Urethral Dilation, Possible Bladder Biopsy, Right Retrograde Pyelogram, Possible Ureteroscopy, Possible Biopsy on 05/09/22.     04/19/22 Addendum: HgA1C 9.5. Will reroute as per Dr. Dial (anesthesia).     Covid test 05/06/22. Denies current symptoms.  70 yo Uzbek speaking male from [facility], PMH CAD s/p CABG X 4 2015, CHF (EF 50-54% - Echo 2019), severe spinal stenosis s/p multiple fusions, DM2 on Insulin, BPH s/p TURP 2017, hospitalized in 3/2019 with UTI 2/2 urinary retention, h/o gross hematuria with urinary retention - hospitalized to [place] on 02/01/22 for UTI/pneumonia (16F catheter currently in place, pneumonia resolved, chest x-ray ordered at [facility], currently finishing abx for UTI, repeat urine culture ordered). A CT scan was reportedly performed, the demonstrated "severe right-sided hydronephrosis, and moderate left hydronephrosis with a thickened bladder wall and enlarged prostate, suggestive of bladder outlet obstruction".    Patient is scheduled for Cystoscopy, Urethral Dilation, Possible Bladder Biopsy, Right Retrograde Pyelogram, Possible Ureteroscopy, Possible Biopsy on 05/09/22.     04/19/22 Addendum: HgA1C 9.5. Will reroute as per Dr. Dial (anesthesia).     Urine culture (cath) positive for > 3 organisms, possible collection contamination. Surgeon notified.     Covid test 05/06/22. Denies current symptoms.  70 yo Romansh speaking male from [facility], PMH CAD s/p CABG X 4 2015, CHF (EF 50-54% - Echo 2019), severe spinal stenosis s/p multiple fusions, DM2 on Insulin, BPH s/p TURP 2017, hospitalized in 3/2019 with UTI 2/2 urinary retention, h/o gross hematuria with urinary retention - hospitalized to [place] on 02/01/22 for UTI/pneumonia (16F catheter currently in place, pneumonia resolved, chest x-ray ordered at [facility], currently finishing abx for UTI, repeat urine culture ordered). A CT scan was reportedly performed, the demonstrated "severe right-sided hydronephrosis, and moderate left hydronephrosis with a thickened bladder wall and enlarged prostate, suggestive of bladder outlet obstruction".    Patient is scheduled for Cystoscopy, Urethral Dilation, Possible Bladder Biopsy, Right Retrograde Pyelogram, Possible Ureteroscopy, Possible Biopsy on 05/09/22.     04/19/22 Addendum: HgA1C 9.5. Will reroute as per Dr. Dial (anesthesia).     Urine culture (cath) positive for > 3 organisms, possible collection contamination. Surgeon notified.     04/20/22 Chest x-ray negative.     Covid test 05/06/22. Denies current symptoms.

## 2022-04-18 NOTE — H&P PST ADULT - FALL HARM RISK - HARM RISK INTERVENTIONS

## 2022-04-18 NOTE — H&P PST ADULT - NSICDXPASTMEDICALHX_GEN_ALL_CORE_FT
PAST MEDICAL HISTORY:  Acute UTI 3/2019 2/2 urinary retention 2/2 BPH    Benign prostatic hypertrophy     Chronic low back pain     Coronary artery disease involving native coronary artery of native heart without angina pectoris     Dyslipidemia     ETOH abuse last drink 2015    Hypertension     LBBB (left bundle branch block) on EKG    Lumbar compression fracture chronic L2 fracture    Lumbar disc disease     Transient cerebral ischemia, unspecified type 10/2015    Type 2 diabetes mellitus PAST MEDICAL HISTORY:  Acute UTI 3/2019, 3/2022 -  2/2 urinary retention 2/2 BPH    Benign prostatic hypertrophy     Bilateral hydronephrosis     Chronic low back pain     Coronary artery disease involving native coronary artery of native heart without angina pectoris     Dyslipidemia     ETOH abuse last drink 2015    H/O neuropathy     Hypertension     LBBB (left bundle branch block) on EKG    Lumbar compression fracture chronic L2 fracture    Lumbar disc disease     Lumbosacral stenosis     Transient cerebral ischemia, unspecified type 10/2015    Type 2 diabetes mellitus on Insulin    Urge incontinence      PAST MEDICAL HISTORY:  Acute UTI 3/2019, 3/2022 -  2/2 urinary retention 2/2 BPH    Benign prostatic hypertrophy     Bilateral hydronephrosis     Chronic low back pain     Coronary artery disease involving native coronary artery of native heart without angina pectoris     Dyslipidemia     ETOH abuse last drink 2015    H/O neuropathy     Hypertension     LBBB (left bundle branch block) on EKG    Lumbar compression fracture chronic L2 fracture    Lumbar disc disease     Lumbosacral stenosis     Stage 3 chronic kidney disease     Transient cerebral ischemia, unspecified type 10/2015    Type 2 diabetes mellitus on Insulin    Urge incontinence

## 2022-04-18 NOTE — H&P PST ADULT - GENERAL GENITOURINARY SYMPTOMS
incontinence/increased urinary frequency/nocturia bladder infections/incontinence/dysuria/increased urinary frequency/nocturia

## 2022-04-18 NOTE — H&P PST ADULT - OTHER CARE PROVIDERS
Dr. Zachery Mcgarry, cardiologist, [phone], Dr. Hamzah Huerta, urology, [phone], Dr. Hiram Soto, endocrinology Dr. Jose Martin Ellington [phone], cardiologist - will make an appt, Dr. Hamzah Huerta, urology, [phone], Dr. Hiram Soto, endocrinology

## 2022-04-18 NOTE — H&P PST ADULT - NSICDXPASTSURGICALHX_GEN_ALL_CORE_FT
PAST SURGICAL HISTORY:  S/P lumbar laminectomy 5/2016    S/P TURP 2017    Status post coronary artery bypass graft x4  10/8/2015 PAST SURGICAL HISTORY:  S/P lumbar laminectomy T11-L4, posteriolateral fusion 5/24/2016, reexploration, L3-S1 posterior fusion 08/07/2019, L4-S1 anterior fusion 09/03/2019    S/P TURP 06/28/2017    Status post coronary artery bypass graft x4  10/8/2015

## 2022-04-18 NOTE — H&P PST ADULT - ASSESSMENT
CAPRINI SCORE [CLOT]    AGE RELATED RISK FACTORS                                                       MOBILITY RELATED FACTORS  [ ] Age 41-60 years                                            (1 Point)                  [ ] Bed rest                                                        (1 Point)  [x ] Age: 61-74 years                                           (2 Points)                 [ ] Plaster cast                                                   (2 Points)  [ ] Age= 75 years                                              (3 Points)                 [ ] Bed bound for more than 72 hours                 (2 Points)    DISEASE RELATED RISK FACTORS                                               GENDER SPECIFIC FACTORS  [ ] Edema in the lower extremities                       (1 Point)                  [ ] Pregnancy                                                     (1 Point)  [ ] Varicose veins                                               (1 Point)                  [ ] Post-partum < 6 weeks                                   (1 Point)             [x ] BMI > 25 Kg/m2                                            (1 Point)                  [ ] Hormonal therapy  or oral contraception          (1 Point)                 [ ] Sepsis (in the previous month)                        (1 Point)                  [ ] History of pregnancy complications                 (1 point)  [ ] Pneumonia or serious lung disease                                               [ ] Unexplained or recurrent                     (1 Point)           (in the previous month)                               (1 Point)  [ ] Abnormal pulmonary function test                     (1 Point)                 SURGERY RELATED RISK FACTORS  [ ] Acute myocardial infarction                              (1 Point)                 [ ]  Section                                             (1 Point)  [ ] Congestive heart failure (in the previous month)  (1 Point)               [ ] Minor surgery                                                  (1 Point)   [ ] Inflammatory bowel disease                             (1 Point)                 [ ] Arthroscopic surgery                                        (2 Points)  [ ] Central venous access                                      (2 Points)                [x ] General surgery lasting more than 45 minutes   (2 Points)       [ ] Stroke (in the previous month)                          (5 Points)               [ ] Elective arthroplasty                                         (5 Points)                                                                                                                                               HEMATOLOGY RELATED FACTORS                                                 TRAUMA RELATED RISK FACTORS  [ ] Prior episodes of VTE                                     (3 Points)                [ ] Fracture of the hip, pelvis, or leg                       (5 Points)  [ ] Positive family history for VTE                         (3 Points)                 [ ] Acute spinal cord injury (in the previous month)  (5 Points)  [ ] Prothrombin 90077 A                                     (3 Points)                 [ ] Paralysis  (less than 1 month)                             (5 Points)  [ ] Factor V Leiden                                             (3 Points)                  [ ] Multiple Trauma within 1 month                        (5 Points)  [ ] Lupus anticoagulants                                     (3 Points)                                                           [ ] Anticardiolipin antibodies                               (3 Points)                                                       [ ] High homocysteine in the blood                      (3 Points)                                             [ ] Other congenital or acquired thrombophilia      (3 Points)                                                [ ] Heparin induced thrombocytopenia                  (3 Points)                                          Total Score [       5   ]    Caprini Score 0 - 2:  Low Risk, No VTE Prophylaxis required for most patients, encourage ambulation  Caprini Score 3 - 6:  At Risk, pharmacologic VTE prophylaxis is indicated for most patients (in the absence of a contraindication)  Caprini Score Greater than or = 7:  High Risk, pharmacologic VTE prophylaxis is indicated for most patients (in the absence of a contraindication)

## 2022-04-19 ENCOUNTER — APPOINTMENT (OUTPATIENT)
Dept: UROLOGY | Facility: CLINIC | Age: 72
End: 2022-04-19
Payer: MEDICARE

## 2022-04-19 VITALS
TEMPERATURE: 97.6 F | RESPIRATION RATE: 14 BRPM | SYSTOLIC BLOOD PRESSURE: 170 MMHG | HEART RATE: 76 BPM | DIASTOLIC BLOOD PRESSURE: 112 MMHG | HEIGHT: 64 IN | WEIGHT: 156 LBS | OXYGEN SATURATION: 97 % | BODY MASS INDEX: 26.63 KG/M2

## 2022-04-19 DIAGNOSIS — R35.0 FREQUENCY OF MICTURITION: ICD-10-CM

## 2022-04-19 LAB
CULTURE RESULTS: SIGNIFICANT CHANGE UP
SPECIMEN SOURCE: SIGNIFICANT CHANGE UP

## 2022-04-19 PROCEDURE — 51701 INSERT BLADDER CATHETER: CPT

## 2022-04-21 LAB — BACTERIA UR CULT: NORMAL

## 2022-04-26 ENCOUNTER — APPOINTMENT (OUTPATIENT)
Dept: UROLOGY | Facility: CLINIC | Age: 72
End: 2022-04-26
Payer: MEDICARE

## 2022-04-26 PROBLEM — N39.41 URGE INCONTINENCE: Chronic | Status: ACTIVE | Noted: 2022-04-18

## 2022-04-26 PROBLEM — Z86.69 PERSONAL HISTORY OF OTHER DISEASES OF THE NERVOUS SYSTEM AND SENSE ORGANS: Chronic | Status: ACTIVE | Noted: 2022-04-18

## 2022-04-26 PROBLEM — N39.0 URINARY TRACT INFECTION, SITE NOT SPECIFIED: Chronic | Status: ACTIVE | Noted: 2019-08-13

## 2022-04-26 PROBLEM — N13.30 UNSPECIFIED HYDRONEPHROSIS: Chronic | Status: ACTIVE | Noted: 2022-04-18

## 2022-04-26 PROBLEM — N18.30 CHRONIC KIDNEY DISEASE, STAGE 3 UNSPECIFIED: Chronic | Status: ACTIVE | Noted: 2022-04-18

## 2022-04-26 PROBLEM — M48.07 SPINAL STENOSIS, LUMBOSACRAL REGION: Chronic | Status: ACTIVE | Noted: 2022-04-18

## 2022-04-26 PROCEDURE — A4216: CPT | Mod: NC

## 2022-04-26 PROCEDURE — 99212 OFFICE O/P EST SF 10 MIN: CPT | Mod: 25

## 2022-04-26 PROCEDURE — 51700 IRRIGATION OF BLADDER: CPT

## 2022-04-26 RX ORDER — FLUCONAZOLE 100 MG/1
100 TABLET ORAL DAILY
Qty: 3 | Refills: 0 | Status: ACTIVE | COMMUNITY
Start: 2022-04-26 | End: 1900-01-01

## 2022-04-26 NOTE — PHYSICAL EXAM
[General Appearance - Well Developed] : well developed [Skin Color & Pigmentation] : normal skin color and pigmentation [Edema] : no peripheral edema [FreeTextEntry1] : suprapubic fullness. Catheter with scant urine in bag

## 2022-04-26 NOTE — ASSESSMENT
[FreeTextEntry1] : BRITANY JORGE is a 71 year M with a history of of lumbar radiculitis status post laminectomy, type 2 diabetes, COPD, CHF, MI, CKD stage III (b/l 1.6 - 2.1), and BPH s/p TURP 2017 who presents with a clogged catheter\par \par The catheter was irrigated with normal saline and some debris was removed.  This immediately began to drain 700 mL of yellow urine.  The catheter was confirmed to be in the bladder on ultrasound.  He was instructed on how to perform catheter flushing prn for obstruction.\par - Follow-up as scheduled for surgery on May 9\par - Augmentin / diflucan to start 3 days before surgery

## 2022-04-26 NOTE — HISTORY OF PRESENT ILLNESS
[FreeTextEntry1] : Mr Mtz is a  71 y.o. M who presents as an urgent visit.\par \par See note 4/5/2022 for full history. He is currently managed with a Peters catheter.  He called in today to report that his catheter is not draining well, with leakage around the catheter and suprapubic pain.

## 2022-05-06 ENCOUNTER — OUTPATIENT (OUTPATIENT)
Dept: OUTPATIENT SERVICES | Facility: HOSPITAL | Age: 72
LOS: 1 days | End: 2022-05-06
Payer: COMMERCIAL

## 2022-05-06 DIAGNOSIS — Z90.79 ACQUIRED ABSENCE OF OTHER GENITAL ORGAN(S): Chronic | ICD-10-CM

## 2022-05-06 DIAGNOSIS — Z11.52 ENCOUNTER FOR SCREENING FOR COVID-19: ICD-10-CM

## 2022-05-06 DIAGNOSIS — Z95.1 PRESENCE OF AORTOCORONARY BYPASS GRAFT: Chronic | ICD-10-CM

## 2022-05-06 DIAGNOSIS — Z98.890 OTHER SPECIFIED POSTPROCEDURAL STATES: Chronic | ICD-10-CM

## 2022-05-06 LAB — SARS-COV-2 RNA SPEC QL NAA+PROBE: SIGNIFICANT CHANGE UP

## 2022-05-06 PROCEDURE — C9803: CPT

## 2022-05-06 PROCEDURE — U0005: CPT

## 2022-05-06 PROCEDURE — U0003: CPT

## 2022-05-09 ENCOUNTER — APPOINTMENT (OUTPATIENT)
Dept: UROLOGY | Facility: HOSPITAL | Age: 72
End: 2022-05-09

## 2022-05-13 ENCOUNTER — OUTPATIENT (OUTPATIENT)
Dept: OUTPATIENT SERVICES | Facility: HOSPITAL | Age: 72
LOS: 1 days | End: 2022-05-13
Payer: COMMERCIAL

## 2022-05-13 DIAGNOSIS — Z90.79 ACQUIRED ABSENCE OF OTHER GENITAL ORGAN(S): Chronic | ICD-10-CM

## 2022-05-13 DIAGNOSIS — Z95.1 PRESENCE OF AORTOCORONARY BYPASS GRAFT: Chronic | ICD-10-CM

## 2022-05-13 DIAGNOSIS — Z98.890 OTHER SPECIFIED POSTPROCEDURAL STATES: Chronic | ICD-10-CM

## 2022-05-13 DIAGNOSIS — Z11.52 ENCOUNTER FOR SCREENING FOR COVID-19: ICD-10-CM

## 2022-05-13 PROCEDURE — U0005: CPT

## 2022-05-13 PROCEDURE — C9803: CPT

## 2022-05-13 PROCEDURE — U0003: CPT

## 2022-05-14 LAB — SARS-COV-2 RNA SPEC QL NAA+PROBE: SIGNIFICANT CHANGE UP

## 2022-05-15 ENCOUNTER — TRANSCRIPTION ENCOUNTER (OUTPATIENT)
Age: 72
End: 2022-05-15

## 2022-05-16 ENCOUNTER — APPOINTMENT (OUTPATIENT)
Dept: UROLOGY | Facility: HOSPITAL | Age: 72
End: 2022-05-16

## 2022-05-16 ENCOUNTER — RESULT REVIEW (OUTPATIENT)
Age: 72
End: 2022-05-16

## 2022-05-16 ENCOUNTER — TRANSCRIPTION ENCOUNTER (OUTPATIENT)
Age: 72
End: 2022-05-16

## 2022-05-16 ENCOUNTER — OUTPATIENT (OUTPATIENT)
Dept: INPATIENT UNIT | Facility: HOSPITAL | Age: 72
LOS: 1 days | End: 2022-05-16
Payer: COMMERCIAL

## 2022-05-16 VITALS
OXYGEN SATURATION: 99 % | HEART RATE: 72 BPM | RESPIRATION RATE: 16 BRPM | TEMPERATURE: 98 F | DIASTOLIC BLOOD PRESSURE: 89 MMHG | SYSTOLIC BLOOD PRESSURE: 158 MMHG

## 2022-05-16 VITALS
SYSTOLIC BLOOD PRESSURE: 191 MMHG | TEMPERATURE: 98 F | WEIGHT: 160.06 LBS | HEIGHT: 64 IN | OXYGEN SATURATION: 97 % | HEART RATE: 83 BPM | RESPIRATION RATE: 18 BRPM | DIASTOLIC BLOOD PRESSURE: 114 MMHG

## 2022-05-16 DIAGNOSIS — Z98.890 OTHER SPECIFIED POSTPROCEDURAL STATES: Chronic | ICD-10-CM

## 2022-05-16 DIAGNOSIS — Z90.79 ACQUIRED ABSENCE OF OTHER GENITAL ORGAN(S): Chronic | ICD-10-CM

## 2022-05-16 DIAGNOSIS — N39.41 URGE INCONTINENCE: ICD-10-CM

## 2022-05-16 DIAGNOSIS — Z95.1 PRESENCE OF AORTOCORONARY BYPASS GRAFT: Chronic | ICD-10-CM

## 2022-05-16 DIAGNOSIS — R35.0 FREQUENCY OF MICTURITION: ICD-10-CM

## 2022-05-16 LAB
GLUCOSE BLDC GLUCOMTR-MCNC: 149 MG/DL — HIGH (ref 70–99)
GLUCOSE BLDC GLUCOMTR-MCNC: 272 MG/DL — HIGH (ref 70–99)

## 2022-05-16 PROCEDURE — 74420 UROGRAPHY RTRGR +-KUB: CPT | Mod: 26,RT

## 2022-05-16 PROCEDURE — 52234 CYSTOSCOPY AND TREATMENT: CPT

## 2022-05-16 PROCEDURE — 82962 GLUCOSE BLOOD TEST: CPT

## 2022-05-16 PROCEDURE — 88305 TISSUE EXAM BY PATHOLOGIST: CPT

## 2022-05-16 PROCEDURE — 76000 FLUOROSCOPY <1 HR PHYS/QHP: CPT

## 2022-05-16 PROCEDURE — C1758: CPT

## 2022-05-16 PROCEDURE — C9399: CPT

## 2022-05-16 PROCEDURE — 88305 TISSUE EXAM BY PATHOLOGIST: CPT | Mod: 26

## 2022-05-16 PROCEDURE — 52204 CYSTOSCOPY W/BIOPSY(S): CPT

## 2022-05-16 PROCEDURE — C1769: CPT

## 2022-05-16 DEVICE — URETERAL CATH OPEN END 5FR 70CM: Type: IMPLANTABLE DEVICE | Site: RIGHT | Status: FUNCTIONAL

## 2022-05-16 DEVICE — GUIDEWIRE SENSOR DUAL-FLEX NITINOL STRAIGHT .035" X 150CM: Type: IMPLANTABLE DEVICE | Site: RIGHT | Status: FUNCTIONAL

## 2022-05-16 RX ORDER — TAMSULOSIN HYDROCHLORIDE 0.4 MG/1
1 CAPSULE ORAL
Qty: 0 | Refills: 0 | DISCHARGE

## 2022-05-16 RX ORDER — INSULIN ASPART 100 [IU]/ML
45 INJECTION, SOLUTION SUBCUTANEOUS
Qty: 0 | Refills: 0 | DISCHARGE

## 2022-05-16 RX ORDER — FLUTICASONE PROPIONATE 50 MCG
1 SPRAY, SUSPENSION NASAL
Qty: 0 | Refills: 0 | DISCHARGE

## 2022-05-16 RX ORDER — POLYETHYLENE GLYCOL 3350 17 G/17G
0 POWDER, FOR SOLUTION ORAL
Qty: 0 | Refills: 0 | DISCHARGE

## 2022-05-16 RX ORDER — LOSARTAN POTASSIUM 100 MG/1
100 TABLET, FILM COATED ORAL DAILY
Refills: 0 | Status: DISCONTINUED | OUTPATIENT
Start: 2022-05-16 | End: 2022-05-30

## 2022-05-16 RX ORDER — HYDROMORPHONE HYDROCHLORIDE 2 MG/ML
0.25 INJECTION INTRAMUSCULAR; INTRAVENOUS; SUBCUTANEOUS
Refills: 0 | Status: DISCONTINUED | OUTPATIENT
Start: 2022-05-16 | End: 2022-05-16

## 2022-05-16 RX ORDER — LOSARTAN POTASSIUM 100 MG/1
1 TABLET, FILM COATED ORAL
Qty: 0 | Refills: 0 | DISCHARGE

## 2022-05-16 RX ORDER — TRAMADOL HYDROCHLORIDE 50 MG/1
1 TABLET ORAL
Qty: 0 | Refills: 0 | DISCHARGE

## 2022-05-16 RX ORDER — INSULIN GLARGINE 100 [IU]/ML
20 INJECTION, SOLUTION SUBCUTANEOUS
Qty: 0 | Refills: 0 | DISCHARGE

## 2022-05-16 RX ORDER — MELOXICAM 15 MG/1
1 TABLET ORAL
Qty: 0 | Refills: 0 | DISCHARGE

## 2022-05-16 RX ORDER — ONDANSETRON 8 MG/1
4 TABLET, FILM COATED ORAL ONCE
Refills: 0 | Status: DISCONTINUED | OUTPATIENT
Start: 2022-05-16 | End: 2022-05-16

## 2022-05-16 RX ORDER — FUROSEMIDE 40 MG
1 TABLET ORAL
Qty: 0 | Refills: 0 | DISCHARGE

## 2022-05-16 RX ORDER — SODIUM CHLORIDE 9 MG/ML
1000 INJECTION, SOLUTION INTRAVENOUS
Refills: 0 | Status: DISCONTINUED | OUTPATIENT
Start: 2022-05-16 | End: 2022-05-30

## 2022-05-16 RX ORDER — FINASTERIDE 5 MG/1
1 TABLET, FILM COATED ORAL
Qty: 0 | Refills: 0 | DISCHARGE

## 2022-05-16 RX ADMIN — LOSARTAN POTASSIUM 100 MILLIGRAM(S): 100 TABLET, FILM COATED ORAL at 10:48

## 2022-05-16 NOTE — ASU DISCHARGE PLAN (ADULT/PEDIATRIC) - CALL YOUR DOCTOR IF YOU HAVE ANY OF THE FOLLOWING:
Bleeding that does not stop/Pain not relieved by Medications/Fever greater than (need to indicate Fahrenheit or Celsius)/Numbness, tingling, color or temperature change to extremity after valdes removed on thursday/Bleeding that does not stop/Pain not relieved by Medications/Fever greater than (need to indicate Fahrenheit or Celsius)/Numbness, tingling, color or temperature change to extremity/Unable to urinate

## 2022-05-16 NOTE — ASU DISCHARGE PLAN (ADULT/PEDIATRIC) - ASU DC SPECIAL INSTRUCTIONSFT
you have a catheter. I will see you in the office on Thursday to remove this you have a catheter. I will see you in the office on Thursday to remove catheter

## 2022-05-16 NOTE — PRE-OP CHECKLIST - ANTIBIOTIC
05/03/20  0400 05/04/20  0438 05/05/20  0300   * 132* 134*   K 4.6 4.8 4.5   CL 95* 93* 96*   CO2 33* 30 30   BUN 16 17 17   CREATININE 0.9 0.8 0.7     LIVER PROFILE:   Recent Labs     05/03/20  0400 05/04/20  0438 05/05/20  0300   AST 23 25 25   ALT 19 19 19   BILITOT 0.4 0.5 0.6   ALKPHOS 121 125 113     PT/INR: No results for input(s): PROTIME, INR in the last 72 hours. APTT: No results for input(s): APTT in the last 72 hours. BNP:  No results for input(s): BNP in the last 72 hours.       Assessment:  Patient Active Problem List    Diagnosis Date Noted    Presence of temporary transvenous cardiac pacemaker 05/01/2020    Acute on chronic systolic heart failure ([address] 31/59/5023    Acute systolic (congestive) heart failure ([address] 11/08/2019    Acute metabolic encephalopathy 73/81/1880    Shock liver 11/08/2019    Aspiration pneumonia ([address] 11/08/2019    Acute MI ([address] 11/07/2019    ST elevation MI (STEMI) ([address] 11/06/2019    STEMI (ST elevation myocardial infarction) ([address] 11/06/2019    Pneumonia of left lower lobe due to infectious organism [facility])     Aortic valve stenosis     Type 2 diabetes mellitus without complication, without long-term current use of insulin ([address]     Moderate malnutrition ([address] 09/13/2019    Atrial flutter ([address] 09/12/2019       Electronically signed by Thalia King PA-C on 5/5/2020 at 10:23 AM yes

## 2022-05-16 NOTE — ASU DISCHARGE PLAN (ADULT/PEDIATRIC) - NURSING INSTRUCTIONS
OK to take Tylenol/Acetaminophen at _2:30 PM _____ for pain and every 6 hours after as needed. OK to take Motrin/Ibuprofen at _____ for pain and every 6 hours after as needed.

## 2022-05-16 NOTE — PRE-ANESTHESIA EVALUATION ADULT - NSANTHADDINFOFT_GEN_ALL_CORE
Medical clearance/evaluation (Dr. FARRAH Rico) and cardiology clearance / evaluation (Dr. POP Ellington) read and appreciated. Patient took none of his medications as asked this AM. FS = 272. Will proceed with procedure and treat / monitor BS and BP perioperastively. Repeat Hgb A1C is 8.7 down from 9.5 per PMD. Dr. Rubio wishes to proceed with planned procedure.

## 2022-05-18 LAB — SURGICAL PATHOLOGY STUDY: SIGNIFICANT CHANGE UP

## 2022-05-19 ENCOUNTER — APPOINTMENT (OUTPATIENT)
Dept: UROLOGY | Facility: CLINIC | Age: 72
End: 2022-05-19
Payer: MEDICARE

## 2022-05-19 VITALS
OXYGEN SATURATION: 98 % | DIASTOLIC BLOOD PRESSURE: 87 MMHG | SYSTOLIC BLOOD PRESSURE: 180 MMHG | HEART RATE: 84 BPM | TEMPERATURE: 97.8 F | RESPIRATION RATE: 16 BRPM

## 2022-05-19 PROCEDURE — 99212 OFFICE O/P EST SF 10 MIN: CPT

## 2022-05-19 NOTE — PHYSICAL EXAM
[Normal Appearance] : normal appearance [Well Groomed] : well groomed [Abdomen Tenderness] : non-tender [Costovertebral Angle Tenderness] : no ~M costovertebral angle tenderness [Urethral Meatus] : meatus normal [Penis Abnormality] : normal uncircumcised penis [FreeTextEntry1] : Uncircumcised penis, Peters catheter draining yellow urine. [] : no rash [Edema] : no peripheral edema [Exaggerated Use Of Accessory Muscles For Inspiration] : no accessory muscle use

## 2022-05-19 NOTE — ASSESSMENT
[FreeTextEntry1] : BRITANY JORGE is a 71 year M with a history of of lumbar radiculitis status post laminectomy, type 2 diabetes, COPD, CHF, MI, CKD stage III (b/l 1.6 - 2.1), and BPH s/p TURP 2017, and then s/p cystoscopy, bladder biopsy, right retrograde pyelogram on 5/16/2022\par -Peters catheter removed\par -Follow-up 3 weeks for PVR, bedside renal ultrasound

## 2022-05-19 NOTE — HISTORY OF PRESENT ILLNESS
[FreeTextEntry1] : Mr Mtz is a  71 y.o. M who presents for follow-up.\par \par To review, he was seen by me on Feb 1 after being seen with gross hematuria in the [place] ED. A CT scan was reportedly performed, the demonstrated "severe right-sided hydronephrosis, and moderate left hydronephrosis with a thickened bladder wall and enlarged prostate, suggestive of bladder outlet obstruction". I performed an office cystoscopy which revealed a stricture. He was subsequently admitted to [facility] for UTI / pneumonia. We do not have full records at this time, but he tells me a catheter was placed "with difficulty" which required "stretching" while he was awake. He has a 16F [place]-tipped catheter currently in place. \par \par He previously followed with Dr. Jacobsen, and was last seen in 2018. He has a history of BPH and urinary retention s/p TURP 6/28/17 by Dr. Jacobsen. Following surgery, he continued to have elevated PVRs and some LUTS. \par UDS 2/2018: QMax 8 -  mL, flow pattern staccato. No DO. Capacity 338 mL, normal sensation, possibly valsalva-voiding. \par \par He went to the OR on May 16 for cystoscopy, bladder biopsy, right retrograde pyelogram. He was not obstructed on the right. His urethra was open. A bladder biopsy was performed which was benign. He returns today with his valdes catheter in place.

## 2022-06-06 ENCOUNTER — APPOINTMENT (OUTPATIENT)
Dept: ORTHOPEDIC SURGERY | Facility: CLINIC | Age: 72
End: 2022-06-06
Payer: MEDICARE

## 2022-06-06 VITALS — HEIGHT: 64 IN | WEIGHT: 156 LBS | BODY MASS INDEX: 26.63 KG/M2

## 2022-06-06 DIAGNOSIS — M40.35 FLATBACK SYNDROME, THORACOLUMBAR REGION: ICD-10-CM

## 2022-06-06 DIAGNOSIS — M51.36 OTHER INTERVERTEBRAL DISC DEGENERATION, LUMBAR REGION: ICD-10-CM

## 2022-06-06 PROCEDURE — 99214 OFFICE O/P EST MOD 30 MIN: CPT

## 2022-06-08 NOTE — PRE-OP CHECKLIST - NSBLOODTRANS_GEN_A_CORE_SIUH
Pt was discharged and given instructions by MD. Pt verbalized good understanding of all discharge instructions,prescriptions and F/U care. All questions answered. Pt in stable condition on discharge.
no...

## 2022-06-17 ENCOUNTER — APPOINTMENT (OUTPATIENT)
Dept: UROLOGY | Facility: CLINIC | Age: 72
End: 2022-06-17

## 2022-07-08 ENCOUNTER — APPOINTMENT (OUTPATIENT)
Dept: MRI IMAGING | Facility: CLINIC | Age: 72
End: 2022-07-08

## 2022-07-08 ENCOUNTER — OUTPATIENT (OUTPATIENT)
Dept: OUTPATIENT SERVICES | Facility: HOSPITAL | Age: 72
LOS: 1 days | End: 2022-07-08
Payer: COMMERCIAL

## 2022-07-08 DIAGNOSIS — M40.35 FLATBACK SYNDROME, THORACOLUMBAR REGION: ICD-10-CM

## 2022-07-08 DIAGNOSIS — M51.36 OTHER INTERVERTEBRAL DISC DEGENERATION, LUMBAR REGION: ICD-10-CM

## 2022-07-08 DIAGNOSIS — Z98.890 OTHER SPECIFIED POSTPROCEDURAL STATES: Chronic | ICD-10-CM

## 2022-07-08 DIAGNOSIS — Z90.79 ACQUIRED ABSENCE OF OTHER GENITAL ORGAN(S): Chronic | ICD-10-CM

## 2022-07-08 DIAGNOSIS — Z95.1 PRESENCE OF AORTOCORONARY BYPASS GRAFT: Chronic | ICD-10-CM

## 2022-07-08 PROCEDURE — 72158 MRI LUMBAR SPINE W/O & W/DYE: CPT | Mod: 26

## 2022-07-08 PROCEDURE — A9585: CPT

## 2022-07-08 PROCEDURE — 72157 MRI CHEST SPINE W/O & W/DYE: CPT | Mod: 26

## 2022-07-08 PROCEDURE — 72157 MRI CHEST SPINE W/O & W/DYE: CPT

## 2022-07-08 PROCEDURE — 72158 MRI LUMBAR SPINE W/O & W/DYE: CPT

## 2022-07-18 ENCOUNTER — APPOINTMENT (OUTPATIENT)
Dept: ORTHOPEDIC SURGERY | Facility: CLINIC | Age: 72
End: 2022-07-18

## 2022-07-18 VITALS — HEIGHT: 64 IN | BODY MASS INDEX: 26.63 KG/M2 | WEIGHT: 156 LBS

## 2022-07-18 DIAGNOSIS — M54.16 RADICULOPATHY, LUMBAR REGION: ICD-10-CM

## 2022-07-18 PROCEDURE — 99214 OFFICE O/P EST MOD 30 MIN: CPT

## 2022-08-19 ENCOUNTER — NON-APPOINTMENT (OUTPATIENT)
Age: 72
End: 2022-08-19

## 2022-08-26 NOTE — PATIENT PROFILE ADULT - NSPRESCRALCFREQ_GEN_A_NUR
[facility] Hospital Medicine Discharge Summary    Admit Date: 7/30/2022  Discharge Date and Time: 8/26/20221:20 PM  Admitting Physician:  Team  Discharging Physician: Dawood Sanchez MD.  Primary Care Physician: Bronwyn Corea MD  Consults: Cardiology, Cardiothoracic/Vascular Surgery, Hospital Medicine, Infectious Disease, Nephrology and Pulmonary/Intensive care    Discharge Diagnoses:  acute renal failure on CKD IV requiring HD   Bilateral community-acquired pneumonia S/p Rx   Acute hypoxemic respiratory failure  required intubation/extubation   Symptomatic bradycardia-status post transvenous pacemaker   New onset atrial fibrillation with RVR   Acute metabolic encephalopathy   T7 and T9 anterior wedge compression fracture with moderate to severe canal stenosis   Recent COVID-19 infection  Junctional rhythm/bradycardia-temp pacer removed  Probable underlying pulmonary fibrosis  Anemia of chronic disease on Procrit  History of: Heart failure preserved ejection fraction, COPD, hypertension, renal dysplasia status post right nephrectomy, solitary left kidney   Acute symptomatic anemia requiring prbc      Plan:   - HD as per schedule /[facility] in [facility]  -completed antibiotics. Afebrile.   -temporary pacemaker removed 08/17/2022.  - transfer to nursing home [place] [facility] Course:   79 y.o. female with history of HFpEF, COPD, hypertension, renal dysplasia s/p right nephrectomy, solitary L kidney who presented to [facility] on 7/30/22 with complaints of worsening shortness of breath and thoracic back pain. Was previously admitted 7/21/22 for acute hypoxemic respiratory failure and CHF exacerbation and discharged on 7/26 with home oxygen. She was admitted for management of CHF exacerbation w/ acute hypoxic respiratory failure, new onset a fib with RVR, MARLINE . CXR with pulmonary vascular congestion and cardiac decompensation. Cardiology consulted. CT of thoracic spine revealed  changes of the T7 through T9 vertebral bodies with slightly increased anterior height loss of the T7 vertebral body since 07/23/2022 and 2-3 mm of retropulsion, continued small bilateral pleural effusions. Echo revealed EF of 63%, severe left atrial enlargement and moderate to severe MR. ID consulted on 8/3, pt started on levaquin- stopped on 8/6. Nephrology consulted on 8/7 for MARLINE. Neurosurgery consulted on 8/7- ordered [place] brace and to monitor pain and imaging closely. Dialysis catheter place on 8/8 and initiated on HD. 8/8 Patient became bradycardic and hypoxic, transferred to ICU and was intubated on arrival. CT head negative. Blood and respiratory cultures obtained. Transvenous pacer placed. Extubated on 8/10. Heart rate stabilized and cardiology took for ex-plant of temporary transvenous pacer on 8/17. Unfortunately she has not shown any signs of renal recovery and vascular surgery consulted on 8/18 for tunnel cath placement which was performed on 8/19. Currently awaiting placement.    Pt was seen and examined on the day of discharge  Vitals:  VITAL SIGNS: 24 HRS MIN & MAX LAST   Temp  Min: 97.6 °F (36.4 °C)  Max: 98.4 °F (36.9 °C) 98.1 °F (36.7 °C)   BP  Min: 124/68  Max: 145/75 132/76   Pulse  Min: 72  Max: 95  75   Resp  Min: 16  Max: 20 18   SpO2  Min: 97 %  Max: 100 % 100 %       Physical Exam:  General: In no acute distress, afebrile   Respiratory: Clear to auscultation bilaterally   Cardiovascular: S1, S2, no appreciable murmur   Abdomen: Soft, nontender, BS +   Skin: Right chest wall catheter   Neurologic: Alert and oriented x4, moving all extremities with good strength       Procedures Performed: No admission procedures for hospital encounter.     Significant Diagnostic Studies: See Full reports for all details    Recent Labs   Lab 08/22/22  0218 08/23/22  0450 08/24/22  0450   WBC 9.7 8.2 7.7   RBC 2.26* 3.61* 3.45*   HGB 6.7* 10.7* 10.2*   HCT 21.7* 33.0* 33.4*   MCV 96.0* 91.4 96.8*   MCH 29.6  29.6 29.6   MCHC 30.9* 32.4* 30.5*   RDW 15.3 16.2 17.5*    185 201   MPV 11.0* 10.7* 10.7*       Recent Labs   Lab 08/20/22  0204 08/22/22  0218 08/23/22  0450 08/24/22  0450 08/25/22  0418   * 129* 131* 136 135*   K 3.5 4.3 4.3 4.3 5.0   CO2 26 26 24 20* 21*   BUN 71.8* 81.0* 55.4* 53.6* 75.5*   CREATININE 4.99* 4.68* 3.34* 3.14* 4.50*   CALCIUM 8.5 8.0* 7.9* 8.1* 8.2*   MG  --   --   --  2.20 2.40   ALBUMIN 2.5* 2.0*  --   --   --    ALKPHOS 100 128  --   --   --    ALT 12 6  --   --   --    AST 18 20  --   --   --    BILITOT 0.4 0.3  --   --   --         Microbiology Results (last 7 days)     ** No results found for the last 168 hours. **           Electrophysiology Procedure  · Successful removal of RV active fixation lead.     I certify that I was present for the critical steps of the procedure   including the diagnostic, surgical and/or interventional portions.     Procedure Log documented by Documenter: Selene Restrepo and verified by   Peter Angeles MD.    Date: 8/23/2022  Time: 11:40 AM         Medication List      ASK your doctor about these medications    alendronate 70 MG tablet  Commonly known as: FOSAMAX     ALPRAZolam 0.25 MG tablet  Commonly known as: XANAX     amLODIPine 10 MG tablet  Commonly known as: NORVASC     atorvastatin 40 MG tablet  Commonly known as: LIPITOR     buPROPion 150 MG TB24 tablet  Commonly known as: WELLBUTRIN XL     calcium carbonate 600 mg calcium (1,500 mg) Tab  Commonly known as: OS-RALPH     cholecalciferol (vitamin D3) 50 mcg (2,000 unit) Cap capsule  Commonly known as: VITAMIN D3     cyclobenzaprine 5 MG tablet  Commonly known as: FLEXERIL  Take 1 tablet (5 mg total) by mouth 3 (three) times daily as needed for Muscle spasms.  Ask about: Should I take this medication?     fish oil-omega-3 fatty acids 300-1,000 mg capsule     fluticasone 27.5 mcg/actuation nasal spray  Commonly known as: VERAMYST     fluticasone-salmeterol 250-50 mcg/dose 250-50  mcg/dose diskus inhaler  Commonly known as: ADVAIR     furosemide 40 MG tablet  Commonly known as: LASIX  Take 1 tablet (40 mg total) by mouth 2 (two) times daily. Take in the morning after breakfast and at 2 pm     hydrALAZINE 25 MG tablet  Commonly known as: APRESOLINE  Take 1 tablet (25 mg total) by mouth every 8 (eight) hours.     metoprolol succinate 25 MG 24 hr tablet  Commonly known as: TOPROL-XL  Take 1 tablet (25 mg total) by mouth once daily. for 7 days     metoprolol tartrate 25 MG tablet  Commonly known as: LOPRESSOR  Take 1 tablet (25 mg total) by mouth 2 (two) times daily.     predniSONE 20 MG tablet  Commonly known as: DELTASONE  Take 1 tablet (20 mg total) by mouth once daily. for 5 days  Ask about: Should I take this medication?     sodium chloride 1 gram tablet  Take 1 tablet (1 g total) by mouth 2 (two) times daily.     valsartan 320 MG tablet  Commonly known as: DIOVAN             Explained in detail to the patient about the discharge plan, medications, and follow-up visits. Pt understands and agrees with the treatment plan  Discharge Disposition: Home or Self Care   Discharged Condition: stable  Diet-   Dietary Orders (From admission, onward)     Start     Ordered    08/19/22 1808  DIET RENAL ON DIALYSIS Fluid - 1200mL  Diet effective now        Question:  Fluid restriction:  Answer:  Fluid - 1200mL    08/19/22 1808    08/19/22 1016  Dietary nutrition supplements Novasource Renal Vanilla; TID  Continuous        Question Answer Comment   Select PO Supplement: Novasource Renal Vanilla    Frequency: TID        08/19/22 1016               Medications Per DC med rec  Activities as tolerated   Follow-up Information     Juancho Melara MD Follow up on 9/14/2022.    Specialty: Cardiology  Why: @9am  F/U in [facility]  [phone]  Contact information:  [address] [facility] [address]  [phone]             [facility] Follow up.    Specialties: Home Health Services, Home  Therapy Services, Home Living Aide Services  Contact information:  [address], [address]  [facility] [address]  [phone]           Bronwyn Corea MD Follow up on 9/1/2022.    Specialty: Family Medicine  Why: @ 9:30am  Contact information:  [address]  [address]  [facility] [address]  [phone]             [facility] in [place] Follow up.    Why: Dialysis days: Monday, Wednesday, Friday  You will be followed by renal MD, Dr. Juana Roberts MD Follow up.    Specialty: Neurosurgery  Why: As needed if you should have any problems; thoracic fractures  Contact information:  [address] [place] [address]  [address]  [facility] [address] [phone]                       For further questions contact hospitalist office    Discharge time 33 minutes    For worsening symptoms, chest pain, shortness of breath, increased abdominal pain, high grade fever, stroke or stroke like symptoms, immediately go to the nearest Emergency Room or call 911 as soon as possible.      Dawood Burleson M.D, on 8/26/2022. at 1:20 PM.         Never

## 2022-08-31 ENCOUNTER — APPOINTMENT (OUTPATIENT)
Dept: UROLOGY | Facility: CLINIC | Age: 72
End: 2022-08-31

## 2022-08-31 VITALS
RESPIRATION RATE: 16 BRPM | OXYGEN SATURATION: 100 % | HEART RATE: 85 BPM | TEMPERATURE: 98.5 F | DIASTOLIC BLOOD PRESSURE: 76 MMHG | SYSTOLIC BLOOD PRESSURE: 128 MMHG

## 2022-08-31 PROCEDURE — 99213 OFFICE O/P EST LOW 20 MIN: CPT

## 2022-08-31 NOTE — ASSESSMENT
[FreeTextEntry1] : BRITANY JORGE is a 71 year M with a history of of lumbar radiculitis status post laminectomy, type 2 diabetes, COPD, CHF, MI, CKD stage III (b/l 1.6 - 2.1), and BPH s/p TURP 2017, s/p cystoscopy with bladder biopsy. His valdes was removed following surgery but he again developed urinary retention with ESTEVAN / bilateral hydronephrosis catheter placement over a wire as he was found to have urethral strictures.\par \par We discussed that at this point, I am hesitant to remove his Valdes catheter, given that he ended up in retention again with bilateral hydronephrosis.  We discussed options going forward.  We discussed changing his catheter today and managing him with serial catheter exchanges.  Also discussed CIC. He would prefer catheter exchanges\par - 16F coude sterilely exchanged today\par - Renal US\par - Discussed utility of repeat UDS (had performed in 2018). Given recent hospitalization, newly diagnosed cardiac failure - unlikely to perform any procedures in the short term and we will manage with catheter alone for now.\par - RV 1 month for catheter exchange

## 2022-08-31 NOTE — PHYSICAL EXAM
[Normal Appearance] : normal appearance [Abdomen Tenderness] : non-tender [Costovertebral Angle Tenderness] : no ~M costovertebral angle tenderness [Urethral Meatus] : meatus normal [Penis Abnormality] : normal uncircumcised penis [Edema] : no peripheral edema [Exaggerated Use Of Accessory Muscles For Inspiration] : no accessory muscle use [FreeTextEntry1] : wearing life vest

## 2022-08-31 NOTE — HISTORY OF PRESENT ILLNESS
[FreeTextEntry1] : Mr Mtz is a  71 y.o. M who presents for follow-up.\par \par To review, he was seen by me on Feb 1 after being seen with gross hematuria in the [place] ED. A CT scan was reportedly performed, the demonstrated "severe right-sided hydronephrosis, and moderate left hydronephrosis with a thickened bladder wall and enlarged prostate, suggestive of bladder outlet obstruction". I performed an office cystoscopy which revealed a stricture. He was subsequently admitted to [facility] for UTI / pneumonia. We do not have full records at this time, but he tells me a catheter was placed "with difficulty" which required "stretching."\par \par He previously followed with Dr. Jacobsen, and was last seen in 2018. He has a history of BPH and urinary retention s/p TURP 6/28/17 by Dr. Jacobsen. Following surgery, he continued to have elevated PVRs and some LUTS. \par UDS 2/2018: QMax 8 -  mL, flow pattern staccato. No DO. Capacity 338 mL, normal sensation, possibly valsalva-voiding. \par \par He went to the OR on May 16 for cystoscopy, bladder biopsy, right retrograde pyelogram. He was not obstructed on the right. His urethra was open. A bladder biopsy was performed which was benign. His catheter was removed May 16.\par \par He was recently admitted to [facility] from 8/6 - 8/18 with bilateral leg pain and difficulty urinating. He required valdes placement via cystoscopy (reportedly had narrowing in penile and bulbar urethra - catheter placed over a wire). He was found to have an ESTEVAN and a CT scan demonstrating bilateral hydronephrosis, which improved on follow-up imaging (right significantly improved, left resolved). He was also found to have heart failure and is wearing a life vest.

## 2022-09-07 NOTE — DISCHARGE NOTE PROVIDER - NSDCCPCAREPLAN_GEN_ALL_CORE_FT
PRINCIPAL DISCHARGE DIAGNOSIS  Diagnosis: Lumbar spinal stenosis  Assessment and Plan of Treatment:
0

## 2022-09-28 ENCOUNTER — APPOINTMENT (OUTPATIENT)
Dept: UROLOGY | Facility: CLINIC | Age: 72
End: 2022-09-28

## 2022-10-05 ENCOUNTER — APPOINTMENT (OUTPATIENT)
Dept: UROLOGY | Facility: CLINIC | Age: 72
End: 2022-10-05

## 2022-10-05 VITALS
TEMPERATURE: 97.8 F | HEART RATE: 85 BPM | OXYGEN SATURATION: 99 % | DIASTOLIC BLOOD PRESSURE: 90 MMHG | RESPIRATION RATE: 19 BRPM | SYSTOLIC BLOOD PRESSURE: 155 MMHG

## 2022-10-05 PROCEDURE — 99213 OFFICE O/P EST LOW 20 MIN: CPT

## 2022-10-05 NOTE — ASSESSMENT
[FreeTextEntry1] : BRITANY JORGE is a 71 year M with a history of of lumbar radiculitis status post laminectomy, type 2 diabetes, COPD, CHF, MI, CKD stage III (b/l 1.6 - 2.1), and BPH s/p TURP 2017 who presents with the following:\par \par #Recurrent urethral stricture, urinary retention requiring valdes catheter\par - 10F bulbar stricture seen on cystoscopy 2/2022. Subsequently went to [place] ED where bedside dilation was performed. In the OR 5/2022 (after prolonged catheterization) his urethra was open, but he then likely developed a recurrent stricture and underwent bedside cysto / dilation again in the [place] ED (procedure note 8/10 available in HIE - reported narrowing at bulbar / membranous urethra\par - He is interested in a definitive solution as he does not want to continue wearing a catheter. I discussed a referral to my partner, Dr Unger, for consideration of urethroplasty / definitive treatment. Did discussed he is a high risk surgical candidate given comorbidities\par - Regarding catheter management, did recommend leaving the indwelling catheter given his ongoing back pain / history of retention w/ hydronephrosis. He is very adamant about removal. I also suggested CIC, which he reports he tried several years ago and was unable to do this. A voiding trial was performed today - filled with 150 mL normal saline, voided 120 mL PVR 20\par - RV 2 weeks for cystoscopy to see if stricture re-formed\par \par #Right hydronephrosis\par - Has had bilateral hydronephrosis in the setting of retention, but the right persists despite catheter drainage. Retrograde pyelogram demonstrated active drainage from kidney after contrast instillation. However, obstruction should be definitively ruled out with an NM renal scan\par - Renal US\par - NM renal scan

## 2022-10-05 NOTE — HISTORY OF PRESENT ILLNESS
[FreeTextEntry1] : Mr Mtz is a  71 y.o. M who presents for follow-up.\par \par To review, he was seen by me on Feb 1 after being seen with gross hematuria in the [place] ED. A CT scan was reportedly performed, the demonstrated "severe right-sided hydronephrosis, and moderate left hydronephrosis with a thickened bladder wall and enlarged prostate, suggestive of bladder outlet obstruction". I performed an office cystoscopy which revealed a stricture (10F bulbar stricture). He was subsequently admitted to [facility] for UTI / pneumonia. We do not have full records at this time, but he tells me a catheter was placed "with difficulty" which required "stretching."\par \par He previously followed with Dr. Jacobsen, and was last seen in 2018. He has a history of BPH and urinary retention s/p TURP 6/28/17 by Dr. Jacobsen. Following surgery, he continued to have elevated PVRs and some LUTS. \par UDS 2/2018: QMax 8 -  mL, flow pattern staccato. No DO. Capacity 338 mL, normal sensation, possibly valsalva-voiding. \par \par To evaluate both the urethra and right ureter / kidney (given right hydronephosis) he went to the OR with me on May 16 for cystoscopy, bladder biopsy, right retrograde pyelogram. He had a mildly dilated renal pelvis but contrast readily excreted from the kidney and a stent was not placed. In addition, his urethra was open. A bladder biopsy was performed which was benign. His catheter was removed May 16.\par \par He then admitted to [facility] from 8/6 - 8/18 with bilateral leg pain and difficulty urinating. He required valdes placement via cystoscopy (reportedly had narrowing in penile and bulbar urethra - catheter placed over a wire). He was found to have an ESTEVAN and a CT scan demonstrating bilateral hydronephrosis, which improved on follow-up imaging (right significantly improved, left resolved). He was also found to have heart failure.\par \par He was last seen by me on August 31. At that appointment, I recommended leaving his indwelling valdes catheter given that he had recurrent requiring cystoscopic placement of the catheter, and given his recent diagnosis of heart failure and the fact he is wearing a LifeVest.  He was again admitted to [facility] with bilateral leg pain, difficulty ambulating, and spinal pain.  He reports he underwent several MRIs, which were negative.  He still having pain.  During his hospitalization, he reports he was found to have a urinary tract infection, and is on antibiotics.

## 2022-10-05 NOTE — PHYSICAL EXAM
[Normal Appearance] : normal appearance [Well Groomed] : well groomed [Abdomen Tenderness] : non-tender [Urethral Meatus] : meatus normal [Penis Abnormality] : normal uncircumcised penis [FreeTextEntry1] : 16F valdes draining yellow urine

## 2022-10-13 NOTE — PROGRESS NOTE ADULT - ASSESSMENT
A/P: 68M s/p T11-S1 Lami/Fusion.    S/p T11-S1 Lami/Fusion:    Ortho spine f/up noted.  Pain control through Dilaudid PCA  ALIF on tuesday    DM II:  Lantus/Humalog/FSSS  Endo f/up noted.    Lt shoulder pain:  X Ray Lt shouder      HTN:  Cw lasix/Losartan    CKD IV:  BMP/Cr stable Detail Level: Generalized

## 2022-10-18 ENCOUNTER — APPOINTMENT (OUTPATIENT)
Dept: UROLOGY | Facility: CLINIC | Age: 72
End: 2022-10-18

## 2022-10-18 VITALS
TEMPERATURE: 97.6 F | HEART RATE: 91 BPM | OXYGEN SATURATION: 98 % | WEIGHT: 155 LBS | BODY MASS INDEX: 26.46 KG/M2 | RESPIRATION RATE: 16 BRPM | HEIGHT: 64 IN

## 2022-10-18 DIAGNOSIS — R31.0 GROSS HEMATURIA: ICD-10-CM

## 2022-10-18 PROCEDURE — 52000 CYSTOURETHROSCOPY: CPT

## 2022-10-20 ENCOUNTER — NON-APPOINTMENT (OUTPATIENT)
Age: 72
End: 2022-10-20

## 2022-10-26 ENCOUNTER — NON-APPOINTMENT (OUTPATIENT)
Age: 72
End: 2022-10-26

## 2022-11-01 ENCOUNTER — APPOINTMENT (OUTPATIENT)
Dept: UROLOGY | Facility: CLINIC | Age: 72
End: 2022-11-01

## 2022-11-01 VITALS
BODY MASS INDEX: 27.31 KG/M2 | HEIGHT: 64 IN | WEIGHT: 160 LBS | HEART RATE: 83 BPM | SYSTOLIC BLOOD PRESSURE: 163 MMHG | RESPIRATION RATE: 17 BRPM | DIASTOLIC BLOOD PRESSURE: 103 MMHG

## 2022-11-01 LAB
BILIRUB UR QL STRIP: NEGATIVE
CLARITY UR: CLEAR
COLLECTION METHOD: NORMAL
GLUCOSE UR-MCNC: NEGATIVE
HCG UR QL: 0.2 EU/DL
HGB UR QL STRIP.AUTO: NORMAL
KETONES UR-MCNC: NEGATIVE
LEUKOCYTE ESTERASE UR QL STRIP: NEGATIVE
NITRITE UR QL STRIP: NEGATIVE
PH UR STRIP: 6
PROT UR STRIP-MCNC: NORMAL
SP GR UR STRIP: 1.02

## 2022-11-01 PROCEDURE — 51798 US URINE CAPACITY MEASURE: CPT

## 2022-11-01 PROCEDURE — 81003 URINALYSIS AUTO W/O SCOPE: CPT | Mod: QW

## 2022-11-01 PROCEDURE — 99214 OFFICE O/P EST MOD 30 MIN: CPT

## 2022-11-01 NOTE — REASON FOR VISIT
Pharmacy Consultation Note  (Antibiotic Dosing and Monitoring)    Initial consult date: 4/27/22  Consulting physician/provider: Dr. Richa Vera  Drug: Vancomycin  Indication: PNA    Age/  Gender Height Weight IBW  Allergy Information   89 y.o./female 4' 11\" (149.9 cm) 145 lb 4.5 oz (65.9 kg)     Ideal body weight: 48.8 kg (107 lb 8.4 oz)  Adjusted ideal body weight: 55 kg (121 lb 3.5 oz)   Elemental sulfur, Ibuprofen, and Penicillins      Renal Function:  Recent Labs     04/27/22  0635 04/28/22  0650 04/29/22  0517   BUN 26* 25* 26*   CREATININE 1.5* 1.4* 1.4*       Intake/Output Summary (Last 24 hours) at 4/29/2022 1249  Last data filed at 4/29/2022 1005  Gross per 24 hour   Intake 340 ml   Output 700 ml   Net -360 ml       Vancomycin Monitoring:  Trough:  No results for input(s): VANCOTROUGH in the last 72 hours. Random:    Recent Labs     04/29/22  0517   VANCORANDOM 8.0       Vancomycin Administration Times:  Recent vancomycin administrations                   vancomycin (VANCOCIN) 1,250 mg in dextrose 5 % 250 mL IVPB (mg) 1,250 mg New Bag 04/27/22 4676              Assessment:  · Patient is a 81 y.o. female who has been initiated on vancomycin  Estimated Creatinine Clearance: 23 mL/min (A) (based on SCr of 1.4 mg/dL (H)). · To dose vancomycin, pharmacy will be utilizing dosing based off of levels because of patient's renal impairment/insufficiency   · 4/27: Current sCr above baseline up to 1.5 from previous baseline ~1  · 4/28: WBC, Scr 1.4, MRSA nares swab pending  · 4/29:  WBC 11.4, Scr 1.4, vancomycin level at 05:17 = 8 mcg/mL    Plan:  · Vancomycin 1000 mg IV x 1  · Will check vancomycin level with AM labs 4/30  · Will continue to monitor renal function     Zain Lara PharmD   Pharmacy Resident   Phone: [phone]  4/29/2022 12:49 PM [Follow-up Visit ___] : a follow-up visit  for [unfilled] [[employer] ] : provided by [employer]   [Interpreters_IDNumber] : [id number] [TWNoteComboBox1] : [place]

## 2022-11-01 NOTE — HISTORY OF PRESENT ILLNESS
[FreeTextEntry1] : Patient is a 71 yo M who presents for urethral stricture.  Referred by Dr Rubio.\par \par He was recently cystoscoped in 10/18/22 by Dr Rubio which showed  penile urethral stricture, ~10F, located ~7 cm from urethral meatus.  Peters was removed 10/5/22 by Dr Rubio.\par \par More recently pt has been dilated at bedside in ED x 2 in the past with recurrence. He was admitted at [place] with heart failure, ESTEVAN, PNA, UTI.  Newly diagnosed HF.   He also has h/o gross hematuria in Feb 2022  -  [place] ED. A CT scan was reportedly performed, the demonstrated "severe right-sided hydronephrosis, and moderate left hydronephrosis with a thickened bladder wall and enlarged prostate, suggestive of bladder outlet obstruction". \par \par To evaluate both the urethra and right ureter / kidney (given right hydronephosis) he went to the OR with Dr Rubio on May 16 for cystoscopy, bladder biopsy, right retrograde pyelogram. He had a mildly dilated renal pelvis but contrast readily excreted from the kidney and a stent was not placed. \par \par He previously followed with Dr. Jacobsen, and was last seen in 2018. He has a history of BPH and urinary retention s/p TURP 6/28/17 by Dr. Jacobsen. Following surgery, he continued to have elevated PVRs and some LUTS. \par UDS 2/2018: QMax 8 -  mL, flow pattern staccato. No DO. Capacity 338 mL, normal sensation, poor detrusor contractility, possibly valsalva-voiding. \par \par Currently pt states that he is urinating well.  Feels emptying well, with decent FOS.  He has not obtained renal ULT or renal scan as ordered by Dr Rubio.  He states he saw his nephrologist yesterday and was told everything is ok.  He is getting blood draws weekly as he had UTI/bacteremia in Sept 2022.  Per [facility]IE Cr is stable ~2 range\par

## 2022-11-01 NOTE — ASSESSMENT
[FreeTextEntry1] : Patient is a 73 yo M who presents for recent urinary retention, urethral stricture.\par \par Review of prior cysto in 2018 by Dr Jacobsen showed possible penile urethral stricture then.\par UDS in 2018 showed very poor detrusor contractility - no obvious detrusor contractility, pt appeared to void with valsalva voiding\par \par He currently feels he is voiding well, but he appears to have poor understanding of his condition and treatment plan.\par Instructed pt to obtain renal ULT and renal scan\par PVR today acceptable - 135cc - lower than previous\par Udip neg for infection\par On IV abx until 11/6 per pt\par If he goes into retention, he will need SPT to allow for urethral rest prior to any evaluation or procedure.\par However would obtain VUDS/VCUG/RUG and cysto to assess for his urethra and bladder function.\par F/u VUDS/cysto/RUG

## 2022-11-01 NOTE — PHYSICAL EXAM
[General Appearance - Well Developed] : well developed [General Appearance - Well Nourished] : well nourished [Normal Appearance] : normal appearance [Well Groomed] : well groomed [General Appearance - In No Acute Distress] : no acute distress [Abdomen Soft] : soft [Abdomen Tenderness] : non-tender [Abdomen Hernia] : no hernia was discovered

## 2022-11-03 ENCOUNTER — APPOINTMENT (OUTPATIENT)
Age: 72
End: 2022-11-03

## 2022-11-03 LAB — BACTERIA UR CULT: NORMAL

## 2022-11-04 NOTE — PATIENT PROFILE ADULT - PRO INTERPRETER NEED 2
Discharge Plan: Back to group home Tuesday with [facility] services   Services: Therapy, med management , case manger.    Placement: Group home    Referrals made: NA   Court status: NA   Gomez/SDM: MIGUEL   English

## 2022-12-06 ENCOUNTER — APPOINTMENT (OUTPATIENT)
Dept: UROLOGY | Facility: CLINIC | Age: 72
End: 2022-12-06

## 2022-12-09 ENCOUNTER — OUTPATIENT (OUTPATIENT)
Dept: OUTPATIENT SERVICES | Facility: HOSPITAL | Age: 72
LOS: 1 days | End: 2022-12-09
Payer: COMMERCIAL

## 2022-12-09 ENCOUNTER — APPOINTMENT (OUTPATIENT)
Dept: UROLOGY | Facility: CLINIC | Age: 72
End: 2022-12-09
Payer: MEDICARE

## 2022-12-09 VITALS
RESPIRATION RATE: 16 BRPM | HEART RATE: 78 BPM | SYSTOLIC BLOOD PRESSURE: 123 MMHG | OXYGEN SATURATION: 100 % | DIASTOLIC BLOOD PRESSURE: 89 MMHG

## 2022-12-09 DIAGNOSIS — Z95.1 PRESENCE OF AORTOCORONARY BYPASS GRAFT: Chronic | ICD-10-CM

## 2022-12-09 DIAGNOSIS — Z90.79 ACQUIRED ABSENCE OF OTHER GENITAL ORGAN(S): Chronic | ICD-10-CM

## 2022-12-09 DIAGNOSIS — Z98.890 OTHER SPECIFIED POSTPROCEDURAL STATES: Chronic | ICD-10-CM

## 2022-12-09 PROCEDURE — 51728 CYSTOMETROGRAM W/VP: CPT | Mod: 26

## 2022-12-09 PROCEDURE — 51610 INJECTION FOR BLADDER X-RAY: CPT

## 2022-12-09 PROCEDURE — 74450 X-RAY URETHRA/BLADDER: CPT | Mod: 26

## 2022-12-09 PROCEDURE — 51741 ELECTRO-UROFLOWMETRY FIRST: CPT | Mod: 26

## 2022-12-09 PROCEDURE — 51784 ANAL/URINARY MUSCLE STUDY: CPT

## 2022-12-09 PROCEDURE — 51797 INTRAABDOMINAL PRESSURE TEST: CPT | Mod: 26

## 2022-12-09 PROCEDURE — 51728 CYSTOMETROGRAM W/VP: CPT

## 2022-12-09 PROCEDURE — 52000 CYSTOURETHROSCOPY: CPT

## 2022-12-09 PROCEDURE — 51784 ANAL/URINARY MUSCLE STUDY: CPT | Mod: 26

## 2022-12-09 PROCEDURE — 51797 INTRAABDOMINAL PRESSURE TEST: CPT

## 2022-12-09 PROCEDURE — 51741 ELECTRO-UROFLOWMETRY FIRST: CPT

## 2022-12-09 PROCEDURE — 52000 CYSTOURETHROSCOPY: CPT | Mod: 59

## 2022-12-09 PROCEDURE — 74450 X-RAY URETHRA/BLADDER: CPT

## 2022-12-12 DIAGNOSIS — R33.9 RETENTION OF URINE, UNSPECIFIED: ICD-10-CM

## 2022-12-12 DIAGNOSIS — N35.919 UNSPECIFIED URETHRAL STRICTURE, MALE, UNSPECIFIED SITE: ICD-10-CM

## 2022-12-13 ENCOUNTER — APPOINTMENT (OUTPATIENT)
Dept: UROLOGY | Facility: CLINIC | Age: 72
End: 2022-12-13

## 2022-12-13 VITALS
RESPIRATION RATE: 17 BRPM | HEIGHT: 64 IN | DIASTOLIC BLOOD PRESSURE: 90 MMHG | BODY MASS INDEX: 27.31 KG/M2 | SYSTOLIC BLOOD PRESSURE: 170 MMHG | HEART RATE: 84 BPM | WEIGHT: 160 LBS

## 2022-12-13 PROCEDURE — 99214 OFFICE O/P EST MOD 30 MIN: CPT

## 2022-12-13 NOTE — ASSESSMENT
[FreeTextEntry1] : Patient is a 71 yo M who presents for urethral stricture, history of BPH and urinary retention s/p TURP 6/28/17.  Poorly contractile/acontractile bladder.  Multiple urethral strictures.\par \par Also with significant co-morbidities including HF, CKD. \par Currently feels well/stable from voiding standpoint\par D/w pt and his son treatment options - that given poor contractility, repair of urethral strictures would be less beneficial/successful as he may still have significant retention and ultimately still require indwelling cath or self cath. Also given multiple short strictures, may not be amenable to anastomotic repair and may require substitution urethroplasty.  Also d/w pt options of dilation w CIC, indwelling valdes vs SPT.\par D/w pt that addition to risk of persistent retention, he may have high risk of severe incontinence as 2nd stricture is located at BM/membranous urethra if urethroplasty would be performed as he had prior TURP and there is risk of affecting sphincter during surgery.\par D/w pt that he would need medical/cardiac clearance for any urethroplasty as longer surgery and more anesthesia risk.\par After very long discussion with pt and son, pt is still unsure if wishes to pursue SPT or urethroplasty.\par He has some ?poor insight, per pt son is just very stubborn.\par He is interested in speaking with his family more and Dr Rubio.\par I d/w pt that he is at high risk of retention and further ESTEVAN.  He currently feels well.\par F/u PRN with me if he wishes to pursue urethroplasty, otherwise to see Dr Rubio for SPT\par \par I have spent 30 minutes of time on the encounter including reviewing prior records, discussing with pt the above condition and various treatment options, and documentation. \par

## 2022-12-13 NOTE — REASON FOR VISIT
[Follow-up Visit ___] : a follow-up visit  for [unfilled] [Family Member] : family member [Patient Declined  Services] : - None: Patient declined  services [Interpreters_Relationshiptopatient] : pt son [TWNoteComboBox1] : [place]

## 2022-12-13 NOTE — HISTORY OF PRESENT ILLNESS
[FreeTextEntry1] : Patient is a 71 yo M who presents for urethral stricture. Referred by Dr Rubio.\par \par He was recently cystoscoped in 10/18/22 by Dr Rubio which showed penile urethral stricture, ~10F, located ~7 cm from urethral meatus. Peters was removed 10/5/22 by Dr Rubio.\par \par More recently pt has been dilated at bedside in ED x 2 in the past with recurrence. He was admitted at [place] with heart failure, ESTEVAN, PNA, UTI. Newly diagnosed HF. He also has h/o gross hematuria in Feb 2022 - [place] ED. A CT scan was reportedly performed, the demonstrated "severe right-sided hydronephrosis, and moderate left hydronephrosis with a thickened bladder wall and enlarged prostate, suggestive of bladder outlet obstruction". \par \par To evaluate both the urethra and right ureter / kidney (given right hydronephosis) he went to the OR with Dr Rubio on May 16 for cystoscopy, bladder biopsy, right retrograde pyelogram. He had a mildly dilated renal pelvis but contrast readily excreted from the kidney and a stent was not placed. \par \par He previously followed with Dr. Jacobsen, and was last seen in 2018. He has a history of BPH and urinary retention s/p TURP 6/28/17 by Dr. Jacobsen. Following surgery, he continued to have elevated PVRs and some LUTS. \par UDS 2/2018: QMax 8 -  mL, flow pattern staccato. No DO. Capacity 338 mL, normal sensation, poor detrusor contractility, possibly valsalva-voiding. \par \par Interval hx:\par Currently pt states that he is urinating well. Feels emptying well, with decent FOS. He has not obtained renal ULT or renal scan as ordered by Dr Rubio. Per HIE Cr is stable ~2 range.  Last Cr 10/30/22 2.05, Ucx 11/2022 neg.  He is followed by Nephrologist.\par \par Cysto/RUG/VUDS 12/6/22 showed poor contractility of bladder, incomplete emptying, valsalva voiding.  There are two tight ~10 Fr stricture, both short - prox penile urethra and prox BM/membranous urethra.\par \par

## 2022-12-13 NOTE — PHYSICAL EXAM
[General Appearance - Well Developed] : well developed [General Appearance - Well Nourished] : well nourished [Normal Appearance] : normal appearance [Well Groomed] : well groomed [General Appearance - In No Acute Distress] : no acute distress [] : no respiratory distress [Respiration, Rhythm And Depth] : normal respiratory rhythm and effort [Exaggerated Use Of Accessory Muscles For Inspiration] : no accessory muscle use

## 2023-01-23 NOTE — H&P PST ADULT - BP NONINVASIVE SYSTOLIC (MM HG)
Noted. If patient would like to schedule will need to follow up  
Outreach attempts to coordinate scheduling on the patient's Service to Medical Weight Management order in workqueue [id number] requested on 12/21/2022 have been conducted. Patient was contacted to schedule, stated they would call back to schedule. Per department policy, referral was deferred 30 days. Patient did not call back to schedule. Per department policy, referral is cancelled as unable to contact and referring provider is notified.  Please contact Stacey if further coordination is needed.  
[phone]

## 2023-02-14 NOTE — PATIENT PROFILE ADULT - NSPROMEDSPATCH_GEN_A_NUR
none [Vaginal Pap Smear] : vaginal Pap smear [Liquid Base] : liquid base [Tolerated Well] : the patient tolerated the procedure well [No Complications] : there were no complications

## 2023-04-11 NOTE — H&P PST ADULT - PROBLEM SELECTOR PLAN 3
Last Office Visit   2023  Upcomin/15/2023    Last Refill  clonazePAM (KlonoPIN) 0.5 MG tablet 20 tablet 0 10/11/2021     Sig - Route: Take 1 tablet by mouth 2 times daily as needed for Anxiety. - Oral    Sent to pharmacy as: clonazePAM 0.5 MG Oral Tablet (KlonoPIN)    Class: Eprescribe    E-Prescribing Status: Receipt confirmed by pharmacy (10/11/2021  7:56 AM CDT)        No Protocol  Medication has been pended for provider review.    HgA1C. Last dose of Basaglar Insulin chio before surgery, last dose of Novolog chio before surgery 36 units. Verbalized understanding.

## 2023-04-18 ENCOUNTER — APPOINTMENT (OUTPATIENT)
Dept: UROLOGY | Facility: CLINIC | Age: 73
End: 2023-04-18

## 2023-04-20 ENCOUNTER — APPOINTMENT (OUTPATIENT)
Dept: UROLOGY | Facility: CLINIC | Age: 73
End: 2023-04-20
Payer: MEDICARE

## 2023-04-20 VITALS
HEART RATE: 93 BPM | DIASTOLIC BLOOD PRESSURE: 77 MMHG | TEMPERATURE: 98.3 F | RESPIRATION RATE: 16 BRPM | SYSTOLIC BLOOD PRESSURE: 153 MMHG | OXYGEN SATURATION: 96 %

## 2023-04-20 DIAGNOSIS — N39.41 URGE INCONTINENCE: ICD-10-CM

## 2023-04-20 PROCEDURE — 99214 OFFICE O/P EST MOD 30 MIN: CPT

## 2023-04-20 NOTE — HISTORY OF PRESENT ILLNESS
[FreeTextEntry1] : Patient is a 73 yo M who presents for urethral stricture disease.\par \par He has been dilated at bedside in ED x 2 in the past with recurrence. He was admitted at [place] with heart failure, ESTEVAN, PNA, UTI. Newly diagnosed HF. He also has h/o gross hematuria in Feb 2022 - [place] ED. A CT scan was reportedly performed, the demonstrated "severe right-sided hydronephrosis, and moderate left hydronephrosis with a thickened bladder wall and enlarged prostate, suggestive of bladder outlet obstruction". \par \par To evaluate both the urethra and right ureter / kidney (given right hydronephosis) he went to the OR on May 16 for cystoscopy, bladder biopsy, right retrograde pyelogram. He had a mildly dilated renal pelvis but contrast readily excreted from the kidney and a stent was not placed. \par \par He previously followed with Dr. Jacobsen, and was last seen in 2018. He has a history of BPH and urinary retention s/p TURP 6/28/17 by Dr. Jacobsen. Following surgery, he continued to have elevated PVRs and some LUTS. \par UDS 2/2018: QMax 8 -  mL, flow pattern staccato. No DO. Capacity 338 mL, normal sensation, poor detrusor contractility, possibly valsalva-voiding. \par \par He underwent a cystoscopy by me October 18 which demonstrated a 10 Macedonian stricture, 7 cm from urethral meatus.  He was referred to Dr. Unger for consideration of urethroplasty. Cysto/RUG/VUDS 12/6/22 showed poor contractility of bladder, incomplete emptying, valsalva voiding. There are two tight ~10 Fr stricture, both short - prox penile urethra and prox BM/membranous urethra. Was offered urethroplasty vs SP tube\par \par Interval history: \alyse Was recently admitted to [facility] with abdominal / back pain\par During his hospitalization, a catheter was placed. He also had bilateral flank pain. He thinks some imaging was performed but this is not available at the time of this visit.\par A catheter was placed (not via cystoscopy / with dilation)\par He was treated with antibotics. Presents today requesting removal.

## 2023-04-20 NOTE — REASON FOR VISIT
[Follow-up Visit ___] : a follow-up visit  for [unfilled] [Interpreters_IDNumber] : [id number] [Interpreters_FullName] : Johann

## 2023-04-20 NOTE — PHYSICAL EXAM
[Normal Appearance] : normal appearance [Well Groomed] : well groomed [Abdomen Tenderness] : non-tender [Costovertebral Angle Tenderness] : no ~M costovertebral angle tenderness [FreeTextEntry1] : 16F catheter draining yellow urine

## 2023-04-20 NOTE — ASSESSMENT
[FreeTextEntry1] : 72 y.o. M with:\par \par #Recurrent urethral stricture, urinary retention requiring valdes catheter\par - 10F bulbar stricture seen on cystoscopy\par - Referred to Dr Unger - two strictures visualized\par - Patient with another episode of urinary retention requiring catheterization\par - He is adamately requesting removal. He is traveling to [place] in 5 days and does not want to travel with a catheter. Discussed he is at very high risk of going back into urinary retention requiring a catheter. Discussed risk of hydronephrosis / upper tract deterioration with removal. He states that he plans to remove his catheter on his own if I do not remove it today. I agreed to remove (to prevent him from self d/c-ing catheter) and re-discussed the risks\par - Recommended SPT. Discussed this will prevent upper tract deterioration, and can allow for urethral rest if a urethroplasty will be planned. He does not like the idea of a bag. I asked him to return with his son at next visit to discuss further\par - CT to assess pelvic anatomy (prior to SPT) and for hydronephrosis\par - RV 1 month

## 2023-04-24 NOTE — PRE-OP CHECKLIST - ADVANCE DIRECTIVE ADDRESSED/READDRESSED
on the discharge service for the patient. I have reviewed and made amendments to the documentation where necessary.
done

## 2023-05-24 ENCOUNTER — APPOINTMENT (OUTPATIENT)
Dept: UROLOGY | Facility: CLINIC | Age: 73
End: 2023-05-24
Payer: MEDICARE

## 2023-05-24 PROCEDURE — 99213 OFFICE O/P EST LOW 20 MIN: CPT

## 2023-05-24 NOTE — PHYSICAL EXAM
[Normal Appearance] : normal appearance [Abdomen Tenderness] : non-tender [Costovertebral Angle Tenderness] : no ~M costovertebral angle tenderness

## 2023-05-24 NOTE — HISTORY OF PRESENT ILLNESS
[FreeTextEntry1] : Patient is a 73 yo M who presents for urethral stricture disease.\par \par He has been dilated at bedside in ED x 2 in the past with recurrence. He was admitted at [place] with heart failure, ESTEVAN, PNA, UTI. Newly diagnosed HF. He also has h/o gross hematuria in Feb 2022 - [place] ED. A CT scan was reportedly performed, the demonstrated "severe right-sided hydronephrosis, and moderate left hydronephrosis with a thickened bladder wall and enlarged prostate, suggestive of bladder outlet obstruction". \par \par To evaluate both the urethra and right ureter / kidney (given right hydronephosis) he went to the OR on May 2022 for cystoscopy, bladder biopsy, right retrograde pyelogram. He had a mildly dilated renal pelvis but contrast readily excreted from the kidney and a stent was not placed. \par \par He previously followed with Dr. Jacobsen, and was last seen in 2018. He has a history of BPH and urinary retention s/p TURP 6/28/17 by Dr. Jacobsen. Following surgery, he continued to have elevated PVRs and some LUTS. \par UDS 2/2018: QMax 8 -  mL, flow pattern staccato. No DO. Capacity 338 mL, normal sensation, poor detrusor contractility, possibly valsalva-voiding. \par \par He underwent a cystoscopy by me October 2022 which demonstrated a 10 Mozambican stricture, 7 cm from urethral meatus.  He was referred to Dr. Unger for consideration of urethroplasty. Cysto/RUG/VUDS 12/6/22 showed poor contractility of bladder, incomplete emptying, valsalva voiding. There are two tight ~10 Fr stricture, both short - prox penile urethra and prox BM/membranous urethra. Was offered urethroplasty vs SP tube\par \par Interval history: \alyse Was in [facility] for 2 weeks. He saw a urologist locally for UTI symptoms - was given an antibiotic\par Symptoms have returned - reports thickened urine, dysuria\par Also reports urinary urgency - sleeps with a cup next to his bed so he does not have to run to the bathroom overnight\par Feels like he is emptying\par No fevers / chills / nausea / emesis

## 2023-05-24 NOTE — REASON FOR VISIT
[Follow-up Visit ___] : a follow-up visit  for [unfilled] [Interpreters_IDNumber] : [id number] [Interpreters_FullName] : Sherrell

## 2023-05-28 LAB — BACTERIA UR CULT: ABNORMAL

## 2023-05-28 RX ORDER — FLUCONAZOLE 100 MG/1
100 TABLET ORAL DAILY
Qty: 3 | Refills: 0 | Status: DISCONTINUED | COMMUNITY
Start: 2022-05-16 | End: 2023-05-28

## 2023-05-28 RX ORDER — SULFAMETHOXAZOLE AND TRIMETHOPRIM 400; 80 MG/1; MG/1
400-80 TABLET ORAL TWICE DAILY
Qty: 14 | Refills: 0 | Status: ACTIVE | COMMUNITY
Start: 2023-05-28 | End: 1900-01-01

## 2023-05-28 RX ORDER — SULFAMETHOXAZOLE AND TRIMETHOPRIM 800; 160 MG/1; MG/1
800-160 TABLET ORAL
Qty: 14 | Refills: 0 | Status: DISCONTINUED | COMMUNITY
Start: 2022-04-08 | End: 2023-05-28

## 2023-05-28 RX ORDER — LEVOFLOXACIN 750 MG/1
750 TABLET, FILM COATED ORAL DAILY
Qty: 10 | Refills: 0 | Status: DISCONTINUED | COMMUNITY
Start: 2022-04-05 | End: 2023-05-28

## 2023-05-28 RX ORDER — AMOXICILLIN AND CLAVULANATE POTASSIUM 500; 125 MG/1; MG/1
500-125 TABLET, FILM COATED ORAL
Qty: 6 | Refills: 0 | Status: DISCONTINUED | COMMUNITY
Start: 2022-05-16 | End: 2023-05-28

## 2023-05-28 RX ORDER — AMOXICILLIN AND CLAVULANATE POTASSIUM 500; 125 MG/1; MG/1
500-125 TABLET, FILM COATED ORAL
Qty: 14 | Refills: 0 | Status: DISCONTINUED | COMMUNITY
Start: 2022-04-26 | End: 2023-05-28

## 2023-06-16 ENCOUNTER — APPOINTMENT (OUTPATIENT)
Dept: UROLOGY | Facility: CLINIC | Age: 73
End: 2023-06-16

## 2023-09-06 NOTE — H&P PST ADULT - DOCUMENT STATUS
Authored by Resident/PA/NP Picato Counseling:  I discussed with the patient the risks of Picato including but not limited to erythema, scaling, itching, weeping, crusting, and pain.

## 2023-11-14 ENCOUNTER — APPOINTMENT (OUTPATIENT)
Dept: UROLOGY | Facility: CLINIC | Age: 73
End: 2023-11-14
Payer: MEDICARE

## 2023-11-14 DIAGNOSIS — R39.9 UNSPECIFIED SYMPTOMS AND SIGNS INVOLVING THE GENITOURINARY SYSTEM: ICD-10-CM

## 2023-11-14 PROCEDURE — 99214 OFFICE O/P EST MOD 30 MIN: CPT

## 2023-11-19 ENCOUNTER — NON-APPOINTMENT (OUTPATIENT)
Age: 73
End: 2023-11-19

## 2023-11-20 LAB
ANION GAP SERPL CALC-SCNC: 16 MMOL/L
APPEARANCE: ABNORMAL
BACTERIA UR CULT: ABNORMAL
BACTERIA: NEGATIVE /HPF
BILIRUBIN URINE: NEGATIVE
BLOOD URINE: ABNORMAL
BUN SERPL-MCNC: 46 MG/DL
CALCIUM SERPL-MCNC: 8.3 MG/DL
CAST: 7 /LPF
CHLORIDE SERPL-SCNC: 100 MMOL/L
CO2 SERPL-SCNC: 18 MMOL/L
COLOR: YELLOW
CREAT SERPL-MCNC: 2.92 MG/DL
EGFR: 22 ML/MIN/1.73M2
EPITHELIAL CELLS: 1 /HPF
FINE GRANULAR CASTS: PRESENT
GLUCOSE QUALITATIVE U: 500 MG/DL
GLUCOSE SERPL-MCNC: 335 MG/DL
HYALINE CASTS: PRESENT
KETONES URINE: NEGATIVE MG/DL
LEUKOCYTE ESTERASE URINE: ABNORMAL
MICROSCOPIC-UA: NORMAL
NITRITE URINE: NEGATIVE
PH URINE: 6
POTASSIUM SERPL-SCNC: 5 MMOL/L
PROTEIN URINE: 300 MG/DL
PSA SERPL-MCNC: 0.21 NG/ML
RED BLOOD CELLS URINE: 0 /HPF
REVIEW: NORMAL
SODIUM SERPL-SCNC: 133 MMOL/L
SPECIFIC GRAVITY URINE: 1.02
UROBILINOGEN URINE: 0.2 MG/DL
WBC CLUMPS: PRESENT
WHITE BLOOD CELLS URINE: 269 /HPF

## 2023-12-11 ENCOUNTER — APPOINTMENT (OUTPATIENT)
Dept: NEPHROLOGY | Facility: CLINIC | Age: 73
End: 2023-12-11

## 2023-12-11 VITALS
OXYGEN SATURATION: 96 % | HEART RATE: 76 BPM | SYSTOLIC BLOOD PRESSURE: 139 MMHG | TEMPERATURE: 97.9 F | WEIGHT: 161 LBS | BODY MASS INDEX: 27.64 KG/M2 | DIASTOLIC BLOOD PRESSURE: 68 MMHG

## 2024-01-26 NOTE — PROGRESS NOTE ADULT - SUBJECTIVE AND OBJECTIVE BOX
Patient is a 68y old  Male who presents with a chief complaint of back pain (28 Aug 2019 14:15)      SUBJECTIVE / OVERNIGHT EVENTS:    Events noted.  CONSTITUTIONAL: Tachycardia  RESPIRATORY: No cough, wheezing,  No shortness of breath  CARDIOVASCULAR: No chest pain, palpitations, dizziness, or leg swelling  GASTROINTESTINAL: No abdominal or epigastric pain. No nausea, vomiting.  NEUROLOGICAL: No headaches,     MEDICATIONS  (STANDING):  atorvastatin 10 milliGRAM(s) Oral at bedtime  ceFAZolin   IVPB 2000 milliGRAM(s) IV Intermittent every 8 hours  cholecalciferol 2000 Unit(s) Oral daily  docusate sodium 100 milliGRAM(s) Oral three times a day  ferrous    sulfate 325 milliGRAM(s) Oral daily  finasteride 5 milliGRAM(s) Oral daily  furosemide    Tablet 20 milliGRAM(s) Oral every 24 hours  HYDROmorphone PCA (1 mG/mL) 30 milliLiter(s) PCA Continuous PCA Continuous  insulin glargine Injectable (LANTUS) 27 Unit(s) SubCutaneous at bedtime  insulin lispro (HumaLOG) corrective regimen sliding scale   SubCutaneous three times a day before meals  insulin lispro (HumaLOG) corrective regimen sliding scale   SubCutaneous at bedtime  insulin lispro Injectable (HumaLOG) 9 Unit(s) SubCutaneous three times a day with meals  losartan 25 milliGRAM(s) Oral daily  pantoprazole    Tablet 40 milliGRAM(s) Oral before breakfast  pregabalin 100 milliGRAM(s) Oral two times a day  senna 2 Tablet(s) Oral at bedtime  tamsulosin 0.4 milliGRAM(s) Oral two times a day    MEDICATIONS  (PRN):  cyclobenzaprine 5 milliGRAM(s) Oral three times a day PRN Muscle Spasm  guaiFENesin    Syrup 100 milliGRAM(s) Oral every 6 hours PRN congestion  naloxone Injectable 0.1 milliGRAM(s) IV Push every 3 minutes PRN For ANY of the following changes in patient status:  A. RR LESS THAN 10 breaths per minute, B. Oxygen saturation LESS THAN 90%, C. Sedation score of 6  ondansetron Injectable 4 milliGRAM(s) IV Push every 6 hours PRN Nausea        CAPILLARY BLOOD GLUCOSE      POCT Blood Glucose.: 201 mg/dL (29 Aug 2019 21:41)  POCT Blood Glucose.: 209 mg/dL (29 Aug 2019 18:41)  POCT Blood Glucose.: 249 mg/dL (29 Aug 2019 12:36)  POCT Blood Glucose.: 200 mg/dL (29 Aug 2019 09:10)  POCT Blood Glucose.: 173 mg/dL (29 Aug 2019 00:15)    I&O's Summary    28 Aug 2019 07:01  -  29 Aug 2019 07:00  --------------------------------------------------------  IN: 1925 mL / OUT: 2277 mL / NET: -352 mL    29 Aug 2019 07:01  -  29 Aug 2019 23:24  --------------------------------------------------------  IN: 940 mL / OUT: 2081 mL / NET: -1141 mL        PHYSICAL EXAM:    NECK: Supple, No JVD  CHEST/LUNG: Clear to auscultation bilaterally; No wheezing.  HEART: Regular rate and rhythm; No murmurs, rubs, or gallops  ABDOMEN: Soft, Nontender, Nondistended; Bowel sounds present  EXTREMITIES:   No edema  NEUROLOGY: AAO       LABS:                        9.9    15.8  )-----------( 132      ( 29 Aug 2019 03:09 )             28.9     08-29    134<L>  |  102  |  27<H>  ----------------------------<  206<H>  4.6   |  21<L>  |  2.09<H>    Ca    8.8      29 Aug 2019 03:09  Phos  3.6     08-29  Mg     1.9     08-29      PT/INR - ( 28 Aug 2019 21:39 )   PT: 14.7 sec;   INR: 1.27 ratio         PTT - ( 28 Aug 2019 21:39 )  PTT:31.5 sec  CARDIAC MARKERS ( 29 Aug 2019 03:09 )  x     / x     / 2284 U/L / x     / 8.3 ng/mL  CARDIAC MARKERS ( 28 Aug 2019 21:39 )  x     / x     / 2565 U/L / x     / 9.8 ng/mL        CAPILLARY BLOOD GLUCOSE      POCT Blood Glucose.: 201 mg/dL (29 Aug 2019 21:41)  POCT Blood Glucose.: 209 mg/dL (29 Aug 2019 18:41)  POCT Blood Glucose.: 249 mg/dL (29 Aug 2019 12:36)  POCT Blood Glucose.: 200 mg/dL (29 Aug 2019 09:10)  POCT Blood Glucose.: 173 mg/dL (29 Aug 2019 00:15)                RADIOLOGY & ADDITIONAL TESTS:    Imaging Personally Reviewed:    Consultant(s) Notes Reviewed:      Care Discussed with Consultants/Other Providers: PAST SURGICAL HISTORY:  H/O  section x 3    Multiple lipomas

## 2024-02-02 ENCOUNTER — APPOINTMENT (OUTPATIENT)
Dept: UROLOGY | Facility: CLINIC | Age: 74
End: 2024-02-02
Payer: MEDICARE

## 2024-02-02 VITALS — DIASTOLIC BLOOD PRESSURE: 80 MMHG | HEART RATE: 78 BPM | SYSTOLIC BLOOD PRESSURE: 153 MMHG

## 2024-02-02 PROCEDURE — 99213 OFFICE O/P EST LOW 20 MIN: CPT

## 2024-02-02 NOTE — HISTORY OF PRESENT ILLNESS
[FreeTextEntry1] :    Patient is a 74 yo M who presents for urethral stricture. Referred by Dr Rubio.  He was recently cystoscoped in 10/18/22 by Dr Rubio which showed penile urethral stricture, ~10F, located ~7 cm from urethral meatus. Peters was removed 10/5/22 by Dr Rubio.  More recently pt has been dilated at bedside in ED x 2 in the past with recurrence. He was admitted at [place] with heart failure, ESTEVAN, PNA, UTI. Newly diagnosed HF. He also has h/o gross hematuria in Feb 2022 - [place] ED. A CT scan was reportedly performed, the demonstrated "severe right-sided hydronephrosis, and moderate left hydronephrosis with a thickened bladder wall and enlarged prostate, suggestive of bladder outlet obstruction".  To evaluate both the urethra and right ureter / kidney (given right hydronephosis) he went to the OR with Dr Rubio on May 16 for cystoscopy, bladder biopsy, right retrograde pyelogram. He had a mildly dilated renal pelvis but contrast readily excreted from the kidney and a stent was not placed.  He previously followed with Dr. Jacobsen, and was last seen in 2018. He has a history of BPH and urinary retention s/p TURP 6/28/17 by Dr. Jacobsen. Following surgery, he continued to have elevated PVRs and some LUTS.  UDS 2/2018: QMax 8 -  mL, flow pattern staccato. No DO. Capacity 338 mL, normal sensation, poor detrusor contractility, possibly valsalva-voiding.   Interval hx: 12/2022 Currently pt states that he is urinating well. Feels emptying well, with decent FOS. He has not obtained renal ULT or renal scan as ordered by Dr Rubio. Per HIE Cr is stable ~2 range. Last Cr 10/30/22 2.05, Ucx 11/2022 neg. He is followed by Nephrologist.  Cysto/RUG/VUDS 12/6/22 showed poor contractility of bladder, incomplete emptying, valsalva voiding. There are two tight ~10 Fr stricture, both short - prox penile urethra and prox BM/membranous urethra.  He declined reconstructive surgery.  2/2024:  Patient reports no issues currently. However BMP with worsened creatinine 2.9 from baseline 2. He saw nephrologist who he reports told him that this could be related to elevated blood glucose. Multidrug resistant UTI in Nov. Patient advised by Dr Rubio to have renal US but did not do this.  Pt is here to discuss again his surgical options.

## 2024-02-02 NOTE — ASSESSMENT
[FreeTextEntry1] : Patient is a 72 yo M who presents for urethral stricture, history of BPH and urinary retention s/p TURP 6/28/17. Poorly contractile/acontractile bladder. Multiple urethral strictures. Also with significant co-morbidities including HF, CKD.  He is here to again discuss urethroplasty surgery, referred back from Dr Rubio who has been following Again reviewed with pt treatment options - including substitution urethroplasty vs spt Reviewed postop course, duration of catheterization, r/b/a After discussion, he remains adamant that he does not want a catheter for any potential prolonged length D/w pt that cannot perform surgery if he is unwilling to have catheter He does again appear to have poor insight He will f/u with Dr Rubio

## 2024-02-02 NOTE — REASON FOR VISIT
[Follow-up Visit ___] : a follow-up visit  for [unfilled] [[employer] ] : provided by [employer]   [Interpreters_IDNumber] : [id number] [TWNoteComboBox1] : Citizen of [place]

## 2024-02-02 NOTE — PHYSICAL EXAM
[General Appearance - Well Developed] : well developed [General Appearance - Well Nourished] : well nourished [Normal Appearance] : normal appearance [Well Groomed] : well groomed [General Appearance - In No Acute Distress] : no acute distress [] : no respiratory distress [Respiration, Rhythm And Depth] : normal respiratory rhythm and effort

## 2024-03-22 NOTE — H&P PST ADULT - BP NONINVASIVE DIASTOLIC (MM HG)
Okay to refill medication as in the past.  No change in dose.  Rx sent to patient pharmacy     yes 94

## 2024-04-09 ENCOUNTER — APPOINTMENT (OUTPATIENT)
Dept: UROLOGY | Facility: CLINIC | Age: 74
End: 2024-04-09
Payer: MEDICARE

## 2024-04-09 VITALS
HEART RATE: 96 BPM | DIASTOLIC BLOOD PRESSURE: 91 MMHG | OXYGEN SATURATION: 94 % | RESPIRATION RATE: 16 BRPM | WEIGHT: 161 LBS | SYSTOLIC BLOOD PRESSURE: 153 MMHG | BODY MASS INDEX: 27.49 KG/M2 | HEIGHT: 64 IN

## 2024-04-09 DIAGNOSIS — R32 UNSPECIFIED URINARY INCONTINENCE: ICD-10-CM

## 2024-04-09 PROCEDURE — 99213 OFFICE O/P EST LOW 20 MIN: CPT

## 2024-04-09 NOTE — HISTORY OF PRESENT ILLNESS
[FreeTextEntry1] : Patient is a 74 yo M who presents for urethral stricture disease.  Long standing history of urethral stricture disease. He has been dilated at bedside in ED x 3 in the past with recurrence. He was admitted at [place] with heart failure, ESTEVAN, PNA, UTI. Newly diagnosed HF. He also has h/o gross hematuria in Feb 2022 - [place] ED. A CT scan was reportedly performed, the demonstrated "severe right-sided hydronephrosis, and moderate left hydronephrosis with a thickened bladder wall and enlarged prostate, suggestive of bladder outlet obstruction".   To evaluate both the urethra and right ureter / kidney (given right hydronephosis) he went to the OR on May 2022 for cystoscopy, bladder biopsy, right retrograde pyelogram. He had a mildly dilated renal pelvis but contrast readily excreted from the kidney and a stent was not placed.   He previously followed with Dr. Jacobsen, and was last seen in 2018. He has a history of BPH and urinary retention s/p TURP 6/28/17 by Dr. Jacobsen. Following surgery, he continued to have elevated PVRs and some LUTS.  UDS 2/2018: QMax 8 -  mL, flow pattern staccato. No DO. Capacity 338 mL, normal sensation, poor detrusor contractility, possibly valsalva-voiding.   He underwent a cystoscopy by me October 2022 which demonstrated a 10 Egyptian stricture, 7 cm from urethral meatus.  He was referred to Dr. Unger for consideration of urethroplasty. Cysto/RUG/VUDS 12/6/22 showed poor contractility of bladder, incomplete emptying, valsalva voiding. There are two tight ~10 Fr stricture, both short - prox penile urethra and prox BM/membranous urethra. Was offered urethroplasty vs SP tube  Interval history 4/2024:  Saw Dr Unger again for surgical options - was told he would need catheter and patient refused surgeries Again developed urinary retention at [facility] - had catheter placed 4 weeks ago. Reports he was admitted for  Last Cr 2.92 11/2023

## 2024-04-09 NOTE — PHYSICAL EXAM
[Normal Appearance] : normal appearance [Edema] : no peripheral edema [] : no respiratory distress [Bowel Sounds] : normal bowel sounds [Abdomen Tenderness] : non-tender

## 2024-04-09 NOTE — ASSESSMENT
[FreeTextEntry1] : 73 y.o. M with:  #Recurrent urethral stricture, intermittent urinary retention requiring valdes catheter, UTIs - Referred to Dr Unger - two strictures visualized - was offered SPT vs urethroplasty. Patient declined - Underwent UDS 2022 - poor contractility of bladder, valsalva voiding - Discussed BEST options are EITHER to keep catheter or have SPT placement. Discuss this is to both 1) ensure proper bladder emptying, 2) decrease recurrence of UTIs, and 3) prevent renal deterioration. He is not interested. Discussed risk of upper tract deterioration, worsening of kidney function, need for dialysis, continued UTIs by not addressing the outlet obstruction. - He reports he needs to have catheter removed. Interested in "natural options" - discussed no natural options available and my STRONG recommendation is to continue catheter. He declines and requests removal - Catheter removed - filled with 180 mL - voided onto pad / not collected, PVR 55 mL - Return for renal US - have ordered many times in past and patient has not completed - Recommended second opinion as patient seems unhappy / not satisfied with my recommendation of SPT vs valdes catheter as permanent solution

## 2024-04-09 NOTE — REASON FOR VISIT
[Follow-up Visit ___] : a follow-up visit  for [unfilled] [Interpreters_IDNumber] : [id number] [Interpreters_FullName] : Corinne

## 2024-04-16 NOTE — PHYSICAL THERAPY INITIAL EVALUATION ADULT - DIAGNOSIS, PT EVAL
Marlyn WELLS OJ
Impaired functional mobility secondary to decreased strength, balance and endurance, and post op pain
Impaired functional mobility secondary to decreased strength, balance and endurance

## 2024-05-07 ENCOUNTER — APPOINTMENT (OUTPATIENT)
Dept: UROLOGY | Facility: CLINIC | Age: 74
End: 2024-05-07
Payer: MEDICARE

## 2024-05-07 DIAGNOSIS — N39.0 URINARY TRACT INFECTION, SITE NOT SPECIFIED: ICD-10-CM

## 2024-05-07 DIAGNOSIS — R35.0 FREQUENCY OF MICTURITION: ICD-10-CM

## 2024-05-07 DIAGNOSIS — B95.2 URINARY TRACT INFECTION, SITE NOT SPECIFIED: ICD-10-CM

## 2024-05-07 DIAGNOSIS — R33.9 RETENTION OF URINE, UNSPECIFIED: ICD-10-CM

## 2024-05-07 PROCEDURE — 99213 OFFICE O/P EST LOW 20 MIN: CPT

## 2024-05-07 NOTE — PHYSICAL EXAM
[Normal Appearance] : normal appearance [Edema] : no peripheral edema [Bowel Sounds] : normal bowel sounds [] : no respiratory distress [Abdomen Tenderness] : non-tender [Urethral Meatus] : meatus normal [Epididymis] : the epididymides were normal [Testes Tenderness] : no tenderness of the testes [Testes Mass (___cm)] : there were no testicular masses

## 2024-05-07 NOTE — ASSESSMENT
[FreeTextEntry1] : 73 y.o. M with:  #Recurrent urethral stricture, intermittent urinary retention requiring valdes catheter, UTIs - Referred to Dr Unger - two strictures visualized - was offered SPT vs urethroplasty. Patient declined - Underwent UDS 2022 - poor contractility of bladder, valsalva voiding - Discussed BEST options are EITHER to replace catheter or have SPT placement. Discuss this is to both 1) ensure proper bladder emptying, 2) decrease recurrence of UTIs, and 3) prevent renal deterioration. He is not interested. Discussed risk of upper tract deterioration, worsening of kidney function, need for dialysis, continued UTIs by not addressing the outlet obstruction. - He reports he would "rather die" than have a catheter bag -  mL - Re-printed renal US - Re-recommended second opinion as patient seems unhappy / not satisfied with my recommendation of SPT vs valdes catheter as permanent solution

## 2024-05-07 NOTE — HISTORY OF PRESENT ILLNESS
[FreeTextEntry1] : Patient is a 72 yo M who presents for urethral stricture disease.  Long standing history of urethral stricture disease. He has been dilated at bedside in ED x 3 in the past with recurrence. He was admitted at [facility] with heart failure, ESTEVAN, PNA, UTI. Newly diagnosed HF. He also has h/o gross hematuria in Feb 2022 - [facility] ED. A CT scan was reportedly performed, the demonstrated "severe right-sided hydronephrosis, and moderate left hydronephrosis with a thickened bladder wall and enlarged prostate, suggestive of bladder outlet obstruction".   To evaluate both the urethra and right ureter / kidney (given right hydronephosis) he went to the OR on May 2022 for cystoscopy, bladder biopsy, right retrograde pyelogram. He had a mildly dilated renal pelvis but contrast readily excreted from the kidney and a stent was not placed.   He previously followed with Dr. Jacobsen, and was last seen in 2018. He has a history of BPH and urinary retention s/p TURP 6/28/17 by Dr. Jacobsen. Following surgery, he continued to have elevated PVRs and some LUTS.  UDS 2/2018: QMax 8 -  mL, flow pattern staccato. No DO. Capacity 338 mL, normal sensation, poor detrusor contractility, possibly valsalva-voiding.   He underwent a cystoscopy by me October 2022 which demonstrated a 10 [place] citizen stricture, 7 cm from urethral meatus.  He was referred to Dr. Unger for consideration of urethroplasty. Cysto/RUG/VUDS 12/6/22 showed poor contractility of bladder, incomplete emptying, valsalva voiding. There are two tight ~10 Fr stricture, both short - prox penile urethra and prox BM/membranous urethra. Was offered urethroplasty vs SP tube  Interval history 5/2024:  Saw Dr Unger again for surgical options - was told he would need catheter and patient refused surgeries Last Cr 2.92 11/2023 PSA 0.21 11/2023 Last seen April 2024 - catheter removed He reports he is voiding "fine" - he does have urgency and sleeps with a urinal next to his bed. He follows with nephrology PVR: 280 mL

## 2024-05-07 NOTE — REASON FOR VISIT
[Follow-up Visit ___] : a follow-up visit  for [unfilled] [[employer] ] : provided by [employer]   [Interpreters_IDNumber] : [id number] [Interpreters_FullName] : Denisha

## 2024-05-07 NOTE — LETTER BODY
[Dear  ___] : Dear  [unfilled], [Consult Letter:] : I had the pleasure of evaluating your patient, [unfilled]. [Please see my note below.] : Please see my note below. [Consult Closing:] : Thank you very much for allowing me to participate in the care of this patient.  If you have any questions, please do not hesitate to contact me. [Sincerely,] : Sincerely, [FreeTextEntry2] : Chrystal Rico MD [address] [facility] [facility], [place] [address] [FreeTextEntry3] : Lazaro Rubio MD [facility] at [address], [address] [place] [address] p: [phone] f: [phone]

## 2024-05-13 LAB — BACTERIA UR CULT: ABNORMAL

## 2024-05-17 ENCOUNTER — OUTPATIENT (OUTPATIENT)
Dept: OUTPATIENT SERVICES | Facility: HOSPITAL | Age: 74
LOS: 1 days | End: 2024-05-17
Payer: COMMERCIAL

## 2024-05-17 ENCOUNTER — APPOINTMENT (OUTPATIENT)
Dept: ULTRASOUND IMAGING | Facility: CLINIC | Age: 74
End: 2024-05-17
Payer: MEDICARE

## 2024-05-17 DIAGNOSIS — Z90.79 ACQUIRED ABSENCE OF OTHER GENITAL ORGAN(S): Chronic | ICD-10-CM

## 2024-05-17 DIAGNOSIS — Z95.1 PRESENCE OF AORTOCORONARY BYPASS GRAFT: Chronic | ICD-10-CM

## 2024-05-17 DIAGNOSIS — Z98.890 OTHER SPECIFIED POSTPROCEDURAL STATES: Chronic | ICD-10-CM

## 2024-05-17 DIAGNOSIS — N13.30 UNSPECIFIED HYDRONEPHROSIS: ICD-10-CM

## 2024-05-17 PROCEDURE — 76770 US EXAM ABDO BACK WALL COMP: CPT

## 2024-05-17 PROCEDURE — 76770 US EXAM ABDO BACK WALL COMP: CPT | Mod: 26

## 2024-06-27 ENCOUNTER — APPOINTMENT (OUTPATIENT)
Dept: UROLOGY | Facility: CLINIC | Age: 74
End: 2024-06-27
Payer: MEDICARE

## 2024-06-27 DIAGNOSIS — N35.919 UNSPECIFIED URETHRAL STRICTURE, MALE, UNSPECIFIED SITE: ICD-10-CM

## 2024-06-27 DIAGNOSIS — R33.9 RETENTION OF URINE, UNSPECIFIED: ICD-10-CM

## 2024-06-27 DIAGNOSIS — N13.30 UNSPECIFIED HYDRONEPHROSIS: ICD-10-CM

## 2024-06-27 PROCEDURE — 99214 OFFICE O/P EST MOD 30 MIN: CPT

## 2024-07-06 ENCOUNTER — OUTPATIENT (OUTPATIENT)
Dept: OUTPATIENT SERVICES | Facility: HOSPITAL | Age: 74
LOS: 1 days | End: 2024-07-06
Payer: COMMERCIAL

## 2024-07-06 ENCOUNTER — APPOINTMENT (OUTPATIENT)
Dept: CT IMAGING | Facility: CLINIC | Age: 74
End: 2024-07-06

## 2024-07-06 DIAGNOSIS — Z90.79 ACQUIRED ABSENCE OF OTHER GENITAL ORGAN(S): Chronic | ICD-10-CM

## 2024-07-06 DIAGNOSIS — N13.30 UNSPECIFIED HYDRONEPHROSIS: ICD-10-CM

## 2024-07-06 DIAGNOSIS — Z95.1 PRESENCE OF AORTOCORONARY BYPASS GRAFT: Chronic | ICD-10-CM

## 2024-07-06 DIAGNOSIS — Z98.890 OTHER SPECIFIED POSTPROCEDURAL STATES: Chronic | ICD-10-CM

## 2024-07-06 PROCEDURE — 74176 CT ABD & PELVIS W/O CONTRAST: CPT | Mod: 26

## 2024-07-06 PROCEDURE — 74176 CT ABD & PELVIS W/O CONTRAST: CPT

## 2024-07-22 ENCOUNTER — NON-APPOINTMENT (OUTPATIENT)
Age: 74
End: 2024-07-22

## 2024-08-13 NOTE — HISTORY OF PRESENT ILLNESS
[FreeTextEntry1] : Patient is a 74 yo M who presents for urethral stricture disease.  Long standing history of urethral stricture disease. He has been dilated at bedside in ED x 3 in the past with recurrence. He was admitted at [place] with heart failure, ESTEVAN, PNA, UTI. He also has h/o gross hematuria in Feb 2022 - [place] ED. A CT scan was reportedly performed, the demonstrated "severe right-sided hydronephrosis, and moderate left hydronephrosis with a thickened bladder wall and enlarged prostate, suggestive of bladder outlet obstruction".   To evaluate both the urethra and right ureter / kidney (given right hydronephosis) he went to the OR on May 2022 for cystoscopy, bladder biopsy, right retrograde pyelogram. He had a mildly dilated renal pelvis but contrast readily excreted from the kidney and a stent was not placed.   He previously followed with Dr. Jacobsen, and was last seen in 2018. He has a history of BPH and urinary retention s/p TURP 6/28/17 by Dr. Jacobsen. Following surgery, he continued to have elevated PVRs and some LUTS.  UDS 2/2018: QMax 8 -  mL, flow pattern staccato. No DO. Capacity 338 mL, normal sensation, poor detrusor contractility, possibly valsalva-voiding.   He underwent a cystoscopy by me October 2022 which demonstrated a 10 Portuguese stricture, 7 cm from urethral meatus.  He was referred to Dr. Unger for consideration of urethroplasty. Cysto/RUG/VUDS 12/6/22 showed poor contractility of bladder, incomplete emptying, valsalva voiding. There are two tight ~10 Fr stricture, both short - prox penile urethra and prox BM/membranous urethra. Was offered urethroplasty vs SP tube  Interval history 8/2024:  Last seen 6/2024 CT 6/2024 - distended bladder w/ b/l hydronephrosis - recommended catheter and patient declined

## 2024-08-13 NOTE — ASSESSMENT
[FreeTextEntry1] : 73 y.o. M with:  #Recurrent urethral stricture, intermittent urinary retention requiring valdes catheter, UTIs - Referred to Dr Unger - two strictures visualized - was offered SPT vs urethroplasty. Patient declined - Underwent UDS 2022 - poor contractility of bladder, valsalva voiding - Discussed BEST options are EITHER to replace catheter or have SPT placement. Discuss this is to both 1) ensure proper bladder emptying, 2) decrease recurrence of UTIs, and 3) prevent renal deterioration. He is not interested. Discussed risk of upper tract deterioration, worsening of kidney function, need for dialysis, continued UTIs by not addressing the outlet obstruction. - Had LONG discussion regarding catheter - discussed that this has been removed multiple times and has been replaced. Discussed he will likely develop retention again. He states he would rather "die" than have a bag and will remove this himself at home. Catheter removed. - Re-recommended second opinion as patient seems unhappy / not satisfied with my recommendation of SPT vs valdes catheter as permanent solution.  #Hydronephrosis, CKD - US w/ stable hydronephrosis - Again STRONGLY recommended he leave catheter - Has f/u with his nephrologist next month - Recommend CT to rule out obstructing stone, external compression. Previously underwent cystoscopy / retrograde pyelogram in the OR 2022  rpa 6 months

## 2024-08-14 ENCOUNTER — APPOINTMENT (OUTPATIENT)
Dept: UROLOGY | Facility: CLINIC | Age: 74
End: 2024-08-14

## 2024-08-29 ENCOUNTER — DOCTOR'S OFFICE (OUTPATIENT)
Age: 74
Setting detail: OPHTHALMOLOGY
End: 2024-08-29
Payer: COMMERCIAL

## 2024-08-29 DIAGNOSIS — H35.373: ICD-10-CM

## 2024-08-29 DIAGNOSIS — E11.3291: ICD-10-CM

## 2024-08-29 DIAGNOSIS — E11.3212: ICD-10-CM

## 2024-08-29 DIAGNOSIS — Z96.1: ICD-10-CM

## 2024-08-29 DIAGNOSIS — H11.043: ICD-10-CM

## 2024-08-29 PROBLEM — H52.7 REFRACTIVE ERROR: Status: ACTIVE | Noted: 2024-08-29

## 2024-08-29 PROCEDURE — 92014 COMPRE OPH EXAM EST PT 1/>: CPT | Performed by: OPHTHALMOLOGY

## 2024-08-29 ASSESSMENT — LID EXAM ASSESSMENTS
OD_BLEPHARITIS: RLL RUL 2+
OS_BLEPHARITIS: LLL LUL 2+

## 2024-08-29 ASSESSMENT — CONFRONTATIONAL VISUAL FIELD TEST (CVF)
OS_FINDINGS: FULL
OD_FINDINGS: FULL

## 2024-09-12 ENCOUNTER — APPOINTMENT (OUTPATIENT)
Dept: UROLOGY | Facility: CLINIC | Age: 74
End: 2024-09-12
Payer: MEDICARE

## 2024-09-12 VITALS
BODY MASS INDEX: 27.49 KG/M2 | HEIGHT: 64 IN | RESPIRATION RATE: 16 BRPM | DIASTOLIC BLOOD PRESSURE: 91 MMHG | HEART RATE: 97 BPM | SYSTOLIC BLOOD PRESSURE: 172 MMHG | WEIGHT: 161 LBS | OXYGEN SATURATION: 98 % | TEMPERATURE: 97.5 F

## 2024-09-12 DIAGNOSIS — R35.0 FREQUENCY OF MICTURITION: ICD-10-CM

## 2024-09-12 DIAGNOSIS — N39.41 URGE INCONTINENCE: ICD-10-CM

## 2024-09-12 DIAGNOSIS — N39.0 URINARY TRACT INFECTION, SITE NOT SPECIFIED: ICD-10-CM

## 2024-09-12 DIAGNOSIS — R32 UNSPECIFIED URINARY INCONTINENCE: ICD-10-CM

## 2024-09-12 DIAGNOSIS — R33.9 RETENTION OF URINE, UNSPECIFIED: ICD-10-CM

## 2024-09-12 DIAGNOSIS — B95.2 URINARY TRACT INFECTION, SITE NOT SPECIFIED: ICD-10-CM

## 2024-09-12 PROCEDURE — 99214 OFFICE O/P EST MOD 30 MIN: CPT

## 2024-09-12 NOTE — PHYSICAL EXAM
[Normal Appearance] : normal appearance [Edema] : no peripheral edema [] : no respiratory distress [Bowel Sounds] : normal bowel sounds [Abdomen Tenderness] : non-tender [Urethral Meatus] : meatus normal [Epididymis] : the epididymides were normal [Testes Tenderness] : no tenderness of the testes

## 2024-09-12 NOTE — HISTORY OF PRESENT ILLNESS
[FreeTextEntry1] : Patient is a 72 yo M who presents for urethral stricture disease.  Long standing history of urethral stricture disease. He has been dilated at bedside in ED x 3 in the past with recurrence. He was admitted at [place] with heart failure, ESTEVAN, PNA, UTI. He also has h/o gross hematuria in Feb 2022 - [place] ED. A CT scan was reportedly performed, the demonstrated "severe right-sided hydronephrosis, and moderate left hydronephrosis with a thickened bladder wall and enlarged prostate, suggestive of bladder outlet obstruction".   To evaluate both the urethra and right ureter / kidney (given right hydronephosis) he went to the OR on May 2022 for cystoscopy, bladder biopsy, right retrograde pyelogram. He had a mildly dilated renal pelvis but contrast readily excreted from the kidney and a stent was not placed.   He previously followed with Dr. Jacobsen, and was last seen in 2018. He has a history of BPH and urinary retention s/p TURP 6/28/17 by Dr. Jacobsen. Following surgery, he continued to have elevated PVRs and some LUTS.  UDS 2/2018: QMax 8 -  mL, flow pattern staccato. No DO. Capacity 338 mL, normal sensation, poor detrusor contractility, possibly valsalva-voiding.   He underwent a cystoscopy by me October 2022 which demonstrated a 10 [place] stricture, 7 cm from urethral meatus.  He was referred to Dr. Unger for consideration of urethroplasty. Cysto/RUG/VUDS 12/6/22 showed poor contractility of bladder, incomplete emptying, valsalva voiding. There are two tight ~10 Fr stricture, both short - prox penile urethra and prox BM/membranous urethra. Was offered urethroplasty vs SP tube  Interval history 9/2024:  Last seen 6/2024 CT 6/2024 - distended bladder w/ b/l hydronephrosis - recommended catheter and patient declined PSA 11/2023 - 0.21 He reports he is feeling well. He reports he is voiding well without recent infection, gross hematuria.  Last catheterization was in June  PVR today: 250 mL

## 2024-09-12 NOTE — HISTORY OF PRESENT ILLNESS
[FreeTextEntry1] : Patient is a 74 yo M who presents for urethral stricture disease.  Long standing history of urethral stricture disease. He has been dilated at bedside in ED x 3 in the past with recurrence. He was admitted at [place] with heart failure, ESTEVAN, PNA, UTI. He also has h/o gross hematuria in Feb 2022 - [place] ED. A CT scan was reportedly performed, the demonstrated "severe right-sided hydronephrosis, and moderate left hydronephrosis with a thickened bladder wall and enlarged prostate, suggestive of bladder outlet obstruction".   To evaluate both the urethra and right ureter / kidney (given right hydronephosis) he went to the OR on May 2022 for cystoscopy, bladder biopsy, right retrograde pyelogram. He had a mildly dilated renal pelvis but contrast readily excreted from the kidney and a stent was not placed.   He previously followed with Dr. Jacobsen, and was last seen in 2018. He has a history of BPH and urinary retention s/p TURP 6/28/17 by Dr. Jacobsen. Following surgery, he continued to have elevated PVRs and some LUTS.  UDS 2/2018: QMax 8 -  mL, flow pattern staccato. No DO. Capacity 338 mL, normal sensation, poor detrusor contractility, possibly valsalva-voiding.   He underwent a cystoscopy by me October 2022 which demonstrated a 10 [place] stricture, 7 cm from urethral meatus.  He was referred to Dr. Unger for consideration of urethroplasty. Cysto/RUG/VUDS 12/6/22 showed poor contractility of bladder, incomplete emptying, valsalva voiding. There are two tight ~10 Fr stricture, both short - prox penile urethra and prox BM/membranous urethra. Was offered urethroplasty vs SP tube  Interval history 9/2024:  Last seen 6/2024 CT 6/2024 - distended bladder w/ b/l hydronephrosis - recommended catheter and patient declined PSA 11/2023 - 0.21 He reports he is feeling well. He reports he is voiding well without recent infection, gross hematuria.  Last catheterization was in June  PVR today: 250 mL

## 2024-09-12 NOTE — LETTER BODY
[Dear  ___] : Dear  [unfilled], [Consult Letter:] : I had the pleasure of evaluating your patient, [unfilled]. [Please see my note below.] : Please see my note below. [Consult Closing:] : Thank you very much for allowing me to participate in the care of this patient.  If you have any questions, please do not hesitate to contact me. [Sincerely,] : Sincerely, [FreeTextEntry2] : Nikita Wen MD [facility]-[address], [address], [place] [id number] [FreeTextEntry3] : Lazaro Rubio MD [facility] at [address], [address] [place] [address] p: [phone] f: [phone]

## 2024-09-12 NOTE — ASSESSMENT
[FreeTextEntry1] : 73 y.o. M with:  #Recurrent urethral stricture, intermittent urinary retention requiring valdes catheter, UTIs - Referred to Dr Unger - two strictures visualized - was offered SPT vs urethroplasty. Patient declined - Underwent UDS 2022 - poor contractility of bladder, valsalva voiding - Again recommended SPT.  Discuss this is to both 1) ensure proper bladder emptying, 2) decrease recurrence of UTIs, and 3) prevent renal deterioration. He is not interested. Discussed risk of upper tract deterioration, worsening of kidney function, need for dialysis, continued UTIs by not addressing the outlet obstruction. - Re-recommended second opinion as patient seems unhappy / not satisfied with my recommendation of SPT vs valdes catheter as permanent solution.  #Hydronephrosis, CKD - CT with distended bladder, b/l hydro - Again STRONGLY recommended valdes or SPT - WILL send note to nephrologist   rpa 6 months

## 2024-09-13 LAB
APPEARANCE: CLEAR
BACTERIA: NEGATIVE /HPF
BILIRUBIN URINE: NEGATIVE
BLOOD URINE: NEGATIVE
CAST: 4 /LPF
COLOR: YELLOW
EPITHELIAL CELLS: 4 /HPF
GLUCOSE QUALITATIVE U: 250 MG/DL
KETONES URINE: NEGATIVE MG/DL
LEUKOCYTE ESTERASE URINE: ABNORMAL
MICROSCOPIC-UA: NORMAL
NITRITE URINE: NEGATIVE
PH URINE: 6
PROTEIN URINE: 100 MG/DL
RED BLOOD CELLS URINE: 1 /HPF
SPECIFIC GRAVITY URINE: 1.01
UROBILINOGEN URINE: 0.2 MG/DL
WHITE BLOOD CELLS URINE: 9 /HPF

## 2024-09-13 NOTE — PROGRESS NOTE ADULT - SUBJECTIVE AND OBJECTIVE BOX
SUBJECTIVE  HPI Torres is 21 year old male who presents w/ c/o sore throat  this has been present for 2 days . Other symptoms include  fever . No nausea, vomiting, diarrhea, constipation, urinary difficulties. No numbness and tingling in the extremities or blurred vision. No chest pain or SOB or wheezing. ROS negative otherwise      Chart/previous notes reviewed:  No      OTC meds tried: [+]    Past Medical History:   Diagnosis Date    Allergic rhinoconjunctivitis 06/26/2017    Depression with anxiety     History of migraine headaches     Took Propranolol by neurology    IBS (irritable bowel syndrome)     Pectus excavatum     Retina disorder, left       Past Surgical History:   Procedure Laterality Date    Anes hammertoe or; 1 toe Left       Social History     Tobacco Use    Smoking status: Never    Smokeless tobacco: Current    Tobacco comments:     vapes   Vaping Use    Vaping status: never used   Substance Use Topics    Alcohol use: Yes     Comment: social    Drug use: No      Current Outpatient Medications   Medication Sig Dispense Refill    ibuprofen (MOTRIN) 400 MG tablet Take 400 mg by mouth every 6 hours as needed for Pain. 4 times a week      bismuth subsalicylate (PEPTO-BISMOL) 262 MG chewable tablet Chew 524 mg by mouth 4 times daily (before meals and nightly). (Patient not taking: Reported on 9/13/2024)      MELATONIN PO  (Patient not taking: Reported on 9/13/2024)      dicyclomine (BENTYL) 20 MG tablet Take 1 tablet by mouth 4 times daily (before meals and nightly). (Patient not taking: Reported on 9/13/2024) 120 tablet 2    Loratadine 10 MG Cap Take 10 mg by mouth as needed. (Patient not taking: Reported on 9/13/2024)       No current facility-administered medications for this visit.        OBJECTIVE  Vitals:    09/13/24 1346   BP: 106/58   Pulse: 68   Resp: 16   Temp: 97.5 °F (36.4 °C)   TempSrc: Temporal   SpO2: 100%      Gen: Alert, in no apparent distress  Voice: clear    EAR: nomal canals and  Pain Management Attending Addendum    SUBJECTIVE:    Therapy:	  [X ] IV PCA	   [ ] Epidural           [ ] s/p Spinal Opoid              [ ] Postpartum infusion	  [ ] Patient controlled regional anesthesia (PCRA)    [ ] prn Analgesics    OBJECTIVE:   [X ] No new signs     [ ] Other:    Side Effects:  [X ] None			[ ] Other:    Assessment of Catheter Site:		[ ] Intact		[ ] Other:n/a    ASSESSMENT/PLAN  [X ] Continue current therapy    [ ] Therapy changed to:    [ ] IV PCA       [ ] Epidural     [ ] prn Analgesics     [ ] post partum infusion    Comments: TM's  EYES:MOHIT, no injection  bilateral  NOSE: nasal mucosa pink, moist and non-swollen  THROAT: mild erythema of pharynx  NECK: supple without cervical adenopathy. No meningismus  HEART: regular rate and rhythm without murmur, rub or gallop  LUNG: clear to auscultation without ronchi, wheezing or crackles  ABDOMEN: soft, non-tender, non-distended. Bowel sounds present and active without rigidity. No organomegaly  SKIN:color, texture, turgor are normal  Vascular: intact    STREP test NEGATIVE. Results were independently reviewed and interpreted and discussed during patient evaluation       ASSESSMENT  Pharyngitis    PLAN  Underwent discussion with Torres regarding this clinical situation. symptomatic measures were given and meds discussed  F/U: Torres will follow up with his primary care physician as needed or as directed but may return to the [facility] if needed. If symptoms become severe may go to the ER.    Royce Nguyễn, DO

## 2024-09-14 LAB — BACTERIA UR CULT: NORMAL

## 2025-03-11 ENCOUNTER — APPOINTMENT (OUTPATIENT)
Dept: UROLOGY | Facility: CLINIC | Age: 75
End: 2025-03-11

## 2025-04-28 NOTE — ASU DISCHARGE PLAN (ADULT/PEDIATRIC). - FOLLOWUP APPOINTMENT CLINIC/PHYSICIAN
MEDICATIONS PENDED ARE CORRECT:  Yes    Refill Requested:      Medication: dosepin  Last office visit date: 03/19/2025  Medication Refill Protocol Failed.  Protocol approved due to: identified sig mis match, same prescription.      Labs Pended: yes, no: No    Medications have been reconciled. Yes    Upcoming visit: 9/24/2025 with: Meño Mazariegos DO     Follow-up needed per last office visit: No follow-up needed at this time, patient scheduled    Request forwarded to Meño Mazariegos DO  Please advise on refills.     Dr. Jacobsen

## 2025-06-03 ENCOUNTER — APPOINTMENT (OUTPATIENT)
Dept: UROLOGY | Facility: CLINIC | Age: 75
End: 2025-06-03
Payer: MEDICARE

## 2025-06-03 VITALS
HEIGHT: 64 IN | HEART RATE: 114 BPM | SYSTOLIC BLOOD PRESSURE: 172 MMHG | BODY MASS INDEX: 27.49 KG/M2 | DIASTOLIC BLOOD PRESSURE: 96 MMHG | RESPIRATION RATE: 16 BRPM | OXYGEN SATURATION: 96 % | WEIGHT: 161 LBS

## 2025-06-03 DIAGNOSIS — R33.9 RETENTION OF URINE, UNSPECIFIED: ICD-10-CM

## 2025-06-03 DIAGNOSIS — B95.2 URINARY TRACT INFECTION, SITE NOT SPECIFIED: ICD-10-CM

## 2025-06-03 DIAGNOSIS — N39.0 URINARY TRACT INFECTION, SITE NOT SPECIFIED: ICD-10-CM

## 2025-06-03 DIAGNOSIS — N39.41 URGE INCONTINENCE: ICD-10-CM

## 2025-06-03 DIAGNOSIS — N13.30 UNSPECIFIED HYDRONEPHROSIS: ICD-10-CM

## 2025-06-03 PROCEDURE — 99214 OFFICE O/P EST MOD 30 MIN: CPT

## 2025-06-03 PROCEDURE — G2211 COMPLEX E/M VISIT ADD ON: CPT

## 2025-06-03 RX ORDER — AMOXICILLIN AND CLAVULANATE POTASSIUM 500; 125 MG/1; MG/1
500-125 TABLET, FILM COATED ORAL
Qty: 10 | Refills: 0 | Status: ACTIVE | COMMUNITY
Start: 2025-06-03 | End: 1900-01-01

## 2025-06-04 LAB
ANION GAP SERPL CALC-SCNC: 16 MMOL/L
APPEARANCE: CLEAR
BACTERIA: NEGATIVE /HPF
BILIRUBIN URINE: NEGATIVE
BLOOD URINE: ABNORMAL
BUN SERPL-MCNC: 26 MG/DL
CALCIUM SERPL-MCNC: 9.1 MG/DL
CAST: NORMAL /LPF
CHLORIDE SERPL-SCNC: 104 MMOL/L
CO2 SERPL-SCNC: 17 MMOL/L
COLOR: YELLOW
CREAT SERPL-MCNC: 3.11 MG/DL
EGFRCR SERPLBLD CKD-EPI 2021: 20 ML/MIN/1.73M2
EPITHELIAL CELLS: 0 /HPF
GLUCOSE QUALITATIVE U: 100 MG/DL
GLUCOSE SERPL-MCNC: 165 MG/DL
KETONES URINE: NEGATIVE MG/DL
LEUKOCYTE ESTERASE URINE: ABNORMAL
MICROSCOPIC-UA: NORMAL
NITRITE URINE: POSITIVE
PH URINE: 7
POTASSIUM SERPL-SCNC: 5.1 MMOL/L
PROTEIN URINE: 300 MG/DL
PSA SERPL-MCNC: 0.6 NG/ML
RED BLOOD CELLS URINE: 1 /HPF
REVIEW: NORMAL
SODIUM SERPL-SCNC: 137 MMOL/L
SPECIFIC GRAVITY URINE: 1.01
UROBILINOGEN URINE: 0.2 MG/DL
WBC CLUMPS: PRESENT
WHITE BLOOD CELLS URINE: 206 /HPF

## 2025-06-09 LAB — BACTERIA UR CULT: ABNORMAL

## 2025-06-11 ENCOUNTER — APPOINTMENT (OUTPATIENT)
Dept: CT IMAGING | Facility: CLINIC | Age: 75
End: 2025-06-11
Payer: MEDICARE

## 2025-06-11 ENCOUNTER — OUTPATIENT (OUTPATIENT)
Dept: OUTPATIENT SERVICES | Facility: HOSPITAL | Age: 75
LOS: 1 days | End: 2025-06-11
Payer: COMMERCIAL

## 2025-06-11 DIAGNOSIS — Z95.1 PRESENCE OF AORTOCORONARY BYPASS GRAFT: Chronic | ICD-10-CM

## 2025-06-11 DIAGNOSIS — Z90.79 ACQUIRED ABSENCE OF OTHER GENITAL ORGAN(S): Chronic | ICD-10-CM

## 2025-06-11 DIAGNOSIS — N13.30 UNSPECIFIED HYDRONEPHROSIS: ICD-10-CM

## 2025-06-11 DIAGNOSIS — Z98.890 OTHER SPECIFIED POSTPROCEDURAL STATES: Chronic | ICD-10-CM

## 2025-06-11 PROCEDURE — 74176 CT ABD & PELVIS W/O CONTRAST: CPT | Mod: 26

## 2025-06-11 PROCEDURE — 74176 CT ABD & PELVIS W/O CONTRAST: CPT

## (undated) DEVICE — GOWN TRIMAX LG

## (undated) DEVICE — GLV 8.5 PROTEXIS (WHITE)

## (undated) DEVICE — POSITIONER FOAM EGG CRATE ULNAR 2PCS (PINK)

## (undated) DEVICE — FOLEY HOLDER STATLOCK 2 WAY ADULT

## (undated) DEVICE — POSITIONER FOAM HEADREST (PINK)

## (undated) DEVICE — PACK CYSTO

## (undated) DEVICE — VENODYNE/SCD SLEEVE CALF LARGE

## (undated) DEVICE — SOL IRR POUR H2O 1500ML

## (undated) DEVICE — SPECIMEN CONTAINER 100ML

## (undated) DEVICE — ACMI SELF-SEALING SEAL UP TO 7FR

## (undated) DEVICE — SYR ASEPTO

## (undated) DEVICE — GLV 8 PROTEXIS (WHITE)

## (undated) DEVICE — GLV 7.5 PROTEXIS (WHITE)

## (undated) DEVICE — GLV 6.5 PROTEXIS (WHITE)

## (undated) DEVICE — TUBING RANGER FLUID IRRIGATION SET DISP

## (undated) DEVICE — FOLEY TRAY 16FR 5CC LTX UMETER CLOSED

## (undated) DEVICE — SOL IRR BAG NS 0.9% 3000ML

## (undated) DEVICE — IRR BULB PATHFINDER + 10"

## (undated) DEVICE — DRAPE EQUIPMENT BANDED BAG 30 X 30" (SHOWER CAP)

## (undated) DEVICE — WARMING BLANKET UPPER ADULT

## (undated) DEVICE — GLV 7 PROTEXIS (WHITE)

## (undated) DEVICE — SOL IRR POUR NS 0.9% 500ML